# Patient Record
Sex: MALE | Race: WHITE | Employment: OTHER | URBAN - METROPOLITAN AREA
[De-identification: names, ages, dates, MRNs, and addresses within clinical notes are randomized per-mention and may not be internally consistent; named-entity substitution may affect disease eponyms.]

---

## 2017-04-04 ENCOUNTER — OFFICE VISIT (OUTPATIENT)
Dept: FAMILY MEDICINE CLINIC | Age: 67
End: 2017-04-04

## 2017-04-04 VITALS
SYSTOLIC BLOOD PRESSURE: 120 MMHG | TEMPERATURE: 97.7 F | DIASTOLIC BLOOD PRESSURE: 80 MMHG | WEIGHT: 255 LBS | HEIGHT: 74 IN | HEART RATE: 63 BPM | OXYGEN SATURATION: 98 % | BODY MASS INDEX: 32.73 KG/M2 | RESPIRATION RATE: 16 BRPM

## 2017-04-04 DIAGNOSIS — E11.40 TYPE 2 DIABETES, CONTROLLED, WITH NEUROPATHY (HCC): ICD-10-CM

## 2017-04-04 DIAGNOSIS — I10 ESSENTIAL HYPERTENSION: ICD-10-CM

## 2017-04-04 DIAGNOSIS — D69.6 THROMBOCYTOPENIA (HCC): ICD-10-CM

## 2017-04-04 DIAGNOSIS — E78.2 MIXED HYPERLIPIDEMIA: ICD-10-CM

## 2017-04-04 DIAGNOSIS — R73.9 HYPERGLYCEMIA: Primary | ICD-10-CM

## 2017-04-04 LAB — HBA1C MFR BLD: 8 %

## 2017-04-04 PROCEDURE — 1036F TOBACCO NON-USER: CPT | Performed by: FAMILY MEDICINE

## 2017-04-04 PROCEDURE — 3017F COLORECTAL CA SCREEN DOC REV: CPT | Performed by: FAMILY MEDICINE

## 2017-04-04 PROCEDURE — 4040F PNEUMOC VAC/ADMIN/RCVD: CPT | Performed by: FAMILY MEDICINE

## 2017-04-04 PROCEDURE — 1123F ACP DISCUSS/DSCN MKR DOCD: CPT | Performed by: FAMILY MEDICINE

## 2017-04-04 PROCEDURE — G8417 CALC BMI ABV UP PARAM F/U: HCPCS | Performed by: FAMILY MEDICINE

## 2017-04-04 PROCEDURE — 83036 HEMOGLOBIN GLYCOSYLATED A1C: CPT | Performed by: FAMILY MEDICINE

## 2017-04-04 PROCEDURE — 3045F PR MOST RECENT HEMOGLOBIN A1C LEVEL 7.0-9.0%: CPT | Performed by: FAMILY MEDICINE

## 2017-04-04 PROCEDURE — G8427 DOCREV CUR MEDS BY ELIG CLIN: HCPCS | Performed by: FAMILY MEDICINE

## 2017-04-04 PROCEDURE — 99214 OFFICE O/P EST MOD 30 MIN: CPT | Performed by: FAMILY MEDICINE

## 2017-04-04 RX ORDER — GLIPIZIDE 5 MG/1
5 TABLET ORAL 3 TIMES DAILY
Qty: 270 TABLET | Refills: 1 | Status: SHIPPED | OUTPATIENT
Start: 2017-04-04 | End: 2017-09-06 | Stop reason: SDUPTHER

## 2017-04-04 RX ORDER — LOSARTAN POTASSIUM AND HYDROCHLOROTHIAZIDE 25; 100 MG/1; MG/1
1 TABLET ORAL DAILY
Qty: 90 TABLET | Refills: 1 | Status: SHIPPED | OUTPATIENT
Start: 2017-04-04 | End: 2017-09-06 | Stop reason: SDUPTHER

## 2017-04-04 RX ORDER — METOPROLOL TARTRATE 100 MG/1
100 TABLET ORAL 2 TIMES DAILY
Qty: 180 TABLET | Refills: 1 | Status: SHIPPED | OUTPATIENT
Start: 2017-04-04 | End: 2017-09-06 | Stop reason: SDUPTHER

## 2017-04-04 ASSESSMENT — ENCOUNTER SYMPTOMS
RESPIRATORY NEGATIVE: 1
EYES NEGATIVE: 1
GASTROINTESTINAL NEGATIVE: 1

## 2017-04-05 LAB
CREATININE URINE: 24.3 MG/DL (ref 39–259)
MICROALBUMIN UR-MCNC: <1.2 MG/DL
MICROALBUMIN/CREAT UR-RTO: ABNORMAL MG/G (ref 0–30)

## 2017-04-12 ENCOUNTER — TELEPHONE (OUTPATIENT)
Dept: FAMILY MEDICINE CLINIC | Age: 67
End: 2017-04-12

## 2017-09-06 ENCOUNTER — OFFICE VISIT (OUTPATIENT)
Dept: FAMILY MEDICINE CLINIC | Age: 67
End: 2017-09-06

## 2017-09-06 VITALS
HEART RATE: 71 BPM | BODY MASS INDEX: 33.98 KG/M2 | TEMPERATURE: 98.1 F | OXYGEN SATURATION: 99 % | RESPIRATION RATE: 16 BRPM | SYSTOLIC BLOOD PRESSURE: 122 MMHG | DIASTOLIC BLOOD PRESSURE: 78 MMHG | HEIGHT: 74 IN | WEIGHT: 264.8 LBS

## 2017-09-06 DIAGNOSIS — E11.40 TYPE 2 DIABETES, CONTROLLED, WITH NEUROPATHY (HCC): Primary | ICD-10-CM

## 2017-09-06 DIAGNOSIS — I10 ESSENTIAL HYPERTENSION: ICD-10-CM

## 2017-09-06 DIAGNOSIS — N52.9 ERECTILE DYSFUNCTION, UNSPECIFIED ERECTILE DYSFUNCTION TYPE: ICD-10-CM

## 2017-09-06 DIAGNOSIS — M62.838 MUSCLE SPASM: ICD-10-CM

## 2017-09-06 LAB — HBA1C MFR BLD: 6.6 %

## 2017-09-06 PROCEDURE — G8427 DOCREV CUR MEDS BY ELIG CLIN: HCPCS | Performed by: FAMILY MEDICINE

## 2017-09-06 PROCEDURE — 99214 OFFICE O/P EST MOD 30 MIN: CPT | Performed by: FAMILY MEDICINE

## 2017-09-06 PROCEDURE — G8417 CALC BMI ABV UP PARAM F/U: HCPCS | Performed by: FAMILY MEDICINE

## 2017-09-06 PROCEDURE — 3017F COLORECTAL CA SCREEN DOC REV: CPT | Performed by: FAMILY MEDICINE

## 2017-09-06 PROCEDURE — 1036F TOBACCO NON-USER: CPT | Performed by: FAMILY MEDICINE

## 2017-09-06 PROCEDURE — 4040F PNEUMOC VAC/ADMIN/RCVD: CPT | Performed by: FAMILY MEDICINE

## 2017-09-06 PROCEDURE — 3046F HEMOGLOBIN A1C LEVEL >9.0%: CPT | Performed by: FAMILY MEDICINE

## 2017-09-06 PROCEDURE — 1123F ACP DISCUSS/DSCN MKR DOCD: CPT | Performed by: FAMILY MEDICINE

## 2017-09-06 PROCEDURE — 83036 HEMOGLOBIN GLYCOSYLATED A1C: CPT | Performed by: FAMILY MEDICINE

## 2017-09-06 RX ORDER — METOPROLOL TARTRATE 100 MG/1
100 TABLET ORAL 2 TIMES DAILY
Qty: 180 TABLET | Refills: 1 | Status: ON HOLD | OUTPATIENT
Start: 2017-09-06 | End: 2018-12-06 | Stop reason: HOSPADM

## 2017-09-06 RX ORDER — GLIPIZIDE 5 MG/1
5 TABLET ORAL 3 TIMES DAILY
Qty: 270 TABLET | Refills: 1 | Status: SHIPPED | OUTPATIENT
Start: 2017-09-06 | End: 2018-12-13

## 2017-09-06 RX ORDER — TIZANIDINE 4 MG/1
4 TABLET ORAL EVERY 6 HOURS PRN
Qty: 30 TABLET | Refills: 3 | Status: SHIPPED | OUTPATIENT
Start: 2017-09-06 | End: 2018-12-13 | Stop reason: ALTCHOICE

## 2017-09-06 RX ORDER — LOSARTAN POTASSIUM AND HYDROCHLOROTHIAZIDE 25; 100 MG/1; MG/1
1 TABLET ORAL DAILY
Qty: 90 TABLET | Refills: 1 | Status: SHIPPED | OUTPATIENT
Start: 2017-09-06 | End: 2022-02-18 | Stop reason: ALTCHOICE

## 2017-09-06 RX ORDER — SILDENAFIL CITRATE 20 MG/1
20 TABLET ORAL PRN
Qty: 30 TABLET | Refills: 3 | Status: ON HOLD | OUTPATIENT
Start: 2017-09-06 | End: 2018-12-06 | Stop reason: HOSPADM

## 2017-09-06 RX ORDER — CYCLOBENZAPRINE HCL 10 MG
10 TABLET ORAL 3 TIMES DAILY PRN
COMMUNITY
End: 2018-11-28

## 2018-11-23 ENCOUNTER — HOSPITAL ENCOUNTER (OUTPATIENT)
Age: 68
Discharge: HOME | End: 2018-11-23
Attending: INTERNAL MEDICINE | Admitting: INTERNAL MEDICINE
Payer: MEDICARE

## 2018-11-23 VITALS
SYSTOLIC BLOOD PRESSURE: 152 MMHG | TEMPERATURE: 97.7 F | RESPIRATION RATE: 18 BRPM | OXYGEN SATURATION: 100 % | HEART RATE: 82 BPM | DIASTOLIC BLOOD PRESSURE: 75 MMHG

## 2018-11-23 PROBLEM — D50.9 ANEMIA, IRON DEFICIENCY: Status: ACTIVE | Noted: 2018-11-23

## 2018-11-23 PROBLEM — D50.9 IRON DEFICIENCY ANEMIA: Status: ACTIVE | Noted: 2018-11-23

## 2018-11-23 LAB
ABO/RH: NORMAL
ANTIBODY SCREEN: NORMAL
BLOOD BANK DISPENSE STATUS: NORMAL
BLOOD BANK DISPENSE STATUS: NORMAL
BLOOD BANK PRODUCT CODE: NORMAL
BLOOD BANK PRODUCT CODE: NORMAL
BPU ID: NORMAL
BPU ID: NORMAL
DESCRIPTION BLOOD BANK: NORMAL
DESCRIPTION BLOOD BANK: NORMAL

## 2018-11-23 PROCEDURE — 86850 RBC ANTIBODY SCREEN: CPT

## 2018-11-23 PROCEDURE — 86923 COMPATIBILITY TEST ELECTRIC: CPT

## 2018-11-23 PROCEDURE — 94664 DEMO&/EVAL PT USE INHALER: CPT

## 2018-11-23 PROCEDURE — 2580000003 HC RX 258

## 2018-11-23 PROCEDURE — 86901 BLOOD TYPING SEROLOGIC RH(D): CPT

## 2018-11-23 PROCEDURE — 93005 ELECTROCARDIOGRAM TRACING: CPT | Performed by: INTERNAL MEDICINE

## 2018-11-23 PROCEDURE — 86900 BLOOD TYPING SEROLOGIC ABO: CPT

## 2018-11-23 PROCEDURE — 36430 TRANSFUSION BLD/BLD COMPNT: CPT

## 2018-11-23 PROCEDURE — 36415 COLL VENOUS BLD VENIPUNCTURE: CPT

## 2018-11-23 PROCEDURE — P9016 RBC LEUKOCYTES REDUCED: HCPCS

## 2018-11-23 RX ORDER — SODIUM CHLORIDE 0.9 % (FLUSH) 0.9 %
10 SYRINGE (ML) INJECTION PRN
Status: DISCONTINUED | OUTPATIENT
Start: 2018-11-23 | End: 2018-11-23 | Stop reason: HOSPADM

## 2018-11-23 RX ORDER — SODIUM CHLORIDE 9 MG/ML
INJECTION, SOLUTION INTRAVENOUS
Status: COMPLETED
Start: 2018-11-23 | End: 2018-11-23

## 2018-11-23 RX ORDER — SODIUM CHLORIDE 9 MG/ML
INJECTION, SOLUTION INTRAVENOUS
Status: DISCONTINUED
Start: 2018-11-23 | End: 2018-11-23 | Stop reason: HOSPADM

## 2018-11-23 RX ORDER — ACETAMINOPHEN 325 MG/1
650 TABLET ORAL EVERY 4 HOURS PRN
Status: DISCONTINUED | OUTPATIENT
Start: 2018-11-23 | End: 2018-11-23 | Stop reason: HOSPADM

## 2018-11-23 RX ORDER — ONDANSETRON 2 MG/ML
4 INJECTION INTRAMUSCULAR; INTRAVENOUS EVERY 6 HOURS PRN
Status: DISCONTINUED | OUTPATIENT
Start: 2018-11-23 | End: 2018-11-23 | Stop reason: HOSPADM

## 2018-11-23 RX ADMIN — SODIUM CHLORIDE 250 ML: 9 INJECTION, SOLUTION INTRAVENOUS at 14:43

## 2018-11-24 PROCEDURE — 93010 ELECTROCARDIOGRAM REPORT: CPT | Performed by: INTERNAL MEDICINE

## 2018-11-26 ENCOUNTER — OFFICE VISIT (OUTPATIENT)
Dept: CARDIOLOGY CLINIC | Age: 68
End: 2018-11-26
Payer: MEDICARE

## 2018-11-26 VITALS
DIASTOLIC BLOOD PRESSURE: 62 MMHG | HEART RATE: 83 BPM | OXYGEN SATURATION: 99 % | BODY MASS INDEX: 33.75 KG/M2 | HEIGHT: 74 IN | SYSTOLIC BLOOD PRESSURE: 138 MMHG | WEIGHT: 263 LBS

## 2018-11-26 DIAGNOSIS — R07.9 EXERTIONAL CHEST PAIN: Primary | ICD-10-CM

## 2018-11-26 DIAGNOSIS — I10 ESSENTIAL HYPERTENSION: ICD-10-CM

## 2018-11-26 LAB
BASOPHILS ABSOLUTE: 0 K/UL (ref 0–0.2)
BASOPHILS RELATIVE PERCENT: 0.4 %
EKG ATRIAL RATE: 75 BPM
EKG DIAGNOSIS: NORMAL
EKG P AXIS: 57 DEGREES
EKG P-R INTERVAL: 138 MS
EKG Q-T INTERVAL: 408 MS
EKG QRS DURATION: 92 MS
EKG QTC CALCULATION (BAZETT): 455 MS
EKG R AXIS: 33 DEGREES
EKG T AXIS: -12 DEGREES
EKG VENTRICULAR RATE: 75 BPM
EOSINOPHILS ABSOLUTE: 0.3 K/UL (ref 0–0.6)
EOSINOPHILS RELATIVE PERCENT: 5.7 %
HCT VFR BLD CALC: 27.6 % (ref 36–48)
HEMOGLOBIN: 9.1 G/DL (ref 12–16)
LYMPHOCYTES ABSOLUTE: 0.8 K/UL (ref 1–5.1)
LYMPHOCYTES RELATIVE PERCENT: 17.8 %
MCH RBC QN AUTO: 27 PG (ref 26–34)
MCHC RBC AUTO-ENTMCNC: 32.8 G/DL (ref 31–36)
MCV RBC AUTO: 82 FL (ref 80–100)
MONOCYTES ABSOLUTE: 0.4 K/UL (ref 0–1.3)
MONOCYTES RELATIVE PERCENT: 8.5 %
NEUTROPHILS ABSOLUTE: 3.2 K/UL (ref 1.7–7.7)
NEUTROPHILS RELATIVE PERCENT: 67.6 %
PDW BLD-RTO: 16.2 % (ref 12.4–15.4)
PLATELET # BLD: 124 K/UL (ref 135–450)
PMV BLD AUTO: 8.8 FL (ref 5–10.5)
RBC # BLD: 3.36 M/UL (ref 4–5.2)
WBC # BLD: 4.7 K/UL (ref 4–11)

## 2018-11-26 PROCEDURE — 1123F ACP DISCUSS/DSCN MKR DOCD: CPT | Performed by: INTERNAL MEDICINE

## 2018-11-26 PROCEDURE — G8417 CALC BMI ABV UP PARAM F/U: HCPCS | Performed by: INTERNAL MEDICINE

## 2018-11-26 PROCEDURE — 1036F TOBACCO NON-USER: CPT | Performed by: INTERNAL MEDICINE

## 2018-11-26 PROCEDURE — 4040F PNEUMOC VAC/ADMIN/RCVD: CPT | Performed by: INTERNAL MEDICINE

## 2018-11-26 PROCEDURE — G8484 FLU IMMUNIZE NO ADMIN: HCPCS | Performed by: INTERNAL MEDICINE

## 2018-11-26 PROCEDURE — G8427 DOCREV CUR MEDS BY ELIG CLIN: HCPCS | Performed by: INTERNAL MEDICINE

## 2018-11-26 PROCEDURE — 99205 OFFICE O/P NEW HI 60 MIN: CPT | Performed by: INTERNAL MEDICINE

## 2018-11-26 PROCEDURE — 1101F PT FALLS ASSESS-DOCD LE1/YR: CPT | Performed by: INTERNAL MEDICINE

## 2018-11-26 PROCEDURE — 3017F COLORECTAL CA SCREEN DOC REV: CPT | Performed by: INTERNAL MEDICINE

## 2018-11-26 NOTE — PATIENT INSTRUCTIONS
adult-strength or 2 to 4 low-dose aspirin. Wait for an ambulance. Do not try to drive yourself.    Call your doctor today if:    · You have any trouble breathing.     · Your chest pain gets worse.     · You are dizzy or lightheaded, or you feel like you may faint.     · You are not getting better as expected.     · You are having new or different chest pain. Where can you learn more? Go to [email or web address] and sign in to your Wave[employer] account. Enter A120 in the Honestly.com box to learn more about \"Chest Pain: Care Instructions. \"     If you do not have an account, please click on the \"Sign Up Now\" link. Current as of: November 20, 2017  Content Version: 11.8  © 5961-2138 Healthwise, Incorporated. Care instructions adapted under license by [facility] ([facility]). If you have questions about a medical condition or this instruction, always ask your healthcare professional. [place] [address] any warranty or liability for your use of this information.

## 2018-11-30 ENCOUNTER — APPOINTMENT (OUTPATIENT)
Dept: GENERAL RADIOLOGY | Age: 68
DRG: 247 | End: 2018-11-30
Payer: MEDICARE

## 2018-11-30 ENCOUNTER — HOSPITAL ENCOUNTER (OUTPATIENT)
Dept: NON INVASIVE DIAGNOSTICS | Age: 68
Discharge: HOME | DRG: 247 | End: 2018-11-30
Payer: MEDICARE

## 2018-11-30 ENCOUNTER — HOSPITAL ENCOUNTER (INPATIENT)
Age: 68
LOS: 5 days | Discharge: HOME | DRG: 247 | End: 2018-12-06
Attending: EMERGENCY MEDICINE | Admitting: INTERNAL MEDICINE
Payer: MEDICARE

## 2018-11-30 DIAGNOSIS — R07.9 EXERTIONAL CHEST PAIN: ICD-10-CM

## 2018-11-30 DIAGNOSIS — I21.4 NON-STEMI (NON-ST ELEVATED MYOCARDIAL INFARCTION) (HCC): Primary | ICD-10-CM

## 2018-11-30 DIAGNOSIS — D64.9 ANEMIA, UNSPECIFIED TYPE: ICD-10-CM

## 2018-11-30 DIAGNOSIS — R07.9 CHEST PAIN, UNSPECIFIED TYPE: ICD-10-CM

## 2018-11-30 DIAGNOSIS — E11.40 TYPE 2 DIABETES, CONTROLLED, WITH NEUROPATHY (HCC): ICD-10-CM

## 2018-11-30 DIAGNOSIS — D69.3 CHRONIC ITP (IDIOPATHIC THROMBOCYTOPENIA) (HCC): ICD-10-CM

## 2018-11-30 LAB
A/G RATIO: 1.2 (ref 1.1–2.2)
ALBUMIN SERPL-MCNC: 4 G/DL (ref 3.4–5)
ALP BLD-CCNC: 94 U/L (ref 40–129)
ALT SERPL-CCNC: 29 U/L (ref 10–40)
ANION GAP SERPL CALCULATED.3IONS-SCNC: 9 MMOL/L (ref 3–16)
AST SERPL-CCNC: 28 U/L (ref 15–37)
BASOPHILS ABSOLUTE: 0 K/UL (ref 0–0.2)
BASOPHILS RELATIVE PERCENT: 0.5 %
BILIRUB SERPL-MCNC: 0.6 MG/DL (ref 0–1)
BUN BLDV-MCNC: 11 MG/DL (ref 7–20)
CALCIUM SERPL-MCNC: 9.5 MG/DL (ref 8.3–10.6)
CHLORIDE BLD-SCNC: 103 MMOL/L (ref 99–110)
CO2: 27 MMOL/L (ref 21–32)
CREAT SERPL-MCNC: 0.9 MG/DL (ref 0.8–1.3)
EOSINOPHILS ABSOLUTE: 0.2 K/UL (ref 0–0.6)
EOSINOPHILS RELATIVE PERCENT: 4.2 %
GFR AFRICAN AMERICAN: >60
GFR NON-AFRICAN AMERICAN: >60
GLOBULIN: 3.4 G/DL
GLUCOSE BLD-MCNC: 126 MG/DL (ref 70–99)
GLUCOSE BLD-MCNC: 202 MG/DL (ref 70–99)
GLUCOSE BLD-MCNC: 205 MG/DL (ref 70–99)
HCT VFR BLD CALC: 27.4 % (ref 40.5–52.5)
HEMOGLOBIN: 8.9 G/DL (ref 13.5–17.5)
INR BLD: 1.14 (ref 0.86–1.14)
LV EF: 50 %
LVEF MODALITY: NORMAL
LYMPHOCYTES ABSOLUTE: 0.7 K/UL (ref 1–5.1)
LYMPHOCYTES RELATIVE PERCENT: 16.7 %
MAGNESIUM: 1.9 MG/DL (ref 1.8–2.4)
MCH RBC QN AUTO: 26.3 PG (ref 26–34)
MCHC RBC AUTO-ENTMCNC: 32.5 G/DL (ref 31–36)
MCV RBC AUTO: 80.8 FL (ref 80–100)
MONOCYTES ABSOLUTE: 0.3 K/UL (ref 0–1.3)
MONOCYTES RELATIVE PERCENT: 8.3 %
NEUTROPHILS ABSOLUTE: 2.9 K/UL (ref 1.7–7.7)
NEUTROPHILS RELATIVE PERCENT: 70.3 %
PDW BLD-RTO: 16.2 % (ref 12.4–15.4)
PERFORMED ON: ABNORMAL
PERFORMED ON: ABNORMAL
PLATELET # BLD: 108 K/UL (ref 135–450)
PMV BLD AUTO: 7.9 FL (ref 5–10.5)
POTASSIUM REFLEX MAGNESIUM: 3.4 MMOL/L (ref 3.5–5.1)
PRO-BNP: 335 PG/ML (ref 0–124)
PROTHROMBIN TIME: 13 SEC (ref 9.8–13)
RBC # BLD: 3.39 M/UL (ref 4.2–5.9)
SODIUM BLD-SCNC: 139 MMOL/L (ref 136–145)
TOTAL PROTEIN: 7.4 G/DL (ref 6.4–8.2)
TROPONIN: 0.02 NG/ML
TROPONIN: 0.03 NG/ML
WBC # BLD: 4.1 K/UL (ref 4–11)

## 2018-11-30 PROCEDURE — 85025 COMPLETE CBC W/AUTO DIFF WBC: CPT

## 2018-11-30 PROCEDURE — 83880 ASSAY OF NATRIURETIC PEPTIDE: CPT

## 2018-11-30 PROCEDURE — 2580000003 HC RX 258: Performed by: INTERNAL MEDICINE

## 2018-11-30 PROCEDURE — 83735 ASSAY OF MAGNESIUM: CPT

## 2018-11-30 PROCEDURE — C9113 INJ PANTOPRAZOLE SODIUM, VIA: HCPCS | Performed by: INTERNAL MEDICINE

## 2018-11-30 PROCEDURE — G0378 HOSPITAL OBSERVATION PER HR: HCPCS

## 2018-11-30 PROCEDURE — 96374 THER/PROPH/DIAG INJ IV PUSH: CPT

## 2018-11-30 PROCEDURE — 80053 COMPREHEN METABOLIC PANEL: CPT

## 2018-11-30 PROCEDURE — 6370000000 HC RX 637 (ALT 250 FOR IP): Performed by: INTERNAL MEDICINE

## 2018-11-30 PROCEDURE — 6360000002 HC RX W HCPCS: Performed by: INTERNAL MEDICINE

## 2018-11-30 PROCEDURE — 36415 COLL VENOUS BLD VENIPUNCTURE: CPT

## 2018-11-30 PROCEDURE — A9502 TC99M TETROFOSMIN: HCPCS | Performed by: INTERNAL MEDICINE

## 2018-11-30 PROCEDURE — 85610 PROTHROMBIN TIME: CPT

## 2018-11-30 PROCEDURE — 6370000000 HC RX 637 (ALT 250 FOR IP): Performed by: EMERGENCY MEDICINE

## 2018-11-30 PROCEDURE — 78452 HT MUSCLE IMAGE SPECT MULT: CPT

## 2018-11-30 PROCEDURE — 84484 ASSAY OF TROPONIN QUANT: CPT

## 2018-11-30 PROCEDURE — 3430000000 HC RX DIAGNOSTIC RADIOPHARMACEUTICAL: Performed by: INTERNAL MEDICINE

## 2018-11-30 PROCEDURE — 71046 X-RAY EXAM CHEST 2 VIEWS: CPT

## 2018-11-30 PROCEDURE — 99285 EMERGENCY DEPT VISIT HI MDM: CPT

## 2018-11-30 PROCEDURE — 93005 ELECTROCARDIOGRAM TRACING: CPT | Performed by: PHYSICIAN ASSISTANT

## 2018-11-30 PROCEDURE — 93017 CV STRESS TEST TRACING ONLY: CPT | Performed by: INTERNAL MEDICINE

## 2018-11-30 RX ORDER — HYDROCHLOROTHIAZIDE 25 MG/1
25 TABLET ORAL DAILY
Status: DISCONTINUED | OUTPATIENT
Start: 2018-12-01 | End: 2018-12-01

## 2018-11-30 RX ORDER — LOSARTAN POTASSIUM 100 MG/1
100 TABLET ORAL DAILY
Status: DISCONTINUED | OUTPATIENT
Start: 2018-12-01 | End: 2018-12-01

## 2018-11-30 RX ORDER — DEXTROSE MONOHYDRATE 50 MG/ML
100 INJECTION, SOLUTION INTRAVENOUS PRN
Status: DISCONTINUED | OUTPATIENT
Start: 2018-11-30 | End: 2018-12-03

## 2018-11-30 RX ORDER — ONDANSETRON 2 MG/ML
4 INJECTION INTRAMUSCULAR; INTRAVENOUS EVERY 6 HOURS PRN
Status: DISCONTINUED | OUTPATIENT
Start: 2018-11-30 | End: 2018-12-06 | Stop reason: HOSPADM

## 2018-11-30 RX ORDER — ASPIRIN 81 MG/1
324 TABLET, CHEWABLE ORAL ONCE
Status: COMPLETED | OUTPATIENT
Start: 2018-11-30 | End: 2018-11-30

## 2018-11-30 RX ORDER — MAGNESIUM SULFATE 1 G/100ML
1 INJECTION INTRAVENOUS PRN
Status: DISCONTINUED | OUTPATIENT
Start: 2018-11-30 | End: 2018-12-06 | Stop reason: HOSPADM

## 2018-11-30 RX ORDER — ASPIRIN 81 MG/1
81 TABLET, CHEWABLE ORAL DAILY
Status: DISCONTINUED | OUTPATIENT
Start: 2018-12-01 | End: 2018-12-06 | Stop reason: HOSPADM

## 2018-11-30 RX ORDER — DEXTROSE MONOHYDRATE 25 G/50ML
12.5 INJECTION, SOLUTION INTRAVENOUS PRN
Status: DISCONTINUED | OUTPATIENT
Start: 2018-11-30 | End: 2018-12-03

## 2018-11-30 RX ORDER — TIZANIDINE 4 MG/1
4 TABLET ORAL EVERY 8 HOURS PRN
Status: DISCONTINUED | OUTPATIENT
Start: 2018-11-30 | End: 2018-12-06 | Stop reason: HOSPADM

## 2018-11-30 RX ORDER — SODIUM CHLORIDE 0.9 % (FLUSH) 0.9 %
10 SYRINGE (ML) INJECTION EVERY 12 HOURS SCHEDULED
Status: DISCONTINUED | OUTPATIENT
Start: 2018-11-30 | End: 2018-12-04

## 2018-11-30 RX ORDER — NITROGLYCERIN 0.4 MG/1
0.4 TABLET SUBLINGUAL EVERY 5 MIN PRN
Status: DISCONTINUED | OUTPATIENT
Start: 2018-11-30 | End: 2018-12-06 | Stop reason: HOSPADM

## 2018-11-30 RX ORDER — SODIUM CHLORIDE 9 MG/ML
INJECTION, SOLUTION INTRAVENOUS CONTINUOUS
Status: DISPENSED | OUTPATIENT
Start: 2018-11-30 | End: 2018-12-01

## 2018-11-30 RX ORDER — POTASSIUM CHLORIDE 7.45 MG/ML
10 INJECTION INTRAVENOUS PRN
Status: DISCONTINUED | OUTPATIENT
Start: 2018-11-30 | End: 2018-12-06 | Stop reason: HOSPADM

## 2018-11-30 RX ORDER — METOPROLOL TARTRATE 50 MG/1
100 TABLET, FILM COATED ORAL 2 TIMES DAILY
Status: DISCONTINUED | OUTPATIENT
Start: 2018-11-30 | End: 2018-12-06 | Stop reason: HOSPADM

## 2018-11-30 RX ORDER — POTASSIUM CHLORIDE 20 MEQ/1
40 TABLET, EXTENDED RELEASE ORAL PRN
Status: DISCONTINUED | OUTPATIENT
Start: 2018-11-30 | End: 2018-12-06 | Stop reason: HOSPADM

## 2018-11-30 RX ORDER — FERROUS SULFATE 325(65) MG
325 TABLET ORAL 2 TIMES DAILY
COMMUNITY

## 2018-11-30 RX ORDER — ATORVASTATIN CALCIUM 40 MG/1
40 TABLET, FILM COATED ORAL NIGHTLY
Status: DISCONTINUED | OUTPATIENT
Start: 2018-11-30 | End: 2018-12-06 | Stop reason: HOSPADM

## 2018-11-30 RX ORDER — PANTOPRAZOLE SODIUM 40 MG/10ML
40 INJECTION, POWDER, LYOPHILIZED, FOR SOLUTION INTRAVENOUS 2 TIMES DAILY
Status: DISCONTINUED | OUTPATIENT
Start: 2018-11-30 | End: 2018-12-06 | Stop reason: HOSPADM

## 2018-11-30 RX ORDER — LOSARTAN POTASSIUM AND HYDROCHLOROTHIAZIDE 25; 100 MG/1; MG/1
1 TABLET ORAL DAILY
Status: DISCONTINUED | OUTPATIENT
Start: 2018-12-01 | End: 2018-11-30 | Stop reason: CLARIF

## 2018-11-30 RX ORDER — SODIUM CHLORIDE 9 MG/ML
INJECTION, SOLUTION INTRAVENOUS CONTINUOUS
Status: DISCONTINUED | OUTPATIENT
Start: 2018-11-30 | End: 2018-11-30

## 2018-11-30 RX ORDER — NICOTINE POLACRILEX 4 MG
15 LOZENGE BUCCAL PRN
Status: DISCONTINUED | OUTPATIENT
Start: 2018-11-30 | End: 2018-12-06 | Stop reason: HOSPADM

## 2018-11-30 RX ORDER — SODIUM CHLORIDE 0.9 % (FLUSH) 0.9 %
10 SYRINGE (ML) INJECTION PRN
Status: DISCONTINUED | OUTPATIENT
Start: 2018-11-30 | End: 2018-12-06 | Stop reason: HOSPADM

## 2018-11-30 RX ADMIN — SODIUM CHLORIDE: 9 INJECTION, SOLUTION INTRAVENOUS at 21:55

## 2018-11-30 RX ADMIN — Medication 10 ML: at 21:56

## 2018-11-30 RX ADMIN — TETROFOSMIN 30 MILLICURIE: 0.23 INJECTION, POWDER, LYOPHILIZED, FOR SOLUTION INTRAVENOUS at 14:40

## 2018-11-30 RX ADMIN — TETROFOSMIN 10 MILLICURIE: 0.23 INJECTION, POWDER, LYOPHILIZED, FOR SOLUTION INTRAVENOUS at 12:55

## 2018-11-30 RX ADMIN — METOPROLOL TARTRATE 100 MG: 50 TABLET, FILM COATED ORAL at 21:55

## 2018-11-30 RX ADMIN — ASPIRIN 81 MG 324 MG: 81 TABLET ORAL at 17:01

## 2018-11-30 RX ADMIN — PANTOPRAZOLE SODIUM 40 MG: 40 INJECTION, POWDER, FOR SOLUTION INTRAVENOUS at 21:55

## 2018-11-30 ASSESSMENT — PAIN SCALES - GENERAL: PAINLEVEL_OUTOF10: 0

## 2018-11-30 NOTE — H&P
HOSPITALISTS HISTORY AND PHYSICAL    11/30/2018 6:00 PM    Patient Information:  Rahul Dos Santos is a 77 y.o. male [id number]  PCP:  Yelena Quezada (Tel: [phone] )    Chief complaint:    Chief Complaint   Patient presents with    Other     Patient sent in from cardiology with complaints of an abnormal stress test and anemia         History of Present Illness:  Ariel Schuster is a 77 y.o. male who Was sent to the emergency department for a positive stress test that was done as an outpatient at our hospital.  The patient and the wife at the bedside both were describing the situation. Per the wife the patient's problem started several weeks ago as lightheadedness and shortness of breath with activity. She said that he was found to be anemic with hemoglobin that was low, and his primary care provider did send him to follow with the GI doctor. The patient symptoms could worsen over 7 days and his hemoglobin dropped from 9-7 and he was sent to receive blood transfusion when he felt that he has chest pain and shoulder pain have the GI doctor did consult cardiologist to clear him for a colposcopic procedure. Cardiologist didn't order a stress test during which was abnormal, I looked for the stress test in the Epic/chart. Following results, further ED department physician there was some ST depression. REVIEW OF SYSTEMS:   Constitutional: Negative for fever,chills or night sweats  ENT: Negative for rhinorrhea, epistaxis, hoarseness, sore throat. Respiratory: Negative for shortness of breath,wheezing  Cardiovascular: Negative for chest pain, palpitations   Gastrointestinal: Negative for nausea, vomiting, diarrhea  Genitourinary: Negative for polyuria, dysuria   Hematologic/Lymphatic: Negative for bleeding tendency, easy bruising  Musculoskeletal: Negative for myalgias and arthralgias  Neurologic: Negative for confusion,dysarthria.   Skin: Negative for itching,rash  Psychiatric: Negative for depression,anxiety, agitation. Endocrine: Negative for polydipsia,polyuria,heat /cold intolerance. Past Medical History:   has a past medical history of Cyst of joint of hand; Hypertension; and Type II or unspecified type diabetes mellitus without mention of complication, not stated as uncontrolled. Past Surgical History:   has a past surgical history that includes cyst removal; Inguinal hernia repair (Left, 12/11/15); and back surgery. Medications:  No current facility-administered medications on file prior to encounter. Current Outpatient Prescriptions on File Prior to Encounter   Medication Sig Dispense Refill    glipiZIDE (GLUCOTROL) 5 MG tablet Take 1 tablet by mouth 3 times daily (Patient taking differently: Take 10 mg by mouth 2 times daily (before meals) ) 270 tablet 1    losartan-hydrochlorothiazide (HYZAAR) 100-25 MG per tablet Take 1 tablet by mouth daily 90 tablet 1    metFORMIN (GLUCOPHAGE) 1000 MG tablet Take 1 tablet by mouth 2 times daily (with meals) 180 tablet 1    metoprolol (LOPRESSOR) 100 MG tablet Take 1 tablet by mouth 2 times daily 180 tablet 1    sildenafil (REVATIO) 20 MG tablet Take 1 tablet by mouth as needed (as needed) 30 tablet 3    tiZANidine (ZANAFLEX) 4 MG tablet Take 1 tablet by mouth every 6 hours as needed (as needed) 30 tablet 3    aspirin 81 MG tablet Take 81 mg by mouth daily      Multiple Vitamins-Minerals (MULTIVITAMIN PO) Take 1 tablet by mouth daily      CINNAMON PO Take 1,000 mg by mouth 2 times daily      glucose blood VI test strips (FREESTYLE LITE) strip Test blood sugar once daily. 100 each 3       Allergies: Allergies   Allergen Reactions    No Known Allergies         Social History:   reports that he has never smoked. He has never used smokeless tobacco. He reports that he drinks alcohol. He reports that he does not use drugs.      Family History:  family history includes Arthritis in his brother; Diabetes in his father; Heart Disease in his father; Kidney Disease in his mother. , Patient denies any smoking/alcohol/illicit drugs    Physical Exam:  BP (!) 132/54   Pulse 94   Temp 99.1 °F (37.3 °C) (Oral)   Resp 24   Ht 6' 2\" (1.88 m)   Wt 250 lb (113.4 kg)   SpO2 100%   BMI 32.10 kg/m²     General appearance:  Appears comfortable. Well nourished  Eyes: Sclera clear, pupils equal  ENT: Moist mucus membranes, no thrush. Trachea midline. Cardiovascular: Regular rhythm, normal S1, S2. No murmur, gallop, rub. No edema in lower extremities  Respiratory: Clear to auscultation bilaterally, no wheeze, good inspiratory effort  Gastrointestinal: Abdomen soft, non-tender, not distended, normal bowel sounds  Musculoskeletal: No cyanosis in digits, neck supple  Neurology: Cranial nerves grossly intact. Alert and oriented in time, place and person. No speech or motor deficits  Psychiatry: Appropriate affect. Not agitated  Skin: Warm, dry, normal turgor, no rash    Labs:  CBC:   Lab Results   Component Value Date    WBC 4.1 11/30/2018    RBC 3.39 11/30/2018    RBC 4.03 11/23/2016    HGB 8.9 11/30/2018    HCT 27.4 11/30/2018    MCV 80.8 11/30/2018    MCH 26.3 11/30/2018    MCHC 32.5 11/30/2018    RDW 16.2 11/30/2018     11/30/2018    MPV 7.9 11/30/2018     BMP:    Lab Results   Component Value Date     11/30/2018    K 3.4 11/30/2018     11/30/2018    CO2 27 11/30/2018    BUN 11 11/30/2018    CREATININE 0.9 11/30/2018    CALCIUM 9.5 11/30/2018    GFRAA >60 11/30/2018    GFRAA >60 03/08/2013    LABGLOM >60 11/30/2018    GLUCOSE 202 11/30/2018       Chest Xray:   EKG:    I visualized CXR images and EKG strips     Discussed care with ED provider     Problem List  Active Problems:    * No active hospital problems. *  Resolved Problems:    * No resolved hospital problems.  *        Assessment/Plan:   Abnormal stress test, patient denies any chest pain at the time of the interview, patient denies

## 2018-11-30 NOTE — ED PROVIDER NOTES
education: 12     Social History Main Topics    Smoking status: Never Smoker    Smokeless tobacco: Never Used    Alcohol use 0.0 oz/week      Comment: rare    Drug use: No    Sexual activity: Yes     Partners: Female     Other Topics Concern    None     Social History Narrative    None       Medications/Allergies     Previous Medications    ASPIRIN 81 MG TABLET    Take 81 mg by mouth daily    CINNAMON PO    Take 1,000 mg by mouth 2 times daily    GLIPIZIDE (GLUCOTROL) 5 MG TABLET    Take 1 tablet by mouth 3 times daily    GLUCOSE BLOOD VI TEST STRIPS (FREESTYLE LITE) STRIP    Test blood sugar once daily.     LOSARTAN-HYDROCHLOROTHIAZIDE (HYZAAR) 100-25 MG PER TABLET    Take 1 tablet by mouth daily    METFORMIN (GLUCOPHAGE) 1000 MG TABLET    Take 1 tablet by mouth 2 times daily (with meals)    METOPROLOL (LOPRESSOR) 100 MG TABLET    Take 1 tablet by mouth 2 times daily    MULTIPLE VITAMINS-MINERALS (MULTIVITAMIN PO)    Take 1 tablet by mouth daily    SILDENAFIL (REVATIO) 20 MG TABLET    Take 1 tablet by mouth as needed (as needed)    TIZANIDINE (ZANAFLEX) 4 MG TABLET    Take 1 tablet by mouth every 6 hours as needed (as needed)     Allergies   Allergen Reactions    No Known Allergies         Physical Exam       ED Triage Vitals [11/30/18 1611]   BP Temp Temp Source Pulse Resp SpO2 Height Weight   (!) 173/69 99.1 °F (37.3 °C) Oral 104 16 100 % 6' 2\" (1.88 m) 250 lb (113.4 kg)     General: Well-appearing well-nourished male in no acute distress  HEENT:  PERRLA, oropharynx moist mucous membranes no erythema edema or tonsillar exudate no trismus uvula is midline neck is supple   Chest: Regular rate and rhythm no murmurs rubs or gallops  Lungs: clear to auscultation bilaterally no wheezes rales rhonchi or stridor no accessory muscle usage no pursed lip breathing   Abdomen: Soft nontender nondistended no rebound guarding or peritoneal signs no tenderness at McBurney's point negative Eastman sign  Back: No cervical Range    Pro- (H) 0 - 124 pg/mL   Protime-INR   Result Value Ref Range    Protime 13.0 9.8 - 13.0 sec    INR 1.14 0.86 - 1.14   Magnesium   Result Value Ref Range    Magnesium 1.90 1.80 - 2.40 mg/dL     Radiographs:  Xr Chest Standard (2 Vw)    Result Date: 11/30/2018  EXAMINATION: TWO VIEWS OF THE CHEST, 11/30/2018 4:54 pm COMPARISON: None HISTORY: ORDERING SYSTEM PROVIDED HISTORY: CP TECHNOLOGIST PROVIDED HISTORY: Reason for exam:->CP Ordering Physician Provided Reason for Exam: CP Acuity: Acute Type of Exam: Initial FINDINGS: The cardiomediastinal silhouette is normal.  No acute airspace disease. No pleural effusion. No pulmonary edema. Mild elevation of the right hemidiaphragm. Calcifications seen along the surface of the right hemidiaphragm. Evidence for old granulomatous disease. 1. No acute abnormality seen in the chest. 2. Calcification along the surface of the right hemidiaphragm most likely from an old inflammatory process or trauma. Stress/rest Nm Myocardial Spect - Do Not Uncheck    Result Date: 11/30/2018  Cardiac Perfusion Imaging  Demographics   Patient Name       Sergio Mccall   Date of Study      11/30/2018         Gender              Male   Patient Number     [id number]         Date of Birth       1950   Visit Number       [id number]          Age                 77 year(s)   Accession Number   [id number]          Room Number   Corporate ID       [id number]           NM Technician       Leeanna Jennings, GILA   Interpreting        Shawn Villalta                                        Physician           MD, Nilson Lang MD   The procedure was explained in detail to the patient. Risks,  complications and alternative treatments were reviewed. Written consent  was obtained.   Procedure Procedure Type:   Nuclear Stress

## 2018-11-30 NOTE — ED PROVIDER NOTES
Physician Assistant-In-Triage Note:    Pt Name: Fernie Jackson  MRN: [id number]  [place]: 1950  Date of evaluation: 11/30/2018      Chief Complaint:    Chief Complaint   Patient presents with    Other     Patient sent in from cardiology with complaints of an abnormal stress test and anemia        I have performed a medical screening examination and evaluated this patient briefly in triage with the purpose of initiating their emergency department workup in an expeditious manner. In brief, Fernie Jcakson is a 77 y.o. male who presents to the emergency department with reports of being sent down from the stress lab by Dr. Nelly Ochoa after an abnormal stress test that occurred earlier today. Patient reports he's been having long-standing difficulties with anemia and is supposed to have an upcoming EGD and colonoscopy because of this anemia. Patient states with this he has had difficulties with intermittent left-sided chest pain as well as shortness of breath with associated radiation of the symptoms in his neck and shoulder. It is reported that he was seen and evaluated by his primary care physician and arrangements were made for him to have a stress test.  It is reported that the patient had that performed today when it was noted to be abnormal and they suggested he come to the emergency department only for evaluation and treatment but for admission and ongoing care and management. It is reported that he had pain and discomfort while doing a stress test approximately 2 hours ago but is currently pain-free.   Patient has no additional complaints voiced the present    ED Triage Vitals   BP Temp Temp src Pulse Resp SpO2 Height Weight   -- -- -- -- -- -- -- --     Vitals:    11/30/18 1611   BP: (!) 173/69   Pulse: 104   Resp: 16   Temp: 99.1 °F (37.3 °C)   SpO2: 100%       Focused physical examination of this well-appearing and non-toxic patient is notable for no evidence of acute distress, non-labored

## 2018-12-01 LAB
ABO/RH: NORMAL
ANTIBODY SCREEN: NORMAL
BILIRUB SERPL-MCNC: 0.7 MG/DL (ref 0–1)
BILIRUBIN DIRECT: <0.2 MG/DL (ref 0–0.3)
BILIRUBIN, INDIRECT: NORMAL MG/DL (ref 0–1)
BLOOD BANK DISPENSE STATUS: NORMAL
BLOOD BANK PRODUCT CODE: NORMAL
BPU ID: NORMAL
CHOLESTEROL, TOTAL: 135 MG/DL (ref 0–199)
DAT POLYSPECIFIC: NORMAL
DESCRIPTION BLOOD BANK: NORMAL
FIBRINOGEN: 231 MG/DL (ref 200–397)
GLUCOSE BLD-MCNC: 187 MG/DL (ref 70–99)
GLUCOSE BLD-MCNC: 187 MG/DL (ref 70–99)
GLUCOSE BLD-MCNC: 222 MG/DL (ref 70–99)
GLUCOSE BLD-MCNC: 388 MG/DL (ref 70–99)
HCT VFR BLD CALC: 24 % (ref 40.5–52.5)
HCT VFR BLD CALC: 27.2 % (ref 40.5–52.5)
HCT VFR BLD CALC: 28.3 % (ref 40.5–52.5)
HCT VFR BLD CALC: 29 % (ref 40.5–52.5)
HDLC SERPL-MCNC: 30 MG/DL (ref 40–60)
HEMOGLOBIN: 7.8 G/DL (ref 13.5–17.5)
HEMOGLOBIN: 8.9 G/DL (ref 13.5–17.5)
HEMOGLOBIN: 9.1 G/DL (ref 13.5–17.5)
HEMOGLOBIN: 9.3 G/DL (ref 13.5–17.5)
LACTATE DEHYDROGENASE: 222 U/L (ref 100–190)
LDL CHOLESTEROL CALCULATED: 85 MG/DL
MCH RBC QN AUTO: 26.5 PG (ref 26–34)
MCHC RBC AUTO-ENTMCNC: 32.7 G/DL (ref 31–36)
MCV RBC AUTO: 81 FL (ref 80–100)
PDW BLD-RTO: 16.3 % (ref 12.4–15.4)
PERFORMED ON: ABNORMAL
PLATELET # BLD: 98 K/UL (ref 135–450)
PMV BLD AUTO: 7.8 FL (ref 5–10.5)
RBC # BLD: 3.35 M/UL (ref 4.2–5.9)
TRIGL SERPL-MCNC: 99 MG/DL (ref 0–150)
TROPONIN: 0.02 NG/ML
TROPONIN: 0.02 NG/ML
VLDLC SERPL CALC-MCNC: 20 MG/DL
WBC # BLD: 3.1 K/UL (ref 4–11)

## 2018-12-01 PROCEDURE — 36415 COLL VENOUS BLD VENIPUNCTURE: CPT

## 2018-12-01 PROCEDURE — 6370000000 HC RX 637 (ALT 250 FOR IP): Performed by: INTERNAL MEDICINE

## 2018-12-01 PROCEDURE — C9113 INJ PANTOPRAZOLE SODIUM, VIA: HCPCS | Performed by: INTERNAL MEDICINE

## 2018-12-01 PROCEDURE — 93010 ELECTROCARDIOGRAM REPORT: CPT | Performed by: INTERNAL MEDICINE

## 2018-12-01 PROCEDURE — 2580000003 HC RX 258: Performed by: NURSE PRACTITIONER

## 2018-12-01 PROCEDURE — 96376 TX/PRO/DX INJ SAME DRUG ADON: CPT

## 2018-12-01 PROCEDURE — 2580000003 HC RX 258: Performed by: INTERNAL MEDICINE

## 2018-12-01 PROCEDURE — G0378 HOSPITAL OBSERVATION PER HR: HCPCS

## 2018-12-01 PROCEDURE — 6360000002 HC RX W HCPCS: Performed by: INTERNAL MEDICINE

## 2018-12-01 PROCEDURE — 86923 COMPATIBILITY TEST ELECTRIC: CPT

## 2018-12-01 PROCEDURE — 86900 BLOOD TYPING SEROLOGIC ABO: CPT

## 2018-12-01 PROCEDURE — 82247 BILIRUBIN TOTAL: CPT

## 2018-12-01 PROCEDURE — 1200000000 HC SEMI PRIVATE

## 2018-12-01 PROCEDURE — 85027 COMPLETE CBC AUTOMATED: CPT

## 2018-12-01 PROCEDURE — 86880 COOMBS TEST DIRECT: CPT

## 2018-12-01 PROCEDURE — 90686 IIV4 VACC NO PRSV 0.5 ML IM: CPT | Performed by: INTERNAL MEDICINE

## 2018-12-01 PROCEDURE — 36430 TRANSFUSION BLD/BLD COMPNT: CPT

## 2018-12-01 PROCEDURE — 82248 BILIRUBIN DIRECT: CPT

## 2018-12-01 PROCEDURE — G0008 ADMIN INFLUENZA VIRUS VAC: HCPCS | Performed by: INTERNAL MEDICINE

## 2018-12-01 PROCEDURE — 99223 1ST HOSP IP/OBS HIGH 75: CPT | Performed by: INTERNAL MEDICINE

## 2018-12-01 PROCEDURE — 6370000000 HC RX 637 (ALT 250 FOR IP): Performed by: NURSE PRACTITIONER

## 2018-12-01 PROCEDURE — 83615 LACTATE (LD) (LDH) ENZYME: CPT

## 2018-12-01 PROCEDURE — P9016 RBC LEUKOCYTES REDUCED: HCPCS

## 2018-12-01 PROCEDURE — 85384 FIBRINOGEN ACTIVITY: CPT

## 2018-12-01 PROCEDURE — 86850 RBC ANTIBODY SCREEN: CPT

## 2018-12-01 PROCEDURE — 86901 BLOOD TYPING SEROLOGIC RH(D): CPT

## 2018-12-01 PROCEDURE — 84484 ASSAY OF TROPONIN QUANT: CPT

## 2018-12-01 PROCEDURE — 85014 HEMATOCRIT: CPT

## 2018-12-01 PROCEDURE — 83010 ASSAY OF HAPTOGLOBIN QUANT: CPT

## 2018-12-01 PROCEDURE — 80061 LIPID PANEL: CPT

## 2018-12-01 PROCEDURE — 85018 HEMOGLOBIN: CPT

## 2018-12-01 RX ORDER — FERROUS SULFATE 325(65) MG
325 TABLET ORAL
Status: DISCONTINUED | OUTPATIENT
Start: 2018-12-01 | End: 2018-12-04

## 2018-12-01 RX ORDER — 0.9 % SODIUM CHLORIDE 0.9 %
250 INTRAVENOUS SOLUTION INTRAVENOUS ONCE
Status: COMPLETED | OUTPATIENT
Start: 2018-12-01 | End: 2018-12-01

## 2018-12-01 RX ORDER — LOSARTAN POTASSIUM 100 MG/1
100 TABLET ORAL DAILY
Status: DISCONTINUED | OUTPATIENT
Start: 2018-12-02 | End: 2018-12-06 | Stop reason: HOSPADM

## 2018-12-01 RX ORDER — HYDROCHLOROTHIAZIDE 25 MG/1
25 TABLET ORAL DAILY
Status: DISCONTINUED | OUTPATIENT
Start: 2018-12-02 | End: 2018-12-06 | Stop reason: HOSPADM

## 2018-12-01 RX ADMIN — FERROUS SULFATE TAB 325 MG (65 MG ELEMENTAL FE) 325 MG: 325 (65 FE) TAB at 08:42

## 2018-12-01 RX ADMIN — PANTOPRAZOLE SODIUM 40 MG: 40 INJECTION, POWDER, FOR SOLUTION INTRAVENOUS at 20:07

## 2018-12-01 RX ADMIN — POTASSIUM CHLORIDE 40 MEQ: 1500 TABLET, EXTENDED RELEASE ORAL at 10:51

## 2018-12-01 RX ADMIN — INSULIN LISPRO 5 UNITS: 100 INJECTION, SOLUTION INTRAVENOUS; SUBCUTANEOUS at 13:00

## 2018-12-01 RX ADMIN — INSULIN LISPRO 2 UNITS: 100 INJECTION, SOLUTION INTRAVENOUS; SUBCUTANEOUS at 17:43

## 2018-12-01 RX ADMIN — METOPROLOL TARTRATE 100 MG: 50 TABLET, FILM COATED ORAL at 20:06

## 2018-12-01 RX ADMIN — METOPROLOL TARTRATE 100 MG: 50 TABLET, FILM COATED ORAL at 08:42

## 2018-12-01 RX ADMIN — INSULIN LISPRO 2 UNITS: 100 INJECTION, SOLUTION INTRAVENOUS; SUBCUTANEOUS at 00:30

## 2018-12-01 RX ADMIN — SODIUM CHLORIDE 250 ML: 9 INJECTION, SOLUTION INTRAVENOUS at 02:41

## 2018-12-01 RX ADMIN — INFLUENZA A VIRUS A/MICHIGAN/45/2015 X-275 (H1N1) ANTIGEN (FORMALDEHYDE INACTIVATED), INFLUENZA A VIRUS A/SINGAPORE/INFIMH-16-0019/2016 IVR-186 (H3N2) ANTIGEN (FORMALDEHYDE INACTIVATED), INFLUENZA B VIRUS B/PHUKET/3073/2013 ANTIGEN (FORMALDEHYDE INACTIVATED), AND INFLUENZA B VIRUS B/MARYLAND/15/2016 BX-69A ANTIGEN (FORMALDEHYDE INACTIVATED) 0.5 ML: 15; 15; 15; 15 INJECTION, SUSPENSION INTRAMUSCULAR at 12:54

## 2018-12-01 RX ADMIN — ASPIRIN 81 MG CHEWABLE TABLET 81 MG: 81 TABLET CHEWABLE at 08:42

## 2018-12-01 RX ADMIN — INSULIN LISPRO 1 UNITS: 100 INJECTION, SOLUTION INTRAVENOUS; SUBCUTANEOUS at 05:15

## 2018-12-01 RX ADMIN — Medication 10 ML: at 08:43

## 2018-12-01 RX ADMIN — PANTOPRAZOLE SODIUM 40 MG: 40 INJECTION, POWDER, FOR SOLUTION INTRAVENOUS at 08:42

## 2018-12-01 RX ADMIN — Medication 10 ML: at 20:07

## 2018-12-01 RX ADMIN — Medication 10 ML: at 20:11

## 2018-12-01 ASSESSMENT — PAIN SCALES - GENERAL: PAINLEVEL_OUTOF10: 0

## 2018-12-01 NOTE — PROGRESS NOTES
12/01/18 0509   Gross per 24 hour   Intake           885.17 ml   Output                0 ml   Net           885.17 ml    Wt Readings from Last 3 Encounters:   12/01/18 255 lb 14.4 oz (116.1 kg)   11/26/18 263 lb (119.3 kg)   09/06/17 264 lb 12.8 oz (120.1 kg)       General appearance:  Appears comfortable  Eyes: Sclera clear. Pupils equal.  ENT: Moist oral mucosa. Trachea midline, no adenopathy. Cardiovascular: Regular rhythm, normal S1, S2. No murmur. No edema in lower extremities  Respiratory: Not using accessory muscles. Good inspiratory effort. Clear to auscultation bilaterally, no wheeze or crackles. GI: Abdomen soft, no tenderness, not distended, normal bowel sounds  Musculoskeletal: No cyanosis in digits, neck supple  Neurology: CN 2-12 grossly intact. No speech or motor deficits  Psych: Normal affect. Alert and oriented in time, place and person  Skin: Warm, dry, normal turgor    Labs and Tests:  CBC:   Recent Labs      11/30/18   1619  12/01/18   0012  12/01/18   0609   WBC  4.1   --   3.1*   HGB  8.9*  7.8*  8.9*   PLT  108*   --   98*     BMP:  Recent Labs      11/30/18   1619   NA  139   K  3.4*   CL  103   CO2  27   BUN  11   CREATININE  0.9   GLUCOSE  202*     Hepatic: Recent Labs      11/30/18   1619   AST  28   ALT  29   BILITOT  0.6   ALKPHOS  94       Problem List  Active Problems:    Chest pain  Resolved Problems:    * No resolved hospital problems.  *       Assessment & Plan:   Abnormal stress test, patient denies any chest pain at the time of the interview, patient denies any shortness of breath while resting, patient troponin was minimally elevated 0.02, patient EKG was reviewed,?  T wave changes in the lateral leads.  -continue on telemetry  - troponins are stable  - Aspirin, he received 360 mg, continue 81 mg unless GI would like to hold it  -Nitroglycerin + morphine prn chest pain ( patient states that he has never taken sildenafil in the last couple of months)  -Stat consult for cardiologist, the patient doesn't have a chest pain at the present time, his troponin is minimal, no significant T-wave changes, high risk for bleeding from the GI, I'm not comfortable to start him on anticoagulation at the present time, I discussed this issue with the patient and wife and they both agree not to start anticoagulation. Appreciate cardiology input in this regard    Acute anemia, concerns for GI bleed, the patient was followed by GI as an outpatient, Dr. Tai Emerson,   - pt has pancytopenia, will consult Hematology for opinion  - s/p one unit pRBC transfusion  - H&H every 6 hours, transfusion threshold is below 8  - Keep the patient nothing by mouth until seen and evaluated by the GI, on saline IV fluid for maintenance. - Protonix 40 mg IV twice a day  - Consult GI.     HTN continue with home medications     DM Will hold patient metformin/glipizide, we'll place him on a sliding scale insulin, hypoglycemia orders and carb controlled diet     Tizanidine prn for muscle spasm      Diet: Diet NPO Effective Now Exceptions are: Sips with Meds  Code:Full Code  DVT PPX scd    Addendum discussed with GI and cardiology, no plan for intervention over the weekend, ok to feed him, possible EGD on Monday. Continue to monitor his HH.        Jennifer Vincent MD   12/1/2018 7:06 AM

## 2018-12-02 LAB
ANION GAP SERPL CALCULATED.3IONS-SCNC: 11 MMOL/L (ref 3–16)
BUN BLDV-MCNC: 10 MG/DL (ref 7–20)
CALCIUM SERPL-MCNC: 9 MG/DL (ref 8.3–10.6)
CHLORIDE BLD-SCNC: 102 MMOL/L (ref 99–110)
CO2: 26 MMOL/L (ref 21–32)
CREAT SERPL-MCNC: 1.1 MG/DL (ref 0.8–1.3)
FERRITIN: 18 NG/ML (ref 30–400)
FOLATE: >20 NG/ML (ref 4.78–24.2)
GFR AFRICAN AMERICAN: >60
GFR NON-AFRICAN AMERICAN: >60
GLUCOSE BLD-MCNC: 217 MG/DL (ref 70–99)
GLUCOSE BLD-MCNC: 229 MG/DL (ref 70–99)
GLUCOSE BLD-MCNC: 241 MG/DL (ref 70–99)
GLUCOSE BLD-MCNC: 314 MG/DL (ref 70–99)
GLUCOSE BLD-MCNC: 329 MG/DL (ref 70–99)
HAPTOGLOBIN: 97 MG/DL (ref 30–200)
HCT VFR BLD CALC: 27.4 % (ref 40.5–52.5)
HCT VFR BLD CALC: 28.3 % (ref 40.5–52.5)
HCT VFR BLD CALC: 28.3 % (ref 40.5–52.5)
HCT VFR BLD CALC: 28.5 % (ref 40.5–52.5)
HCT VFR BLD CALC: 29 % (ref 40.5–52.5)
HEMOGLOBIN: 8.9 G/DL (ref 13.5–17.5)
HEMOGLOBIN: 9 G/DL (ref 13.5–17.5)
HEMOGLOBIN: 9.2 G/DL (ref 13.5–17.5)
HEMOGLOBIN: 9.3 G/DL (ref 13.5–17.5)
HEMOGLOBIN: 9.3 G/DL (ref 13.5–17.5)
IRON SATURATION: 10 % (ref 20–50)
IRON: 36 UG/DL (ref 59–158)
MCH RBC QN AUTO: 26.7 PG (ref 26–34)
MCHC RBC AUTO-ENTMCNC: 32.5 G/DL (ref 31–36)
MCV RBC AUTO: 82.1 FL (ref 80–100)
PDW BLD-RTO: 16.1 % (ref 12.4–15.4)
PERFORMED ON: ABNORMAL
PLATELET # BLD: 101 K/UL (ref 135–450)
PMV BLD AUTO: 8.8 FL (ref 5–10.5)
POTASSIUM SERPL-SCNC: 4.2 MMOL/L (ref 3.5–5.1)
RBC # BLD: 3.47 M/UL (ref 4.2–5.9)
SODIUM BLD-SCNC: 139 MMOL/L (ref 136–145)
TOTAL IRON BINDING CAPACITY: 369 UG/DL (ref 260–445)
VITAMIN B-12: 338 PG/ML (ref 211–911)
WBC # BLD: 4.2 K/UL (ref 4–11)

## 2018-12-02 PROCEDURE — C9113 INJ PANTOPRAZOLE SODIUM, VIA: HCPCS | Performed by: INTERNAL MEDICINE

## 2018-12-02 PROCEDURE — 82746 ASSAY OF FOLIC ACID SERUM: CPT

## 2018-12-02 PROCEDURE — 6370000000 HC RX 637 (ALT 250 FOR IP): Performed by: INTERNAL MEDICINE

## 2018-12-02 PROCEDURE — 83540 ASSAY OF IRON: CPT

## 2018-12-02 PROCEDURE — 85018 HEMOGLOBIN: CPT

## 2018-12-02 PROCEDURE — 2580000003 HC RX 258: Performed by: INTERNAL MEDICINE

## 2018-12-02 PROCEDURE — 1200000000 HC SEMI PRIVATE

## 2018-12-02 PROCEDURE — 82728 ASSAY OF FERRITIN: CPT

## 2018-12-02 PROCEDURE — 6370000000 HC RX 637 (ALT 250 FOR IP): Performed by: NURSE PRACTITIONER

## 2018-12-02 PROCEDURE — 36415 COLL VENOUS BLD VENIPUNCTURE: CPT

## 2018-12-02 PROCEDURE — 80048 BASIC METABOLIC PNL TOTAL CA: CPT

## 2018-12-02 PROCEDURE — 85027 COMPLETE CBC AUTOMATED: CPT

## 2018-12-02 PROCEDURE — 6360000002 HC RX W HCPCS: Performed by: INTERNAL MEDICINE

## 2018-12-02 PROCEDURE — 82607 VITAMIN B-12: CPT

## 2018-12-02 PROCEDURE — 83550 IRON BINDING TEST: CPT

## 2018-12-02 PROCEDURE — 85014 HEMATOCRIT: CPT

## 2018-12-02 PROCEDURE — 99232 SBSQ HOSP IP/OBS MODERATE 35: CPT | Performed by: NURSE PRACTITIONER

## 2018-12-02 RX ORDER — ACETAMINOPHEN 80 MG
TABLET,CHEWABLE ORAL
Status: COMPLETED
Start: 2018-12-02 | End: 2018-12-02

## 2018-12-02 RX ORDER — LANOLIN ALCOHOL/MO/W.PET/CERES
250 CREAM (GRAM) TOPICAL DAILY
Status: DISCONTINUED | OUTPATIENT
Start: 2018-12-02 | End: 2018-12-06 | Stop reason: HOSPADM

## 2018-12-02 RX ADMIN — DESMOPRESSIN ACETATE 40 MG: 0.2 TABLET ORAL at 20:42

## 2018-12-02 RX ADMIN — FERROUS SULFATE TAB 325 MG (65 MG ELEMENTAL FE) 325 MG: 325 (65 FE) TAB at 09:14

## 2018-12-02 RX ADMIN — METOPROLOL TARTRATE 100 MG: 50 TABLET, FILM COATED ORAL at 09:14

## 2018-12-02 RX ADMIN — HYDROCHLOROTHIAZIDE 25 MG: 25 TABLET ORAL at 09:14

## 2018-12-02 RX ADMIN — METOPROLOL TARTRATE 100 MG: 50 TABLET, FILM COATED ORAL at 20:42

## 2018-12-02 RX ADMIN — IRON SUCROSE 200 MG: 20 INJECTION, SOLUTION INTRAVENOUS at 17:34

## 2018-12-02 RX ADMIN — Medication: at 19:28

## 2018-12-02 RX ADMIN — INSULIN LISPRO 4 UNITS: 100 INJECTION, SOLUTION INTRAVENOUS; SUBCUTANEOUS at 17:34

## 2018-12-02 RX ADMIN — PANTOPRAZOLE SODIUM 40 MG: 40 INJECTION, POWDER, FOR SOLUTION INTRAVENOUS at 20:40

## 2018-12-02 RX ADMIN — Medication 10 ML: at 20:43

## 2018-12-02 RX ADMIN — CYANOCOBALAMIN TAB 1000 MCG 250 MCG: 1000 TAB at 17:41

## 2018-12-02 RX ADMIN — PANTOPRAZOLE SODIUM 40 MG: 40 INJECTION, POWDER, FOR SOLUTION INTRAVENOUS at 09:14

## 2018-12-02 RX ADMIN — Medication 10 ML: at 09:14

## 2018-12-02 RX ADMIN — INSULIN LISPRO 8 UNITS: 100 INJECTION, SOLUTION INTRAVENOUS; SUBCUTANEOUS at 13:43

## 2018-12-02 RX ADMIN — ASPIRIN 81 MG CHEWABLE TABLET 81 MG: 81 TABLET CHEWABLE at 09:14

## 2018-12-02 RX ADMIN — INSULIN LISPRO 4 UNITS: 100 INJECTION, SOLUTION INTRAVENOUS; SUBCUTANEOUS at 09:13

## 2018-12-02 RX ADMIN — LOSARTAN POTASSIUM 100 MG: 100 TABLET, FILM COATED ORAL at 09:14

## 2018-12-02 ASSESSMENT — PAIN SCALES - GENERAL
PAINLEVEL_OUTOF10: 0
PAINLEVEL_OUTOF10: 0

## 2018-12-02 NOTE — PROGRESS NOTES
[facility] PROGRESS NOTE    12/2/2018 7:50 AM        Name: Leda Taveras . Admitted: 11/30/2018  Primary Care Provider: Daily Bhandari (Tel: [phone])      Subjective: Michael Marie   Pt is lying in bed  Wife is at the bed-side  He denies any CP or dizziness  Reviewed interval ancillary notes    Current Medications    ferrous sulfate tablet 325 mg Daily with breakfast   losartan (COZAAR) tablet 100 mg Daily   And    hydrochlorothiazide (HYDRODIURIL) tablet 25 mg Daily   insulin lispro (HUMALOG) injection pen 0-6 Units TID WC   metoprolol tartrate (LOPRESSOR) tablet 100 mg BID   tiZANidine (ZANAFLEX) tablet 4 mg Q8H PRN   glucose (GLUTOSE) 40 % oral gel 15 g PRN   dextrose 50 % solution 12.5 g PRN   glucagon (rDNA) injection 1 mg PRN   dextrose 5 % solution PRN   sodium chloride flush 0.9 % injection 10 mL 2 times per day   sodium chloride flush 0.9 % injection 10 mL PRN   magnesium hydroxide (MILK OF MAGNESIA) 400 MG/5ML suspension 30 mL Daily PRN   ondansetron (ZOFRAN) injection 4 mg Q6H PRN   atorvastatin (LIPITOR) tablet 40 mg Nightly   aspirin chewable tablet 81 mg Daily   magnesium sulfate 1 g in dextrose 5% 100 mL IVPB PRN   potassium chloride (KLOR-CON M) extended release tablet 40 mEq PRN   Or    potassium bicarb-citric acid (EFFER-K) effervescent tablet 40 mEq PRN   Or    potassium chloride 10 mEq/100 mL IVPB (Peripheral Line) PRN   nitroGLYCERIN (NITROSTAT) SL tablet 0.4 mg Q5 Min PRN   pantoprazole (PROTONIX) injection 40 mg BID       Objective:  /69   Pulse 84   Temp 99 °F (37.2 °C) (Temporal)   Resp 16   Ht 6' 2\" (1.88 m)   Wt 260 lb 1.6 oz (118 kg)   SpO2 95%   BMI 33.39 kg/m²     Intake/Output Summary (Last 24 hours) at 12/02/18 0750  Last data filed at 12/01/18 2005   Gross per 24 hour   Intake              700 ml   Output                0 ml   Net              700 ml           General appearance:  Appears comfortable  Eyes: Sclera clear. Pupils equal.  ENT: Moist oral mucosa. Trachea midline, no adenopathy. Cardiovascular: Regular rhythm, normal S1, S2. No murmur. No edema in lower extremities  Respiratory: Not using accessory muscles. Good inspiratory effort. Clear to auscultation bilaterally, no wheeze or crackles. GI: Abdomen soft, no tenderness, not distended, normal bowel sounds  Musculoskeletal: No cyanosis in digits, neck supple  Neurology: CN 2-12 grossly intact. No speech or motor deficits  Psych: Normal affect. Alert and oriented in time, place and person  Skin: Warm, dry, normal turgor    Labs and Tests:  CBC:   Recent Labs      11/30/18   1619   12/01/18   0609   12/01/18   1821  12/02/18   0005  12/02/18   0608   WBC  4.1   --   3.1*   --    --    --    --    HGB  8.9*   < >  8.9*   < >  9.1*  8.9*  9.0*   PLT  108*   --   98*   --    --    --    --     < > = values in this interval not displayed. BMP:    Recent Labs      11/30/18   1619   NA  139   K  3.4*   CL  103   CO2  27   BUN  11   CREATININE  0.9   GLUCOSE  202*     Hepatic:   Recent Labs      11/30/18   1619  12/01/18   1339   AST  28   --    ALT  29   --    BILITOT  0.6  0.7   ALKPHOS  94   --        Problem List  Active Problems:    Chest pain  Resolved Problems:    * No resolved hospital problems.  *       Assessment & Plan:   Abnormal stress test, patient denies any chest pain at the time of the interview, patient denies any shortness of breath while resting, patient troponin was minimally elevated 0.02, patient EKG was reviewed,  -continue to watch on telemetry  - troponins are stable  - Aspirin, he received 360 mg, continue 81 mg unless GI would like to hold it  -Nitroglycerin + morphine prn chest pain ( patient states that he has never taken sildenafil in the last couple of months)  -cardiology saw the pt and they are considering cardiac cath after EGD on Monday    Acute anemia, concerns for GI bleed, the patient was followed by GI as an outpatient, Dr. Matty Cota, his [place] is stable  - pt has pancytopenia, will consult Hematology for opinion  - s/p one unit pRBC transfusion  - H&H every 12 hours, transfusion threshold is below 8  - Keep the patient nothing by mouth until seen and evaluated by the GI, on saline IV fluid for maintenance.   - Protonix 40 mg IV twice a day  - Consult GI.     HTN continue with home medications     DM Will hold patient metformin/glipizide, we'll place him on a sliding scale insulin, hypoglycemia orders and carb controlled diet     Tizanidine prn for muscle spasm      Diet: DIET CARDIAC; Carb Control: 4 carb choices (60 gms)/meal; No Caffeine  Diet NPO Time Specified Exceptions are: Sips with Meds  Code:Full Code  DVT PPX scd    Bailee Pena MD   12/2/2018 7:50 AM

## 2018-12-02 NOTE — CONSULTS
[facility]  Cardiac Consult     Referring Provider:  Gregg Newell         History of Present Illness:  76 y/o male admitted with anemia and unstable angina. He has been fairly healthy. He was recently found to be anemic with HgB around 7. He was seen by GI and transfused to 9. He also complained of upper neck and shoulder discomfort exacerbated by activity and relieved with rest. Moderate severity without associated symptoms. He was sent to Dr. Camron Barahona for clearance for EGD and Colon. A stress myoview was scheduled which he had yesterday afternoon. He only walked 3 minutes  And developed typical angina. EKG showed progressive ST depression in recovery up to 2 mm that normalized after NTG and about 8 minutes rest. Myoview showed moderate fixed defect. The GXT was felt to be high risk and he was sent to ER and admitted. He has no obvious bleeding. He has a h/o low platelets and saw a hematologist in the past with 2016 note suggesting chronic ITP. Past Medical History:   has a past medical history of Cyst of joint of hand; Hypertension; and Type II or unspecified type diabetes mellitus without mention of complication, not stated as uncontrolled. Surgical History:   has a past surgical history that includes cyst removal; Inguinal hernia repair (Left, 12/11/15); and back surgery. Social History:   reports that he has never smoked. He has never used smokeless tobacco. He reports that he does not drink alcohol or use drugs. Family History:  family history includes Arthritis in his brother; Diabetes in his father; Heart Disease in his father; Kidney Disease in his mother.      Medications:   sodium chloride  250 mL Intravenous Once    ferrous sulfate  325 mg Oral Daily with breakfast    [START ON 12/2/2018] losartan  100 mg Oral Daily    And    [START ON 12/2/2018] hydrochlorothiazide  25 mg Oral Daily    metoprolol  100 mg Oral BID    sodium chloride flush  10 mL Intravenous 2 times per
hours.    Invalid input(s): KETONESU  Magnesium:   Lab Results   Component Value Date    MG 1.90 11/30/2018     CARL:    Lab Results   Component Value Date    CARL Neg 11/21/2016       RADIOLOGY:      --    Current Medications   insulin lispro  0-12 Units Subcutaneous TID WC    iron sucrose (VENOFER) iv piggyback 100 mL (Admin over 60 minutes)  200 mg Intravenous Q24H    ferrous sulfate  325 mg Oral Daily with breakfast    losartan  100 mg Oral Daily    And    hydrochlorothiazide  25 mg Oral Daily    metoprolol  100 mg Oral BID    sodium chloride flush  10 mL Intravenous 2 times per day    atorvastatin  40 mg Oral Nightly    aspirin  81 mg Oral Daily    pantoprazole  40 mg Intravenous BID       ASSESSMENT AND PLAN    Patient 77 Y WM,Hx of Chr. ITP PLT in 70-90's,admitted with Symptomatic anemia with with anginal pain and abnormal stress test.    Normocytic anemia  - JOSE LUIS -give IV iron  -No sign of hemolysis, normal bilirubin  -No DIC, fibrinogen normal with PT/INR normal  -AUNDREA test negative  -GI workup pending  -will check Iron panel and Vit B12/FA,retic count    Chronic thrombocytopenia  -Chr.  ITP  -PLT better in 90's    CAD w angina  -Symptomatic with anemia    Agree w GI SANDERS in setting of hx of JOSE LUIS, previous hemoglobin in 13 range  Give IV iron while here  No sign of autoimmune hemolytic anemia or hemolysis    PRN PRBC TX if HGB  < 8, areas with angina    Dr. Graham Trivedi will round in AM    DW patient and agree with Preston Sharma MD, 12/2/2018, 3:41 PM

## 2018-12-03 ENCOUNTER — ANESTHESIA EVENT (OUTPATIENT)
Dept: ENDOSCOPY | Age: 68
DRG: 247 | End: 2018-12-03
Payer: MEDICARE

## 2018-12-03 LAB
BASOPHILS ABSOLUTE: 0 K/UL (ref 0–0.2)
BASOPHILS RELATIVE PERCENT: 0.6 %
EKG ATRIAL RATE: 100 BPM
EKG DIAGNOSIS: NORMAL
EKG P AXIS: 37 DEGREES
EKG P-R INTERVAL: 136 MS
EKG Q-T INTERVAL: 370 MS
EKG QRS DURATION: 94 MS
EKG QTC CALCULATION (BAZETT): 477 MS
EKG R AXIS: 30 DEGREES
EKG T AXIS: -14 DEGREES
EKG VENTRICULAR RATE: 100 BPM
EOSINOPHILS ABSOLUTE: 0.2 K/UL (ref 0–0.6)
EOSINOPHILS RELATIVE PERCENT: 4.3 %
GLUCOSE BLD-MCNC: 217 MG/DL (ref 70–99)
GLUCOSE BLD-MCNC: 218 MG/DL (ref 70–99)
GLUCOSE BLD-MCNC: 224 MG/DL (ref 70–99)
GLUCOSE BLD-MCNC: 231 MG/DL (ref 70–99)
HCT VFR BLD CALC: 29.5 % (ref 40.5–52.5)
HCT VFR BLD CALC: 30.3 % (ref 40.5–52.5)
HEMOGLOBIN: 9.6 G/DL (ref 13.5–17.5)
HEMOGLOBIN: 9.8 G/DL (ref 13.5–17.5)
LEFT VENTRICULAR EJECTION FRACTION MODE: NORMAL
LV EF: 55 %
LYMPHOCYTES ABSOLUTE: 0.6 K/UL (ref 1–5.1)
LYMPHOCYTES RELATIVE PERCENT: 17.1 %
MCH RBC QN AUTO: 26.3 PG (ref 26–34)
MCHC RBC AUTO-ENTMCNC: 32.2 G/DL (ref 31–36)
MCV RBC AUTO: 81.5 FL (ref 80–100)
MONOCYTES ABSOLUTE: 0.3 K/UL (ref 0–1.3)
MONOCYTES RELATIVE PERCENT: 9 %
NEUTROPHILS ABSOLUTE: 2.5 K/UL (ref 1.7–7.7)
NEUTROPHILS RELATIVE PERCENT: 69 %
PDW BLD-RTO: 16.5 % (ref 12.4–15.4)
PERFORMED ON: ABNORMAL
PLATELET # BLD: 98 K/UL (ref 135–450)
PLATELET SLIDE REVIEW: ABNORMAL
PMV BLD AUTO: 8.2 FL (ref 5–10.5)
RBC # BLD: 3.72 M/UL (ref 4.2–5.9)
SLIDE REVIEW: ABNORMAL
WBC # BLD: 3.7 K/UL (ref 4–11)

## 2018-12-03 PROCEDURE — 1200000000 HC SEMI PRIVATE

## 2018-12-03 PROCEDURE — 85025 COMPLETE CBC W/AUTO DIFF WBC: CPT

## 2018-12-03 PROCEDURE — 6360000002 HC RX W HCPCS: Performed by: INTERNAL MEDICINE

## 2018-12-03 PROCEDURE — C9113 INJ PANTOPRAZOLE SODIUM, VIA: HCPCS | Performed by: INTERNAL MEDICINE

## 2018-12-03 PROCEDURE — 6360000004 HC RX CONTRAST MEDICATION: Performed by: INTERNAL MEDICINE

## 2018-12-03 PROCEDURE — 85014 HEMATOCRIT: CPT

## 2018-12-03 PROCEDURE — C1894 INTRO/SHEATH, NON-LASER: HCPCS

## 2018-12-03 PROCEDURE — 93458 L HRT ARTERY/VENTRICLE ANGIO: CPT | Performed by: INTERNAL MEDICINE

## 2018-12-03 PROCEDURE — 99153 MOD SED SAME PHYS/QHP EA: CPT | Performed by: INTERNAL MEDICINE

## 2018-12-03 PROCEDURE — 85018 HEMOGLOBIN: CPT

## 2018-12-03 PROCEDURE — B2151ZZ FLUOROSCOPY OF LEFT HEART USING LOW OSMOLAR CONTRAST: ICD-10-PCS | Performed by: INTERNAL MEDICINE

## 2018-12-03 PROCEDURE — 6370000000 HC RX 637 (ALT 250 FOR IP): Performed by: INTERNAL MEDICINE

## 2018-12-03 PROCEDURE — 6360000002 HC RX W HCPCS

## 2018-12-03 PROCEDURE — 2580000003 HC RX 258: Performed by: INTERNAL MEDICINE

## 2018-12-03 PROCEDURE — 99152 MOD SED SAME PHYS/QHP 5/>YRS: CPT | Performed by: INTERNAL MEDICINE

## 2018-12-03 PROCEDURE — C1887 CATHETER, GUIDING: HCPCS

## 2018-12-03 PROCEDURE — 2500000003 HC RX 250 WO HCPCS

## 2018-12-03 PROCEDURE — 4A023N7 MEASUREMENT OF CARDIAC SAMPLING AND PRESSURE, LEFT HEART, PERCUTANEOUS APPROACH: ICD-10-PCS | Performed by: INTERNAL MEDICINE

## 2018-12-03 PROCEDURE — C1769 GUIDE WIRE: HCPCS

## 2018-12-03 PROCEDURE — 36415 COLL VENOUS BLD VENIPUNCTURE: CPT

## 2018-12-03 PROCEDURE — B2111ZZ FLUOROSCOPY OF MULTIPLE CORONARY ARTERIES USING LOW OSMOLAR CONTRAST: ICD-10-PCS | Performed by: INTERNAL MEDICINE

## 2018-12-03 RX ORDER — PEG-3350, SODIUM SULFATE, SODIUM CHLORIDE, POTASSIUM CHLORIDE, SODIUM ASCORBATE AND ASCORBIC ACID 7.5-2.691G
100 KIT ORAL ONCE
Status: DISCONTINUED | OUTPATIENT
Start: 2018-12-04 | End: 2018-12-04

## 2018-12-03 RX ORDER — SODIUM CHLORIDE 0.9 % (FLUSH) 0.9 %
10 SYRINGE (ML) INJECTION PRN
Status: DISCONTINUED | OUTPATIENT
Start: 2018-12-03 | End: 2018-12-06 | Stop reason: HOSPADM

## 2018-12-03 RX ORDER — ACETAMINOPHEN 325 MG/1
650 TABLET ORAL EVERY 4 HOURS PRN
Status: DISCONTINUED | OUTPATIENT
Start: 2018-12-03 | End: 2018-12-06 | Stop reason: HOSPADM

## 2018-12-03 RX ORDER — SODIUM CHLORIDE 0.9 % (FLUSH) 0.9 %
10 SYRINGE (ML) INJECTION EVERY 12 HOURS SCHEDULED
Status: DISCONTINUED | OUTPATIENT
Start: 2018-12-03 | End: 2018-12-04

## 2018-12-03 RX ORDER — SODIUM CHLORIDE 9 MG/ML
INJECTION, SOLUTION INTRAVENOUS CONTINUOUS
Status: DISCONTINUED | OUTPATIENT
Start: 2018-12-03 | End: 2018-12-04

## 2018-12-03 RX ORDER — PEG-3350, SODIUM SULFATE, SODIUM CHLORIDE, POTASSIUM CHLORIDE, SODIUM ASCORBATE AND ASCORBIC ACID 7.5-2.691G
100 KIT ORAL ONCE
Status: COMPLETED | OUTPATIENT
Start: 2018-12-03 | End: 2018-12-04

## 2018-12-03 RX ADMIN — PANTOPRAZOLE SODIUM 40 MG: 40 INJECTION, POWDER, FOR SOLUTION INTRAVENOUS at 20:57

## 2018-12-03 RX ADMIN — SODIUM CHLORIDE: 9 INJECTION, SOLUTION INTRAVENOUS at 16:08

## 2018-12-03 RX ADMIN — INSULIN LISPRO 4 UNITS: 100 INJECTION, SOLUTION INTRAVENOUS; SUBCUTANEOUS at 16:45

## 2018-12-03 RX ADMIN — Medication 10 ML: at 20:57

## 2018-12-03 RX ADMIN — LOSARTAN POTASSIUM 100 MG: 100 TABLET, FILM COATED ORAL at 09:51

## 2018-12-03 RX ADMIN — INSULIN LISPRO 4 UNITS: 100 INJECTION, SOLUTION INTRAVENOUS; SUBCUTANEOUS at 13:29

## 2018-12-03 RX ADMIN — IRON SUCROSE 200 MG: 20 INJECTION, SOLUTION INTRAVENOUS at 16:42

## 2018-12-03 RX ADMIN — METOPROLOL TARTRATE 100 MG: 50 TABLET, FILM COATED ORAL at 20:57

## 2018-12-03 RX ADMIN — CYANOCOBALAMIN TAB 1000 MCG 250 MCG: 1000 TAB at 09:51

## 2018-12-03 RX ADMIN — IOPAMIDOL 108 ML: 755 INJECTION, SOLUTION INTRAVENOUS at 14:44

## 2018-12-03 RX ADMIN — DESMOPRESSIN ACETATE 40 MG: 0.2 TABLET ORAL at 20:57

## 2018-12-03 RX ADMIN — METOPROLOL TARTRATE 100 MG: 50 TABLET, FILM COATED ORAL at 09:51

## 2018-12-03 ASSESSMENT — PAIN SCALES - GENERAL
PAINLEVEL_OUTOF10: 0

## 2018-12-03 NOTE — ANESTHESIA PRE PROCEDURE
Department of Anesthesiology  Preprocedure Note       Name:  Mariusz Zimmerman   Age:  77 y.o.  :  1950                                          MRN:  [id number]         Date:  12/3/2018      Surgeon: Katelyn Lane):  Flory Joseph MD    Procedure: EGD DIAGNOSTIC ONLY (N/A Abdomen)    Medications prior to admission:   Prior to Admission medications    Medication Sig Start Date End Date Taking? Authorizing Provider   ferrous sulfate 325 (65 Fe) MG tablet Take 325 mg by mouth daily (with breakfast)   Yes Historical Provider, MD   glipiZIDE (GLUCOTROL) 5 MG tablet Take 1 tablet by mouth 3 times daily  Patient taking differently: Take 10 mg by mouth 2 times daily (before meals)  17   Mihai Oropeza MD   losartan-hydrochlorothiazide [facility]) 100-25 MG per tablet Take 1 tablet by mouth daily 17   Mihai Oropeza MD   metFORMIN (GLUCOPHAGE) 1000 MG tablet Take 1 tablet by mouth 2 times daily (with meals) 17   Mihai Oropeza MD   metoprolol (LOPRESSOR) 100 MG tablet Take 1 tablet by mouth 2 times daily 17   Mihai Oropeza MD   sildenafil (REVATIO) 20 MG tablet Take 1 tablet by mouth as needed (as needed) 17   Mihai Oropeza MD   tiZANidine (ZANAFLEX) 4 MG tablet Take 1 tablet by mouth every 6 hours as needed (as needed) 17   Mihai Oropeza MD   aspirin 81 MG tablet Take 81 mg by mouth daily    Historical Provider, MD   Multiple Vitamins-Minerals (MULTIVITAMIN PO) Take 1 tablet by mouth daily    Historical Provider, MD   CINNAMON PO Take 1,000 mg by mouth 2 times daily    Historical Provider, MD   glucose blood VI test strips (FREESTYLE LITE) strip Test blood sugar once daily.  10/6/14   Mihai Oropeza MD       Current medications:    Current Facility-Administered Medications   Medication Dose Route Frequency Provider Last Rate Last Dose    insulin lispro (HUMALOG) injection pen 0-12 Units  0-12 Units Subcutaneous TID  Clayton Avila MD   4 Units at 18 1329    iron sucrose PRN Alessandro Nelson MD   40 mEq at 12/01/18 1051    Or    potassium bicarb-citric acid (EFFER-K) effervescent tablet 40 mEq  40 mEq Oral PRN Clayton Kong MD        Or    potassium chloride 10 mEq/100 mL IVPB (Peripheral Line)  10 mEq Intravenous PRN Alessandro Nelson MD        nitroGLYCERIN (NITROSTAT) SL tablet 0.4 mg  0.4 mg Sublingual Q5 Min PRN Alessandro Nelson MD        pantoprazole (PROTONIX) injection 40 mg  40 mg Intravenous BID Alessandro Nelson MD   40 mg at 12/02/18 2040       Allergies:     Allergies   Allergen Reactions    No Known Allergies        Problem List:    Patient Active Problem List   Diagnosis Code    Hypertension I10    Cyst of joint of hand M25.849    High triglycerides E78.1    Dupuytren's contracture of right hand M72.0    Rosacea L71.9    Thrombocytopenia (HCC) D69.6    Chronic ITP (idiopathic thrombocytopenia) ([facility]) D69.3    Iron deficiency anemia D50.9    Anemia, iron deficiency D50.9    Chest pain R07.9    Non-STEMI (non-ST elevated myocardial infarction) ([facility]) I21.4       Past Medical History:        Diagnosis Date    Cyst of joint of hand     right hand ganglion cyst    Hypertension     Type II or unspecified type diabetes mellitus without mention of complication, not stated as uncontrolled        Past Surgical History:        Procedure Laterality Date    BACK SURGERY      CYST REMOVAL      INGUINAL HERNIA REPAIR Left 12/11/15    laparoscopic       Social History:    Social History   Substance Use Topics    Smoking status: Never Smoker    Smokeless tobacco: Never Used    Alcohol use No      Comment: rare                                Counseling given: Not Answered      Vital Signs (Current):   Vitals:    12/02/18 2315 12/03/18 0415 12/03/18 0930 12/03/18 1200   BP: (!) 111/50 (!) 125/54 (!) 167/80 138/71   Pulse: 75 83 82 71   Resp: 16 16 16 16   Temp: 36.8 °C (98.3 °F) 36.7 °C (98 °F) 36.9 °C (98.5 °F) 36.8 °C (98.2 °F)   TempSrc: Temporal Temporal

## 2018-12-03 NOTE — PROGRESS NOTES
25 mg daily, Lopressor 100 mg twice a day, CTM      DM  -BS stable, cont to hold metformin and glipizide, cont SSI, CTM    Chronic ITP  - mgmt per Hem/Onc        Diet: DIET CARB CONTROL; No Caffeine  Diet NPO Time Specified Exceptions are: Sips with Meds    Activity: up as tolerated  Prophylaxis: SCD    Code status: Full Code     ----------        Janette Roque MD  -------------------------------  [facility] hospitalist

## 2018-12-04 ENCOUNTER — ANESTHESIA (OUTPATIENT)
Dept: ENDOSCOPY | Age: 68
DRG: 247 | End: 2018-12-04
Payer: MEDICARE

## 2018-12-04 ENCOUNTER — APPOINTMENT (OUTPATIENT)
Dept: ULTRASOUND IMAGING | Age: 68
DRG: 247 | End: 2018-12-04
Payer: MEDICARE

## 2018-12-04 ENCOUNTER — HOSPITAL ENCOUNTER (OUTPATIENT)
Dept: OPERATING ROOM | Age: 68
Discharge: HOME | End: 2018-12-05

## 2018-12-04 VITALS — OXYGEN SATURATION: 100 % | DIASTOLIC BLOOD PRESSURE: 60 MMHG | SYSTOLIC BLOOD PRESSURE: 134 MMHG

## 2018-12-04 LAB
GLUCOSE BLD-MCNC: 136 MG/DL (ref 70–99)
GLUCOSE BLD-MCNC: 160 MG/DL (ref 70–99)
GLUCOSE BLD-MCNC: 188 MG/DL (ref 70–99)
GLUCOSE BLD-MCNC: 228 MG/DL (ref 70–99)
GLUCOSE BLD-MCNC: 280 MG/DL (ref 70–99)
HCT VFR BLD CALC: 29.3 % (ref 40.5–52.5)
HCT VFR BLD CALC: 30.7 % (ref 40.5–52.5)
HEMOGLOBIN: 9.4 G/DL (ref 13.5–17.5)
HEMOGLOBIN: 9.8 G/DL (ref 13.5–17.5)
PERFORMED ON: ABNORMAL

## 2018-12-04 PROCEDURE — 76700 US EXAM ABDOM COMPLETE: CPT

## 2018-12-04 PROCEDURE — 6370000000 HC RX 637 (ALT 250 FOR IP): Performed by: INTERNAL MEDICINE

## 2018-12-04 PROCEDURE — 2580000003 HC RX 258: Performed by: NURSE ANESTHETIST, CERTIFIED REGISTERED

## 2018-12-04 PROCEDURE — 36415 COLL VENOUS BLD VENIPUNCTURE: CPT

## 2018-12-04 PROCEDURE — 3609012400 HC EGD TRANSORAL BIOPSY SINGLE/MULTIPLE: Performed by: INTERNAL MEDICINE

## 2018-12-04 PROCEDURE — C9113 INJ PANTOPRAZOLE SODIUM, VIA: HCPCS | Performed by: INTERNAL MEDICINE

## 2018-12-04 PROCEDURE — 85014 HEMATOCRIT: CPT

## 2018-12-04 PROCEDURE — 0DBK8ZX EXCISION OF ASCENDING COLON, VIA NATURAL OR ARTIFICIAL OPENING ENDOSCOPIC, DIAGNOSTIC: ICD-10-PCS | Performed by: INTERNAL MEDICINE

## 2018-12-04 PROCEDURE — 0DB98ZX EXCISION OF DUODENUM, VIA NATURAL OR ARTIFICIAL OPENING ENDOSCOPIC, DIAGNOSTIC: ICD-10-PCS | Performed by: INTERNAL MEDICINE

## 2018-12-04 PROCEDURE — 3700000001 HC ADD 15 MINUTES (ANESTHESIA): Performed by: INTERNAL MEDICINE

## 2018-12-04 PROCEDURE — 2580000003 HC RX 258: Performed by: INTERNAL MEDICINE

## 2018-12-04 PROCEDURE — 2500000003 HC RX 250 WO HCPCS: Performed by: NURSE ANESTHETIST, CERTIFIED REGISTERED

## 2018-12-04 PROCEDURE — 99232 SBSQ HOSP IP/OBS MODERATE 35: CPT | Performed by: NURSE PRACTITIONER

## 2018-12-04 PROCEDURE — 1200000000 HC SEMI PRIVATE

## 2018-12-04 PROCEDURE — 6370000000 HC RX 637 (ALT 250 FOR IP): Performed by: NURSE PRACTITIONER

## 2018-12-04 PROCEDURE — 6360000002 HC RX W HCPCS: Performed by: INTERNAL MEDICINE

## 2018-12-04 PROCEDURE — 88305 TISSUE EXAM BY PATHOLOGIST: CPT

## 2018-12-04 PROCEDURE — 6360000002 HC RX W HCPCS: Performed by: NURSE ANESTHETIST, CERTIFIED REGISTERED

## 2018-12-04 PROCEDURE — 3700000000 HC ANESTHESIA ATTENDED CARE: Performed by: INTERNAL MEDICINE

## 2018-12-04 PROCEDURE — 3609010600 HC COLONOSCOPY POLYPECTOMY SNARE/COLD BIOPSY: Performed by: INTERNAL MEDICINE

## 2018-12-04 PROCEDURE — 2709999900 HC NON-CHARGEABLE SUPPLY: Performed by: INTERNAL MEDICINE

## 2018-12-04 PROCEDURE — 85018 HEMOGLOBIN: CPT

## 2018-12-04 PROCEDURE — 7100000001 HC PACU RECOVERY - ADDTL 15 MIN: Performed by: INTERNAL MEDICINE

## 2018-12-04 PROCEDURE — 0DBM8ZX EXCISION OF DESCENDING COLON, VIA NATURAL OR ARTIFICIAL OPENING ENDOSCOPIC, DIAGNOSTIC: ICD-10-PCS | Performed by: INTERNAL MEDICINE

## 2018-12-04 PROCEDURE — 7100000000 HC PACU RECOVERY - FIRST 15 MIN: Performed by: INTERNAL MEDICINE

## 2018-12-04 RX ORDER — PROPOFOL 10 MG/ML
INJECTION, EMULSION INTRAVENOUS PRN
Status: DISCONTINUED | OUTPATIENT
Start: 2018-12-04 | End: 2018-12-04 | Stop reason: SDUPTHER

## 2018-12-04 RX ORDER — SODIUM CHLORIDE 9 MG/ML
INJECTION, SOLUTION INTRAVENOUS CONTINUOUS PRN
Status: DISCONTINUED | OUTPATIENT
Start: 2018-12-04 | End: 2018-12-04 | Stop reason: SDUPTHER

## 2018-12-04 RX ORDER — PEG-3350, SODIUM SULFATE, SODIUM CHLORIDE, POTASSIUM CHLORIDE, SODIUM ASCORBATE AND ASCORBIC ACID 7.5-2.691G
100 KIT ORAL ONCE
Status: COMPLETED | OUTPATIENT
Start: 2018-12-04 | End: 2018-12-04

## 2018-12-04 RX ORDER — LIDOCAINE HYDROCHLORIDE 20 MG/ML
INJECTION, SOLUTION INFILTRATION; PERINEURAL PRN
Status: DISCONTINUED | OUTPATIENT
Start: 2018-12-04 | End: 2018-12-04 | Stop reason: SDUPTHER

## 2018-12-04 RX ORDER — CLOPIDOGREL BISULFATE 75 MG/1
75 TABLET ORAL DAILY
Status: DISCONTINUED | OUTPATIENT
Start: 2018-12-05 | End: 2018-12-06 | Stop reason: HOSPADM

## 2018-12-04 RX ORDER — CLOPIDOGREL BISULFATE 75 MG/1
150 TABLET ORAL ONCE
Status: COMPLETED | OUTPATIENT
Start: 2018-12-04 | End: 2018-12-04

## 2018-12-04 RX ADMIN — Medication 10 ML: at 14:39

## 2018-12-04 RX ADMIN — METOPROLOL TARTRATE 100 MG: 50 TABLET, FILM COATED ORAL at 21:34

## 2018-12-04 RX ADMIN — HYDROCHLOROTHIAZIDE 25 MG: 25 TABLET ORAL at 14:36

## 2018-12-04 RX ADMIN — SODIUM CHLORIDE: 9 INJECTION, SOLUTION INTRAVENOUS at 11:52

## 2018-12-04 RX ADMIN — POLYETHYLENE GLYCOL 3350, SODIUM SULFATE, SODIUM CHLORIDE, POTASSIUM CHLORIDE, ASCORBIC ACID, SODIUM ASCORBATE 100 G: KIT at 09:16

## 2018-12-04 RX ADMIN — PROPOFOL 30 MG: 10 INJECTION, EMULSION INTRAVENOUS at 11:57

## 2018-12-04 RX ADMIN — PROPOFOL 20 MG: 10 INJECTION, EMULSION INTRAVENOUS at 12:01

## 2018-12-04 RX ADMIN — POLYETHYLENE GLYCOL 3350, SODIUM SULFATE, SODIUM CHLORIDE, POTASSIUM CHLORIDE, ASCORBIC ACID, SODIUM ASCORBATE 100 G: KIT at 07:35

## 2018-12-04 RX ADMIN — CYANOCOBALAMIN TAB 1000 MCG 250 MCG: 1000 TAB at 14:37

## 2018-12-04 RX ADMIN — PROPOFOL 60 MG: 10 INJECTION, EMULSION INTRAVENOUS at 11:53

## 2018-12-04 RX ADMIN — SODIUM CHLORIDE: 9 INJECTION, SOLUTION INTRAVENOUS at 01:42

## 2018-12-04 RX ADMIN — PROPOFOL 20 MG: 10 INJECTION, EMULSION INTRAVENOUS at 11:55

## 2018-12-04 RX ADMIN — PANTOPRAZOLE SODIUM 40 MG: 40 INJECTION, POWDER, FOR SOLUTION INTRAVENOUS at 14:38

## 2018-12-04 RX ADMIN — IRON SUCROSE 200 MG: 20 INJECTION, SOLUTION INTRAVENOUS at 17:29

## 2018-12-04 RX ADMIN — PROPOFOL 40 MG: 10 INJECTION, EMULSION INTRAVENOUS at 12:15

## 2018-12-04 RX ADMIN — INSULIN LISPRO 3 UNITS: 100 INJECTION, SOLUTION INTRAVENOUS; SUBCUTANEOUS at 21:34

## 2018-12-04 RX ADMIN — LOSARTAN POTASSIUM 100 MG: 100 TABLET, FILM COATED ORAL at 14:37

## 2018-12-04 RX ADMIN — PANTOPRAZOLE SODIUM 40 MG: 40 INJECTION, POWDER, FOR SOLUTION INTRAVENOUS at 21:34

## 2018-12-04 RX ADMIN — PROPOFOL 20 MG: 10 INJECTION, EMULSION INTRAVENOUS at 12:19

## 2018-12-04 RX ADMIN — PROPOFOL 30 MG: 10 INJECTION, EMULSION INTRAVENOUS at 12:06

## 2018-12-04 RX ADMIN — METOPROLOL TARTRATE 100 MG: 50 TABLET, FILM COATED ORAL at 14:36

## 2018-12-04 RX ADMIN — INSULIN LISPRO 4 UNITS: 100 INJECTION, SOLUTION INTRAVENOUS; SUBCUTANEOUS at 09:16

## 2018-12-04 RX ADMIN — PROPOFOL 40 MG: 10 INJECTION, EMULSION INTRAVENOUS at 12:10

## 2018-12-04 RX ADMIN — LIDOCAINE HYDROCHLORIDE 60 MG: 20 INJECTION, SOLUTION INFILTRATION; PERINEURAL at 11:53

## 2018-12-04 RX ADMIN — CLOPIDOGREL 150 MG: 75 TABLET, FILM COATED ORAL at 15:56

## 2018-12-04 RX ADMIN — PROPOFOL 30 MG: 10 INJECTION, EMULSION INTRAVENOUS at 12:04

## 2018-12-04 RX ADMIN — DESMOPRESSIN ACETATE 40 MG: 0.2 TABLET ORAL at 21:34

## 2018-12-04 RX ADMIN — Medication 10 ML: at 21:34

## 2018-12-04 ASSESSMENT — PULMONARY FUNCTION TESTS
PIF_VALUE: 0

## 2018-12-04 ASSESSMENT — PAIN SCALES - GENERAL
PAINLEVEL_OUTOF10: 0

## 2018-12-04 ASSESSMENT — LIFESTYLE VARIABLES: SMOKING_STATUS: 0

## 2018-12-04 ASSESSMENT — PAIN - FUNCTIONAL ASSESSMENT: PAIN_FUNCTIONAL_ASSESSMENT: 0-10

## 2018-12-04 NOTE — PROGRESS NOTES
angina. Objective:     BP (!) 151/73   Pulse 78   Temp 98 °F (36.7 °C) (Temporal)   Resp 18   Ht 6' 2\" (1.88 m)   Wt 255 lb 8 oz (115.9 kg)   SpO2 100%   BMI 32.80 kg/m²      Intake/Output Summary (Last 24 hours) at 12/04/18 1555  Last data filed at 12/04/18 1302   Gross per 24 hour   Intake              940 ml   Output                0 ml   Net              940 ml       Physical Exam:  General:  Awake, alert, NAD  Skin:  Warm and dry  Neck:  JVD<8, no bruit  Chest:  Clear to auscultation, no wheezes/rhonchi/rales  Cardiovascular:  RRR S1S2  Abdomen:  Soft, nontender, +bowel sounds  Extremities:  no edema, right radial site looks unremarkable     Medications:    clopidogrel  150 mg Oral Once    [START ON 12/5/2018] clopidogrel  75 mg Oral Daily    insulin lispro  0-12 Units Subcutaneous TID WC    iron sucrose (VENOFER) iv piggyback 100 mL (Admin over 60 minutes)  200 mg Intravenous Q24H    vitamin B-12  250 mcg Oral Daily    losartan  100 mg Oral Daily    And    hydrochlorothiazide  25 mg Oral Daily    metoprolol  100 mg Oral BID    atorvastatin  40 mg Oral Nightly    aspirin  81 mg Oral Daily    pantoprazole  40 mg Intravenous BID       sodium chloride flush, acetaminophen, tiZANidine, glucose, glucagon (rDNA), sodium chloride flush, magnesium hydroxide, ondansetron, magnesium sulfate, potassium chloride **OR** potassium bicarb-citric acid **OR** potassium chloride, nitroGLYCERIN    Lab Data:  CBC:   Recent Labs      12/02/18   1219   12/03/18   0734  12/03/18   1939  12/04/18   0750   WBC  4.2   --   3.7*   --    --    HGB  9.3*  9.2*   < >  9.8*  9.6*  9.8*   PLT  101*   --   98*   --    --     < > = values in this interval not displayed. BMP:    Recent Labs      12/02/18   0608   NA  139   K  4.2   CO2  26   BUN  10   CREATININE  1.1     LIVR:   No results for input(s): AST, ALT in the last 72 hours. PT/INR:   No results for input(s): PROT, INR in the last 72 hours.   APTT: No results for input(s): APTT in the last 72 hours. BNP:  No results for input(s): BNP in the last 72 hours. CARDIAC ENZYMES:  No results for input(s): CKMB, CKMBINDEX, TROPONINI in the last 72 hours. Invalid input(s): CKTOTAL;3  FASTING LIPID PANEL:  Lab Results   Component Value Date    CHOL 135 12/01/2018    HDL 30 12/01/2018    TRIG 99 12/01/2018       Diagnostics:    Jolie Crimes   There is a moderate sized fixed inferior defect suggestive of scar. There is mild inferior hypokinesis with EF=50%. High risk treadmill with Guillermo treadmill score of -15. EGD: 12/4/18  Impression:    - Large non-bleeding esophageal varices                          - mild portal hypertensive gastropathy                          - Possible small varix in the cardia                          - Mild nodularity to the duodenum. Biopsied.                           - Mild erythema in the antrum.         Recommendations:   - Await pathology                                              - I would prefer to change pt to propranolol or nadolol and titrate dose to a resting pulse of 55-60 for variceal prophylaxis                                      - Await abdominal US for eval of cirrhosis. - Colonoscopy. - Although patient has varices and is higher risk for bleeding, if the patient needs antiplatelet therapy or anticoagulation for CAD, it can and should be used. Impression:                            - 3mm, 4mm ascending colon polyps. Removed. - 4mm proximal descending colon polyp. Removed. - Internal hemorrhoids. Recommendations:  - Await pathology. - Repeat colonoscopy in 3-5 years pending pathology. Cardiac angiogram: 12/3/18  Cath with 70% proxRCA, 85% discrete distal RCA,40% prox LAD.  Normal EF  Will ask GI service to proceed with colonoscopy and endoscopy to evaluate for GI blood loss prior to proceeding with intervention of RCA         Patient Active Problem List   Diagnosis    Hypertension    Cyst of joint of hand    High triglycerides    Dupuytren's contracture of right hand    Rosacea    Thrombocytopenia (HCC)    Chronic ITP (idiopathic thrombocytopenia) (HCC)    Iron deficiency anemia    Anemia, iron deficiency    Chest pain    Non-STEMI (non-ST elevated myocardial infarction) ([facility] [address]       Assessment/plan:   ~Non- Stemi      On ASA, Losartan, Lopressor, and Lipitor        Patient denies any anginal symptoms    12/3/18 patient underwent a cardiac angiogram showed Cath with 70% proxRCA, 85% discrete distal RCA,40% prox LAD. Normal EF. Plan: schedule PCI of RCA on 12/5/18    ~ Anemia      Stable      Followed per GI and Hematology        ~ Hyperlipidemia      On Lipitor     ~ Hypertension      B/P's stable     Plan: continue current medications     Dispo:      Please see orders. Discussed with patient and nursing.       The patient is on a beta-blocker  The patient is on an ace-i/ARB  The patient is on a statin  The patient is not on antiplatelet therapy  The patient does not have history of AF, and is not on anticoagulation    Signed:  Giovanna Hill, [address]  [phone]

## 2018-12-04 NOTE — ONCOLOGY
input(s): KETONESU  Magnesium:   Lab Results   Component Value Date    MG 1.90 11/30/2018     CARL:  Lab Results   Component Value Date    CARL Neg 11/21/2016       RADIOLOGY:    Xr Chest Standard (2 Vw)    Result Date: 11/30/2018  EXAMINATION: TWO VIEWS OF THE CHEST, 11/30/2018 4:54 pm COMPARISON: None HISTORY: ORDERING SYSTEM PROVIDED HISTORY: CP TECHNOLOGIST PROVIDED HISTORY: Reason for exam:->CP Ordering Physician Provided Reason for Exam: CP Acuity: Acute Type of Exam: Initial FINDINGS: The cardiomediastinal silhouette is normal.  No acute airspace disease. No pleural effusion. No pulmonary edema. Mild elevation of the right hemidiaphragm. Calcifications seen along the surface of the right hemidiaphragm. Evidence for old granulomatous disease. 1. No acute abnormality seen in the chest. 2. Calcification along the surface of the right hemidiaphragm most likely from an old inflammatory process or trauma. Stress/rest Nm Myocardial Spect - Do Not Uncheck    Result Date: 11/30/2018  Cardiac Perfusion Imaging  Demographics   Patient Name       Rivka Zavala   Date of Study      11/30/2018         Gender              Male   Patient Number     [id number]         Date of Birth       1950   Visit Number       [id number]          Age                 77 year(s)   Accession Number   [id number]          Room Number   Corporate ID       [id number]           NM Technician       Mariano Stokes, GILA   Interpreting        Leesa Mitchell                                        Physician           MD, Alisson Mayfield MD   The procedure was explained in detail to the patient. Risks,  complications and alternative treatments were reviewed. Written consent  was obtained.   Procedure Procedure Type:   Nuclear Stress Test:Exercise, NM MYOCARDIAL SPECT REST EXERCISE OR RX   Study location: [facility],[address] Floor Unit Medicine   Indications: Chest pain. Hospital Status: Outpatient. Risk Factors   The patient risk factors include:obesity, physical activity, treated and  controlled hypertension, diabetes mellitus and dyslipidemia. Conclusions   Summary  There is a moderate sized fixed inferior defect suggestive of scar. There is mild inferior hypokinesis with EF=50%. High risk treadmill with Guillermo treadmill score of -15. Recommendation  Pt sent to ER for admission. Stress Protocols   Resting ECG  Normal sinus rhythm. Resting HR:125 bpm  Resting BP:158/66 mmHg  Stress Protocol:Exercise - Jose Antonio  Peak HR:162 bpm                   HR/BP product:17642  Peak BP:171/72 mmHg               Max exercise: 5.4 METS  Predicted HR: 150 bpm  % of predicted HR: 108  Test duration:2 min and 31 sec  Reason for termination:Chest pain   ECG Findings  Abnormal ECG portion of stress test with 2 mm ST  horizontal depression with stress consistent with  ischemia. Symptoms  Pt developed chest pain radiating to left neck, jaw,  shoulder, and back. Pain relieved with rest. 1 spray of  lingual nitroglycerin administered. Complications  Procedure complication was none. Imaging Protocols   - One Day   Rest                          Stress   Isotope:Myoview/Tetrofosmin   Isotope: Myoview/Tetrofosmin  Isotope dose:10.9 mCi         Isotope dose:31.5 mCi  Administration Route:I.V.      Administration Route:I.V.  Date:11/30/2018 12:55         Date:11/30/2018 14:41                                 Technique:      Gated  Imaging Results    Stress ejection    Ejection fraction:50 %    EDV :126 ml    ESV :63 ml    Stroke volume :63 ml    LV mass :154 gr  Medical History  Signatures   ------------------------------------------------------------------  Electronically signed by Cornelius Schmidt MD, [facility] (Interpreting  physician) on 11/30/2018 at 16:21

## 2018-12-04 NOTE — PROGRESS NOTES
Hospital Medicine Progress Note     Date:  12/4/2018    PCP: Gregg Newell (Tel: [phone])    Date of Admission: 11/30/2018      Brief hospital course: Patient was sent to the ER for an abnormal outpatient stress test.  Patient also has a past medical history of chronic thrombocytopenia, iron deficiency anemia. Patient had a left heart cath on 12/3/18 revealing 70% proxRCA, 85% discrete distal RCA,40% prox LAD. Normal EF. Subjective  No new complaints. Objective  Physical exam:  Vitals: BP (!) 151/73   Pulse 78   Temp 98 °F (36.7 °C) (Temporal)   Resp 18   Ht 6' 2\" (1.88 m)   Wt 255 lb 8 oz (115.9 kg)   SpO2 100%   BMI 32.80 kg/m²   Gen: Not in distress. Alert. Head: Normocephalic. Atraumatic. Eyes: EOMI. Good acuity. ENT: Oral mucosa moist  Neck: No JVD. No obvious thyromegaly. CVS: Nml S1S2, no MRG, RRR  Pulmomary: Clear bilaterally. No crackles. No wheezes. Gastrointestinal: Soft, NT/ND. Positive bowel sounds. Musculoskeletal: No edema. Warm  Neuro: No focal deficit. Moves extremity spontaneously. Psychiatry: Appropriate affect. Not agitated. Skin: Warm, dry with normal turgor. No rash      24HR INTAKE/OUTPUT:      Intake/Output Summary (Last 24 hours) at 12/04/18 1519  Last data filed at 12/04/18 1302   Gross per 24 hour   Intake              940 ml   Output                0 ml   Net              940 ml     I/O last 3 completed shifts: In: 940 [P.O.:240; I.V.:700]  Out: 0   No intake/output data recorded.       Meds:    clopidogrel  150 mg Oral Once    [START ON 12/5/2018] clopidogrel  75 mg Oral Daily    insulin lispro  0-12 Units Subcutaneous TID WC    iron sucrose (VENOFER) iv piggyback 100 mL (Admin over 60 minutes)  200 mg Intravenous Q24H    vitamin B-12  250 mcg Oral Daily    losartan  100 mg Oral Daily    And    hydrochlorothiazide  25 mg Oral Daily    metoprolol  100 mg Oral BID    atorvastatin  40 mg Oral Nightly    aspirin  81 mg Oral Daily    pantoprazole  40 mg Intravenous BID       Infusions:         PRN Meds: sodium chloride flush, acetaminophen, tiZANidine, glucose, glucagon (rDNA), sodium chloride flush, magnesium hydroxide, ondansetron, magnesium sulfate, potassium chloride **OR** potassium bicarb-citric acid **OR** potassium chloride, nitroGLYCERIN    Labs/imaging:  CBC:   Recent Labs      12/02/18   1219   12/03/18   0734  12/03/18   1939  12/04/18   0750   WBC  4.2   --   3.7*   --    --    HGB  9.3*  9.2*   < >  9.8*  9.6*  9.8*   PLT  101*   --   98*   --    --     < > = values in this interval not displayed. BMP:    Recent Labs      12/02/18   0608   NA  139   K  4.2   CL  102   CO2  26   BUN  10   CREATININE  1.1   GLUCOSE  229*         Hepatic: No results for input(s): AST, ALT, ALB, BILITOT, ALKPHOS in the last 72 hours. Troponin:   No results for input(s): TROPONINI in the last 72 hours. BNP: No results for input(s): BNP in the last 72 hours. INR:   No results for input(s): INR in the last 72 hours. Reviewed imaging and reports noted      Assessment:  Active Problems:    Chest pain    Non-STEMI (non-ST elevated myocardial infarction) ([address]  Resolved Problems:    * No resolved hospital problems.  *        Plan:  Abnormal stress test  - Cardiology following and plan for PCI of RCA tomm  - on asp, statin, plavix, BB      CAD  - on asp, statin, BB, plavix      JOSE LUIS  - Hematology and GI following  - started on IV Venofer  - EGD on 12/4/18 Revealed large nonbleeding esophageal varices, mild portal hypertensive gastropathy, possible small varix in the cardia, mild erythema in the antrum, mild nodularity to the duodenum biopsied  - Colonoscopy on 12/4/18 revealed 3 mm, 4 mm ascending colon polyps which were removed, 4 mm proximal descending colon polyp removed, internal hemorrhoids - recommended to have a repeat colonoscopy in 3-5 years      Esophageal Varices  - GI following and recommend to cont asp/plavix for CAD w/

## 2018-12-04 NOTE — PROGRESS NOTES
Pt c/o numbness and tingling to right hand. Coban removed, site checked under Tban and no new bleeding noted only old blood. Removed 2 ml's from the Tban and waited at bedside. Pt started to regain sensation to right hand and no bleeding noted from right radial cath site. Pt instructed to notify RN if any shauna bleeding was noted from right radial cath. Wife at bedside and call light within reach.

## 2018-12-04 NOTE — ANESTHESIA PRE PROCEDURE
in dextrose 5% 100 mL IVPB  1 g Intravenous PRN Clayton Schmidt MD        potassium chloride (KLOR-CON M) extended release tablet 40 mEq  40 mEq Oral PRN Tricia Patterson MD   40 mEq at 12/01/18 1051    Or    potassium bicarb-citric acid (EFFER-K) effervescent tablet 40 mEq  40 mEq Oral PRN Clayton Schmidt MD        Or    potassium chloride 10 mEq/100 mL IVPB (Peripheral Line)  10 mEq Intravenous PRN Tricia Patterson MD        nitroGLYCERIN (NITROSTAT) SL tablet 0.4 mg  0.4 mg Sublingual Q5 Min PRN Tricia Patterson MD        pantoprazole (PROTONIX) injection 40 mg  40 mg Intravenous BID Tricia Patterson MD   40 mg at 12/03/18 2057       Allergies:     Allergies   Allergen Reactions    No Known Allergies        Problem List:    Patient Active Problem List   Diagnosis Code    Hypertension I10    Cyst of joint of hand M25.849    High triglycerides E78.1    Dupuytren's contracture of right hand M72.0    Rosacea L71.9    Thrombocytopenia ([facility]) D69.6    Chronic ITP (idiopathic thrombocytopenia) ([facility]) D69.3    Iron deficiency anemia D50.9    Anemia, iron deficiency D50.9    Chest pain R07.9    Non-STEMI (non-ST elevated myocardial infarction) ([facility]) I21.4       Past Medical History:        Diagnosis Date    Cyst of joint of hand     right hand ganglion cyst    Hypertension     Type II or unspecified type diabetes mellitus without mention of complication, not stated as uncontrolled        Past Surgical History:        Procedure Laterality Date    BACK SURGERY      CYST REMOVAL      INGUINAL HERNIA REPAIR Left 12/11/15    laparoscopic       Social History:    Social History   Substance Use Topics    Smoking status: Never Smoker    Smokeless tobacco: Never Used    Alcohol use No      Comment: rare                                Counseling given: Not Answered      Vital Signs (Current):   Vitals:    12/04/18 0059 12/04/18 0310 12/04/18 0423 12/04/18 0744   BP:  122/68  (!) 149/69   Pulse: 75 78 80 72 Airway: Mallampati: III  TM distance: >3 FB   Neck ROM: full  Mouth opening: > = 3 FB Dental: normal exam         Pulmonary:Negative Pulmonary ROS and normal exam  breath sounds clear to auscultation      (-) COPD, asthma, sleep apnea and not a current smoker                           Cardiovascular:    (+) hypertension:, angina (had w/ exertion, none currently at rest):, past MI (NSTEMI):, CAD (Cath 12/3/18 (yest) RCA 70-85% and LAD 40% blockages, normal EF):, hyperlipidemia    (-) CABG/stent, dysrhythmias and  CHF    ECG reviewed  Rhythm: regular  Rate: normal  Echocardiogram reviewed  Stress test reviewed  Cleared by cardiology     Beta Blocker:  Dose within 24 Hrs      ROS comment: Per cards, GI service to proceed with colonoscopy and endoscopy to evaluate for GI blood loss prior to proceeding with intervention of RCA     Neuro/Psych:   Negative Neuro/Psych ROS     (-) seizures, TIA and CVA           GI/Hepatic/Renal: Neg GI/Hepatic/Renal ROS       (-) GERD, liver disease and no renal disease       Endo/Other:    (+) DiabetesType II DM, , blood dyscrasia (Fe def anemia, chronic ITP, plts stable around 100k): anemia and thrombocytopenia:., .    (-) hypothyroidism, hyperthyroidism               Abdominal:   (+) obese,         Vascular:                                        Anesthesia Plan      MAC     ASA 3       Induction: intravenous. Anesthetic plan and risks discussed with patient. Plan discussed with CRNA.                   Ida Moritz, MD   12/4/2018

## 2018-12-04 NOTE — PROGRESS NOTES
Pt refusing Moviprep stating he is only to have an EGD with Dr. Trevor Severance tomorrow not a colonoscopy. Checked MD Estela Freeman notes and only see the mention of an EGD. Consent signed on day shift with GILA Castillo for an EGD.

## 2018-12-05 LAB
ANION GAP SERPL CALCULATED.3IONS-SCNC: 8 MMOL/L (ref 3–16)
BUN BLDV-MCNC: 8 MG/DL (ref 7–20)
CALCIUM SERPL-MCNC: 9.4 MG/DL (ref 8.3–10.6)
CHLORIDE BLD-SCNC: 108 MMOL/L (ref 99–110)
CO2: 24 MMOL/L (ref 21–32)
CREAT SERPL-MCNC: 1 MG/DL (ref 0.8–1.3)
GFR AFRICAN AMERICAN: >60
GFR NON-AFRICAN AMERICAN: >60
GLUCOSE BLD-MCNC: 181 MG/DL (ref 70–99)
GLUCOSE BLD-MCNC: 195 MG/DL (ref 70–99)
GLUCOSE BLD-MCNC: 197 MG/DL (ref 70–99)
GLUCOSE BLD-MCNC: 227 MG/DL (ref 70–99)
GLUCOSE BLD-MCNC: 236 MG/DL (ref 70–99)
HCT VFR BLD CALC: 29.9 % (ref 40.5–52.5)
HEMOGLOBIN: 10 G/DL (ref 13.5–17.5)
HEPATITIS B CORE IGM ANTIBODY: NORMAL
HEPATITIS B SURFACE ANTIGEN INTERPRETATION: NORMAL
HEPATITIS C ANTIBODY INTERPRETATION: NORMAL
MCH RBC QN AUTO: 27.1 PG (ref 26–34)
MCHC RBC AUTO-ENTMCNC: 33.3 G/DL (ref 31–36)
MCV RBC AUTO: 81.4 FL (ref 80–100)
PDW BLD-RTO: 16.5 % (ref 12.4–15.4)
PERFORMED ON: ABNORMAL
PLATELET # BLD: 94 K/UL (ref 135–450)
PMV BLD AUTO: 8 FL (ref 5–10.5)
POC ACT LR: 277 SEC
POTASSIUM REFLEX MAGNESIUM: 3.8 MMOL/L (ref 3.5–5.1)
RBC # BLD: 3.68 M/UL (ref 4.2–5.9)
SODIUM BLD-SCNC: 140 MMOL/L (ref 136–145)
WBC # BLD: 3.9 K/UL (ref 4–11)

## 2018-12-05 PROCEDURE — 2709999900 HC NON-CHARGEABLE SUPPLY

## 2018-12-05 PROCEDURE — 6360000004 HC RX CONTRAST MEDICATION: Performed by: INTERNAL MEDICINE

## 2018-12-05 PROCEDURE — 6360000002 HC RX W HCPCS: Performed by: INTERNAL MEDICINE

## 2018-12-05 PROCEDURE — C1887 CATHETER, GUIDING: HCPCS

## 2018-12-05 PROCEDURE — 82390 ASSAY OF CERULOPLASMIN: CPT

## 2018-12-05 PROCEDURE — 87340 HEPATITIS B SURFACE AG IA: CPT

## 2018-12-05 PROCEDURE — 99152 MOD SED SAME PHYS/QHP 5/>YRS: CPT | Performed by: INTERNAL MEDICINE

## 2018-12-05 PROCEDURE — 92928 PRQ TCAT PLMT NTRAC ST 1 LES: CPT | Performed by: INTERNAL MEDICINE

## 2018-12-05 PROCEDURE — 027034Z DILATION OF CORONARY ARTERY, ONE ARTERY WITH DRUG-ELUTING INTRALUMINAL DEVICE, PERCUTANEOUS APPROACH: ICD-10-PCS | Performed by: INTERNAL MEDICINE

## 2018-12-05 PROCEDURE — 6370000000 HC RX 637 (ALT 250 FOR IP)

## 2018-12-05 PROCEDURE — 80048 BASIC METABOLIC PNL TOTAL CA: CPT

## 2018-12-05 PROCEDURE — 2100000000 HC CCU R&B

## 2018-12-05 PROCEDURE — 6370000000 HC RX 637 (ALT 250 FOR IP): Performed by: INTERNAL MEDICINE

## 2018-12-05 PROCEDURE — 86705 HEP B CORE ANTIBODY IGM: CPT

## 2018-12-05 PROCEDURE — 6360000002 HC RX W HCPCS

## 2018-12-05 PROCEDURE — 84155 ASSAY OF PROTEIN SERUM: CPT

## 2018-12-05 PROCEDURE — 86708 HEPATITIS A ANTIBODY: CPT

## 2018-12-05 PROCEDURE — C1760 CLOSURE DEV, VASC: HCPCS

## 2018-12-05 PROCEDURE — 86803 HEPATITIS C AB TEST: CPT

## 2018-12-05 PROCEDURE — C9600 PERC DRUG-EL COR STENT SING: HCPCS | Performed by: INTERNAL MEDICINE

## 2018-12-05 PROCEDURE — 86704 HEP B CORE ANTIBODY TOTAL: CPT

## 2018-12-05 PROCEDURE — C1769 GUIDE WIRE: HCPCS

## 2018-12-05 PROCEDURE — C9113 INJ PANTOPRAZOLE SODIUM, VIA: HCPCS | Performed by: INTERNAL MEDICINE

## 2018-12-05 PROCEDURE — 85027 COMPLETE CBC AUTOMATED: CPT

## 2018-12-05 PROCEDURE — C1725 CATH, TRANSLUMIN NON-LASER: HCPCS

## 2018-12-05 PROCEDURE — 85347 COAGULATION TIME ACTIVATED: CPT

## 2018-12-05 PROCEDURE — 2580000003 HC RX 258

## 2018-12-05 PROCEDURE — 2580000003 HC RX 258: Performed by: INTERNAL MEDICINE

## 2018-12-05 PROCEDURE — 36415 COLL VENOUS BLD VENIPUNCTURE: CPT

## 2018-12-05 PROCEDURE — 82103 ALPHA-1-ANTITRYPSIN TOTAL: CPT

## 2018-12-05 PROCEDURE — C1894 INTRO/SHEATH, NON-LASER: HCPCS

## 2018-12-05 PROCEDURE — 84165 PROTEIN E-PHORESIS SERUM: CPT

## 2018-12-05 PROCEDURE — C1874 STENT, COATED/COV W/DEL SYS: HCPCS

## 2018-12-05 PROCEDURE — 99153 MOD SED SAME PHYS/QHP EA: CPT | Performed by: INTERNAL MEDICINE

## 2018-12-05 PROCEDURE — 82104 ALPHA-1-ANTITRYPSIN PHENO: CPT

## 2018-12-05 PROCEDURE — 2500000003 HC RX 250 WO HCPCS

## 2018-12-05 RX ORDER — SODIUM CHLORIDE 0.9 % (FLUSH) 0.9 %
10 SYRINGE (ML) INJECTION EVERY 12 HOURS SCHEDULED
Status: CANCELLED | OUTPATIENT
Start: 2018-12-05

## 2018-12-05 RX ORDER — SODIUM CHLORIDE 0.9 % (FLUSH) 0.9 %
10 SYRINGE (ML) INJECTION PRN
Status: CANCELLED | OUTPATIENT
Start: 2018-12-05

## 2018-12-05 RX ORDER — SODIUM CHLORIDE 9 MG/ML
INJECTION, SOLUTION INTRAVENOUS CONTINUOUS
Status: DISCONTINUED | OUTPATIENT
Start: 2018-12-05 | End: 2018-12-05

## 2018-12-05 RX ORDER — SODIUM CHLORIDE 9 MG/ML
INJECTION, SOLUTION INTRAVENOUS
Status: COMPLETED
Start: 2018-12-05 | End: 2018-12-05

## 2018-12-05 RX ORDER — SODIUM CHLORIDE 9 MG/ML
INJECTION, SOLUTION INTRAVENOUS CONTINUOUS
Status: CANCELLED | OUTPATIENT
Start: 2018-12-05 | End: 2018-12-05

## 2018-12-05 RX ORDER — SODIUM CHLORIDE 9 MG/ML
INJECTION, SOLUTION INTRAVENOUS CONTINUOUS
Status: DISCONTINUED | OUTPATIENT
Start: 2018-12-05 | End: 2018-12-06

## 2018-12-05 RX ORDER — HYDRALAZINE HYDROCHLORIDE 20 MG/ML
INJECTION INTRAMUSCULAR; INTRAVENOUS
Status: DISCONTINUED
Start: 2018-12-05 | End: 2018-12-05 | Stop reason: WASHOUT

## 2018-12-05 RX ADMIN — DESMOPRESSIN ACETATE 40 MG: 0.2 TABLET ORAL at 22:11

## 2018-12-05 RX ADMIN — CLOPIDOGREL 75 MG: 75 TABLET, FILM COATED ORAL at 08:38

## 2018-12-05 RX ADMIN — SODIUM CHLORIDE 100 ML/HR: 9 INJECTION, SOLUTION INTRAVENOUS at 09:36

## 2018-12-05 RX ADMIN — METOPROLOL TARTRATE 100 MG: 50 TABLET, FILM COATED ORAL at 08:37

## 2018-12-05 RX ADMIN — LOSARTAN POTASSIUM 100 MG: 100 TABLET, FILM COATED ORAL at 08:37

## 2018-12-05 RX ADMIN — IOPAMIDOL 58 ML: 755 INJECTION, SOLUTION INTRAVENOUS at 14:18

## 2018-12-05 RX ADMIN — PANTOPRAZOLE SODIUM 40 MG: 40 INJECTION, POWDER, FOR SOLUTION INTRAVENOUS at 08:37

## 2018-12-05 RX ADMIN — ASPIRIN 81 MG CHEWABLE TABLET 81 MG: 81 TABLET CHEWABLE at 08:37

## 2018-12-05 RX ADMIN — SODIUM CHLORIDE 1000 ML: 9 INJECTION, SOLUTION INTRAVENOUS at 11:54

## 2018-12-05 RX ADMIN — HYDROCHLOROTHIAZIDE 25 MG: 25 TABLET ORAL at 14:59

## 2018-12-05 RX ADMIN — INSULIN LISPRO 2 UNITS: 100 INJECTION, SOLUTION INTRAVENOUS; SUBCUTANEOUS at 08:36

## 2018-12-05 RX ADMIN — PANTOPRAZOLE SODIUM 40 MG: 40 INJECTION, POWDER, FOR SOLUTION INTRAVENOUS at 22:10

## 2018-12-05 RX ADMIN — INSULIN LISPRO 4 UNITS: 100 INJECTION, SOLUTION INTRAVENOUS; SUBCUTANEOUS at 11:52

## 2018-12-05 RX ADMIN — INSULIN LISPRO 2 UNITS: 100 INJECTION, SOLUTION INTRAVENOUS; SUBCUTANEOUS at 17:19

## 2018-12-05 RX ADMIN — Medication 10 ML: at 22:11

## 2018-12-05 RX ADMIN — CYANOCOBALAMIN TAB 1000 MCG 250 MCG: 1000 TAB at 08:37

## 2018-12-05 RX ADMIN — METOPROLOL TARTRATE 100 MG: 50 TABLET, FILM COATED ORAL at 22:10

## 2018-12-05 ASSESSMENT — PAIN SCALES - GENERAL
PAINLEVEL_OUTOF10: 0
PAINLEVEL_OUTOF10: 0

## 2018-12-05 NOTE — PROGRESS NOTES
4041)         PRN Meds: sodium chloride flush, acetaminophen, tiZANidine, glucose, glucagon (rDNA), sodium chloride flush, magnesium hydroxide, ondansetron, magnesium sulfate, potassium chloride **OR** potassium bicarb-citric acid **OR** potassium chloride, nitroGLYCERIN    Labs/imaging:  CBC:   Recent Labs      12/03/18   0734   12/04/18   0750  12/04/18   1837  12/05/18   0447   WBC  3.7*   --    --    --   3.9*   HGB  9.8*   < >  9.8*  9.4*  10.0*   PLT  98*   --    --    --   94*    < > = values in this interval not displayed. BMP:    Recent Labs      12/05/18   0447   NA  140   K  3.8   CL  108   CO2  24   BUN  8   CREATININE  1.0   GLUCOSE  195*         Hepatic: No results for input(s): AST, ALT, ALB, BILITOT, ALKPHOS in the last 72 hours. Troponin:   No results for input(s): TROPONINI in the last 72 hours. BNP: No results for input(s): BNP in the last 72 hours. INR:   No results for input(s): INR in the last 72 hours. Reviewed imaging and reports noted      Assessment:  Active Problems:    Chest pain    Non-STEMI (non-ST elevated myocardial infarction) ([address]  Resolved Problems:    * No resolved hospital problems.  *        Plan:  Abnormal stress test  - Cardiology following , s/p 2 VOLODYMYR in prox & distal RCA on 12/5/18  - on asp, statin, plavix, BB (cardio prefers DAPT for 6 mos but atleast for 1 month)      CAD  - on asp, statin, BB, plavix      JOSE LUIS  - Hematology and GI following  - started on IV Venofer  - EGD on 12/4/18 Revealed large nonbleeding esophageal varices, mild portal hypertensive gastropathy, possible small varix in the cardia, mild erythema in the antrum, mild nodularity to the duodenum biopsied  - Colonoscopy on 12/4/18 revealed 3 mm, 4 mm ascending colon polyps which were removed, 4 mm proximal descending colon polyp removed, internal hemorrhoids - recommended to have a repeat colonoscopy in 3-5 years      Esophageal Varices  - GI following and recommend to cont

## 2018-12-05 NOTE — PRE SEDATION
4 Units at 12/02/18 1734    iron sucrose (VENOFER) 200 mg in sodium chloride 0.9 % 100 mL IVPB  200 mg Intravenous Q24H Iain Larios MD   Stopped at 12/02/18 1928    vitamin B-12 (CYANOCOBALAMIN) tablet 250 mcg  250 mcg Oral Daily Iain Larios MD   250 mcg at 12/03/18 5781    ferrous sulfate tablet 325 mg  325 mg Oral Daily with breakfast Meghan Coffee, APRN - CNP   325 mg at 12/02/18 0914    losartan (COZAAR) tablet 100 mg  100 mg Oral Daily Patti Castaneda MD   100 mg at 12/03/18 3332    And    hydrochlorothiazide (HYDRODIURIL) tablet 25 mg  25 mg Oral Daily Clayton Wisdom MD   25 mg at 12/02/18 0914    metoprolol tartrate (LOPRESSOR) tablet 100 mg  100 mg Oral BID Patti Castaneda MD   100 mg at 12/03/18 0951    tiZANidine (ZANAFLEX) tablet 4 mg  4 mg Oral Q8H PRN Clayton Wisdom MD        glucose (GLUTOSE) 40 % oral gel 15 g  15 g Oral PRN Clayton Wisdom MD        dextrose 50 % solution 12.5 g  12.5 g Intravenous PRN Clayton Wisdom MD        glucagon (rDNA) injection 1 mg  1 mg Intramuscular PRN Clayton Wisdom MD        dextrose 5 % solution  100 mL/hr Intravenous PRN Clayton Wisdom MD        sodium chloride flush 0.9 % injection 10 mL  10 mL Intravenous 2 times per day Patti Castaneda MD   10 mL at 12/02/18 2043    sodium chloride flush 0.9 % injection 10 mL  10 mL Intravenous PRN Patti Castaneda MD   10 mL at 12/01/18 2011    magnesium hydroxide (MILK OF MAGNESIA) 400 MG/5ML suspension 30 mL  30 mL Oral Daily PRN Patti Castaneda MD        ondansetron [facility]) injection 4 mg  4 mg Intravenous Q6H PRN Patti Castaneda MD        atorvastatin (LIPITOR) tablet 40 mg  40 mg Oral Nightly Patti Castaneda MD   40 mg at 12/02/18 2042    aspirin chewable tablet 81 mg  81 mg Oral Daily Patti Castaneda MD   81 mg at 12/02/18 0914    magnesium sulfate 1 g in dextrose 5% 100 mL IVPB  1 g Intravenous PRN Patti Castaneda MD        potassium chloride (Merly BECKER)
plan.    Medication Planned:  midazolam intravenously, fentanyl intravenously    Patient is an appropriate candidate for plan of sedation: yes      Electronically signed by Sarah Giles MD on 12/5/2018 at 10:12 AM

## 2018-12-06 VITALS
HEIGHT: 74 IN | HEART RATE: 80 BPM | WEIGHT: 247.36 LBS | RESPIRATION RATE: 16 BRPM | OXYGEN SATURATION: 99 % | SYSTOLIC BLOOD PRESSURE: 151 MMHG | TEMPERATURE: 98.4 F | BODY MASS INDEX: 31.75 KG/M2 | DIASTOLIC BLOOD PRESSURE: 61 MMHG

## 2018-12-06 LAB
ANION GAP SERPL CALCULATED.3IONS-SCNC: 9 MMOL/L (ref 3–16)
BUN BLDV-MCNC: 8 MG/DL (ref 7–20)
CALCIUM SERPL-MCNC: 9.1 MG/DL (ref 8.3–10.6)
CHLORIDE BLD-SCNC: 106 MMOL/L (ref 99–110)
CO2: 24 MMOL/L (ref 21–32)
CREAT SERPL-MCNC: 1 MG/DL (ref 0.8–1.3)
GFR AFRICAN AMERICAN: >60
GFR NON-AFRICAN AMERICAN: >60
GLUCOSE BLD-MCNC: 198 MG/DL (ref 70–99)
GLUCOSE BLD-MCNC: 199 MG/DL (ref 70–99)
GLUCOSE BLD-MCNC: 216 MG/DL (ref 70–99)
HCT VFR BLD CALC: 29 % (ref 40.5–52.5)
HEMOGLOBIN: 9.6 G/DL (ref 13.5–17.5)
MCH RBC QN AUTO: 27.1 PG (ref 26–34)
MCHC RBC AUTO-ENTMCNC: 33.3 G/DL (ref 31–36)
MCV RBC AUTO: 81.2 FL (ref 80–100)
PDW BLD-RTO: 17.1 % (ref 12.4–15.4)
PERFORMED ON: ABNORMAL
PERFORMED ON: ABNORMAL
PLATELET # BLD: 88 K/UL (ref 135–450)
PMV BLD AUTO: 8.1 FL (ref 5–10.5)
POTASSIUM REFLEX MAGNESIUM: 3.8 MMOL/L (ref 3.5–5.1)
RBC # BLD: 3.56 M/UL (ref 4.2–5.9)
SODIUM BLD-SCNC: 139 MMOL/L (ref 136–145)
WBC # BLD: 3.9 K/UL (ref 4–11)

## 2018-12-06 PROCEDURE — 6370000000 HC RX 637 (ALT 250 FOR IP): Performed by: NURSE PRACTITIONER

## 2018-12-06 PROCEDURE — 85027 COMPLETE CBC AUTOMATED: CPT

## 2018-12-06 PROCEDURE — 80048 BASIC METABOLIC PNL TOTAL CA: CPT

## 2018-12-06 PROCEDURE — 99232 SBSQ HOSP IP/OBS MODERATE 35: CPT | Performed by: NURSE PRACTITIONER

## 2018-12-06 PROCEDURE — 6370000000 HC RX 637 (ALT 250 FOR IP): Performed by: INTERNAL MEDICINE

## 2018-12-06 PROCEDURE — 6360000002 HC RX W HCPCS: Performed by: INTERNAL MEDICINE

## 2018-12-06 PROCEDURE — C9113 INJ PANTOPRAZOLE SODIUM, VIA: HCPCS | Performed by: INTERNAL MEDICINE

## 2018-12-06 RX ORDER — ATORVASTATIN CALCIUM 40 MG/1
40 TABLET, FILM COATED ORAL NIGHTLY
Qty: 30 TABLET | Refills: 1 | Status: SHIPPED | OUTPATIENT
Start: 2018-12-06 | End: 2019-01-30 | Stop reason: SDUPTHER

## 2018-12-06 RX ORDER — NADOLOL 80 MG/1
80 TABLET ORAL DAILY
Qty: 30 TABLET | Refills: 1 | Status: SHIPPED | OUTPATIENT
Start: 2018-12-06

## 2018-12-06 RX ORDER — OMEPRAZOLE 40 MG/1
40 CAPSULE, DELAYED RELEASE ORAL DAILY
Qty: 30 CAPSULE | Refills: 0 | Status: SHIPPED | OUTPATIENT
Start: 2018-12-06 | End: 2019-06-04 | Stop reason: ALTCHOICE

## 2018-12-06 RX ORDER — CLOPIDOGREL BISULFATE 75 MG/1
75 TABLET ORAL DAILY
Qty: 30 TABLET | Refills: 1 | Status: SHIPPED | OUTPATIENT
Start: 2018-12-07 | End: 2019-02-11 | Stop reason: SDUPTHER

## 2018-12-06 RX ORDER — ACETAMINOPHEN 80 MG
TABLET,CHEWABLE ORAL
Status: DISCONTINUED
Start: 2018-12-06 | End: 2018-12-06 | Stop reason: HOSPADM

## 2018-12-06 RX ADMIN — METOPROLOL TARTRATE 100 MG: 50 TABLET, FILM COATED ORAL at 08:43

## 2018-12-06 RX ADMIN — INSULIN LISPRO 2 UNITS: 100 INJECTION, SOLUTION INTRAVENOUS; SUBCUTANEOUS at 09:12

## 2018-12-06 RX ADMIN — HYDROCHLOROTHIAZIDE 25 MG: 25 TABLET ORAL at 09:18

## 2018-12-06 RX ADMIN — CLOPIDOGREL 75 MG: 75 TABLET, FILM COATED ORAL at 08:43

## 2018-12-06 RX ADMIN — INSULIN LISPRO 2 UNITS: 100 INJECTION, SOLUTION INTRAVENOUS; SUBCUTANEOUS at 01:18

## 2018-12-06 RX ADMIN — LOSARTAN POTASSIUM 100 MG: 100 TABLET, FILM COATED ORAL at 08:42

## 2018-12-06 RX ADMIN — PANTOPRAZOLE SODIUM 40 MG: 40 INJECTION, POWDER, FOR SOLUTION INTRAVENOUS at 08:42

## 2018-12-06 RX ADMIN — ASPIRIN 81 MG CHEWABLE TABLET 81 MG: 81 TABLET CHEWABLE at 08:43

## 2018-12-06 RX ADMIN — CYANOCOBALAMIN TAB 1000 MCG 250 MCG: 1000 TAB at 08:42

## 2018-12-06 ASSESSMENT — PAIN SCALES - GENERAL
PAINLEVEL_OUTOF10: 0
PAINLEVEL_OUTOF10: 0

## 2018-12-06 NOTE — PLAN OF CARE
Problem: Discharge Planning:  Goal: Ability to perform activities of daily living will improve  Ability to perform activities of daily living will improve  Outcome: Ongoing  Doing well post cath . Up to shower, performing ADL without problems. Plan for discharge to home today. Problem: Pain - Acute:  Goal: Recognizes and communicates pain  Recognizes and communicates pain  Outcome: Ongoing  Pt alert and oriented. Pt able to communicate present pain and use the pain scale appropriately. Nonpharmacological pain reducers and pain medication offered as needed. Will cont to monitor.

## 2018-12-06 NOTE — PROGRESS NOTES
[facility]   Progress Note      SUBJECTIVE:      Events noted. Had PCI with VOLODYMYR x 2 in prox and distal RCA yesterday. No external bleeding. No chest pain. Right groin no bruises or hematoma. OBJECTIVE    Physical  VITALS:  BP (!) 151/61   Pulse 80   Temp 98.4 °F (36.9 °C) (Temporal)   Resp 16   Ht 6' 2\" (1.88 m)   Wt 247 lb 5.7 oz (112.2 kg)   SpO2 99%   BMI 31.76 kg/m²   TEMPERATURE:  Current - Temp: 98.4 °F (36.9 °C); Max - Temp  Av.2 °F (36.8 °C)  Min: 97.9 °F (36.6 °C)  Max: 98.4 °F (36.9 °C)  BLOOD PRESSURE RANGE:  Systolic (64FRW), GOX:524 , Min:138 , FBC:757   ; Diastolic (78LSX), LPQ:44, Min:56, Max:73    24HR INTAKE/OUTPUT:    Intake/Output Summary (Last 24 hours) at 18 1242  Last data filed at 18 0445   Gross per 24 hour   Intake              710 ml   Output             1300 ml   Net             -590 ml       Conscious alert oriented  HEENT: No pallor or scleral icterus. Oral mucosa: No mucositis. No thrush. Neck: Supple, no lymphadenopathy. No thyromegaly  Lungs:  respiratory efforts are normal.  CVS: S1-S2 normal.  No murmurs or gallops heard. Abdomen: Not distended  Neuro: No focal deficits. No cranial nerve palsies. Alert and oriented. Skin: No rashes, petechiae, ecchymosis. Extremities: No edema, tenderness, erythema. Right groin-no bruises or hematoma noted.     Data  Labs:  General Labs:  CBC with Differential:  Lab Results   Component Value Date    WBC 3.9 2018    RBC 3.56 2018    RBC 4.03 2016    HGB 9.6 2018    HCT 29.0 2018    PLT 88 2018    MCV 81.2 2018    MCH 27.1 2018    MCHC 33.3 2018    RDW 17.1 2018    SEGSPCT 61.7 2013    LYMPHOPCT 17.1 2018    LYMPHOPCT 30.3 2016    MONOPCT 9.0 2018    BASOPCT 0.6 2018    MONOSABS 0.3 2018    LYMPHSABS 0.6 2018    EOSABS 0.2 2018    BASOSABS 0.0 2018    DIFFTYPE Auto-K 2013     BMP:

## 2018-12-06 NOTE — PROGRESS NOTES
- Repeat colonoscopy in 3-5 years pending pathology. Cardiac angiogram: 12/3/18  Cath with 70% proxRCA, 85% discrete distal RCA,40% prox LAD. Normal EF  Will ask GI service to proceed with colonoscopy and endoscopy to evaluate for GI blood loss prior to proceeding with intervention of RCA       Cardiac angiogram/ PCI of RCA: 12/5/18  Patient with markedly abnormal stress with inferior ischemia. Cath done with 75% prox RCA,85% distal RCA. Now post GI w/up with varices identified. GI service cleared him for dual antiplatelet therapy ,now post transfusion     PCI with VOLODYMYR x 2 in prox and distal RCA. He will need dual antiplatelet therapy,prefer for 6 months but at least 1 month    Liver ultrasound: 12/4/18  Liver is diffusely increased in echogenicity which may represent diffuse hepatic steatosis or diffuse hepatocellular disease. Cholelithiasis without evidence of acute cholecystitis. Splenomegaly. Patient Active Problem List   Diagnosis    Hypertension    Cyst of joint of hand    High triglycerides    Dupuytren's contracture of right hand    Rosacea    Thrombocytopenia (HCC)    Chronic ITP (idiopathic thrombocytopenia) (HCC)    Iron deficiency anemia    Anemia, iron deficiency    Chest pain    Non-STEMI (non-ST elevated myocardial infarction) ([facility] [address]       Assessment/plan:   ~Non- Stemi      On ASA, Losartan, Lopressor, Plavix, and Lipitor        Patient denies any anginal symptoms    12/3/18 patient underwent a cardiac angiogram showed Cath with 70% proxRCA, 85% discrete distal RCA,40% prox LAD.  Normal EF.   12/5/18: PCI of RCA       Plan: continue medications, follow up in two weeks     ~ Anemia      Stable      Followed per GI and Hematology      ~Chronic ITP   11/26/18 Plt count 124, Today's Plt count 88  Followed per Hematology     ~ Hyperlipidemia      On Lipitor     ~ Hypertension      B/P's stable     Plan: continue current medications Dispo:    Cardiac standpoint ok for discharge, repeat CBC in one week, follow up with cardiology in two weeks. Please see orders. Discussed with patient and nursing.       The patient is on a beta-blocker  The patient is on an ace-i/ARB  The patient is on a statin  The patient is on antiplatelet therapy  The patient does not have history of AF, and is not on anticoagulation    Signed:  Perlita Ricketts, [address]  [phone]

## 2018-12-06 NOTE — DISCHARGE SUMMARY
Hospital Discharge Summary    Patient's PCP: Tez Raul  Admit Date: 11/30/2018   Discharge Date: 12/6/2018    Admitting Physician: Dr. Marley Lobato MD  Discharge Physician: Dr. Marv Hall:   IP CONSULT TO HOSPITALIST  IP CONSULT TO GI  IP CONSULT TO CARDIOLOGY  IP CONSULT TO GI  IP CONSULT TO ONCOLOGY  IP CONSULT TO CARDIAC REHAB    Brief HPI:   India Rosa is a 77 y.o. male who Was sent to the emergency department for a positive stress test that was done as an outpatient at our hospital.  The patient and the wife at the bedside both were describing the situation. Per the wife the patient's problem started several weeks ago as lightheadedness and shortness of breath with activity. She said that he was found to be anemic with hemoglobin that was low, and his primary care provider did send him to follow with the GI doctor. The patient symptoms could worsen over 7 days and his hemoglobin dropped from 9-7 and he was sent to receive blood transfusion when he felt that he has chest pain and shoulder pain have the GI doctor did consult cardiologist to clear him for a colposcopic procedure. Cardiologist didn't order a stress test during which was abnormal, I looked for the stress test in the Epic/chart. Following results, further ED department physician there was some ST depression. Brief hospital course:    Abnormal stress test  - Cardiology following , s/p 2 VOLODYMYR in prox & distal RCA on 12/5/18  - on asp, statin, plavix, BB (cardio prefers DAPT for 6 mos but atleast for 1 month)        CAD  - on asp, statin, BB, plavix        JOSE LUIS  - Hematology and GI following  - started on IV Venofer  - EGD on 12/4/18 Revealed large nonbleeding esophageal varices, mild portal hypertensive gastropathy, possible small varix in the cardia, mild erythema in the antrum, mild nodularity to the duodenum biopsied  - Colonoscopy on 12/4/18 revealed 3 mm, 4 mm ascending colon polyps which were [id number]    Phone:  [phone]   · atorvastatin 40 MG tablet  · clopidogrel 75 MG tablet  · metFORMIN 1000 MG tablet  · nadolol 80 MG tablet  · omeprazole 40 MG delayed release capsule         Activity: activity as tolerated  Diet: DIET CARDIAC; No Caffeine      Disposition: home  Discharged Condition: Stable  Follow Up:   [place] [id number] [facility], [address]          Mihai Oropeza MD              Code status:  Full Code         Total time spent on discharge, finalizing medications, referrals and arranging outpatient follow up was more than 1 hour      Thank you Dr. Alondra Blanco for the opportunity to be involved in this patients care.

## 2018-12-07 LAB
ALBUMIN SERPL-MCNC: 2.6 G/DL (ref 3.1–4.9)
ALPHA-1 ANTITRYPSIN PHENOTYPE: NORMAL
ALPHA-1 ANTITRYPSIN: 159 MG/DL (ref 90–200)
ALPHA-1-GLOBULIN: 0.2 G/DL (ref 0.2–0.4)
ALPHA-2-GLOBULIN: 0.4 G/DL (ref 0.4–1.1)
BETA GLOBULIN: 0.9 G/DL (ref 0.9–1.6)
CERULOPLASMIN: 30 MG/DL (ref 15–30)
GAMMA GLOBULIN: 0.8 G/DL (ref 0.6–1.8)
HAV AB SERPL IA-ACNC: NEGATIVE
HEPATITIS B CORE TOTAL ANTIBODY: NEGATIVE
SPE/IFE INTERPRETATION: NORMAL
TOTAL PROTEIN: 5 G/DL (ref 6.4–8.2)

## 2018-12-13 ENCOUNTER — OFFICE VISIT (OUTPATIENT)
Dept: CARDIOLOGY CLINIC | Age: 68
End: 2018-12-13
Payer: MEDICARE

## 2018-12-13 VITALS
HEIGHT: 74 IN | HEART RATE: 73 BPM | SYSTOLIC BLOOD PRESSURE: 142 MMHG | OXYGEN SATURATION: 100 % | DIASTOLIC BLOOD PRESSURE: 64 MMHG | BODY MASS INDEX: 32.83 KG/M2 | WEIGHT: 255.8 LBS

## 2018-12-13 DIAGNOSIS — I21.4 NON-STEMI (NON-ST ELEVATED MYOCARDIAL INFARCTION) (HCC): ICD-10-CM

## 2018-12-13 DIAGNOSIS — I25.10 CORONARY ARTERY DISEASE INVOLVING NATIVE CORONARY ARTERY OF NATIVE HEART WITHOUT ANGINA PECTORIS: Primary | ICD-10-CM

## 2018-12-13 DIAGNOSIS — I10 ESSENTIAL HYPERTENSION: ICD-10-CM

## 2018-12-13 DIAGNOSIS — E78.2 MIXED HYPERLIPIDEMIA: ICD-10-CM

## 2018-12-13 PROCEDURE — 1111F DSCHRG MED/CURRENT MED MERGE: CPT | Performed by: NURSE PRACTITIONER

## 2018-12-13 PROCEDURE — G8417 CALC BMI ABV UP PARAM F/U: HCPCS | Performed by: NURSE PRACTITIONER

## 2018-12-13 PROCEDURE — G8427 DOCREV CUR MEDS BY ELIG CLIN: HCPCS | Performed by: NURSE PRACTITIONER

## 2018-12-13 PROCEDURE — 4040F PNEUMOC VAC/ADMIN/RCVD: CPT | Performed by: NURSE PRACTITIONER

## 2018-12-13 PROCEDURE — 1123F ACP DISCUSS/DSCN MKR DOCD: CPT | Performed by: NURSE PRACTITIONER

## 2018-12-13 PROCEDURE — 3017F COLORECTAL CA SCREEN DOC REV: CPT | Performed by: NURSE PRACTITIONER

## 2018-12-13 PROCEDURE — 1101F PT FALLS ASSESS-DOCD LE1/YR: CPT | Performed by: NURSE PRACTITIONER

## 2018-12-13 PROCEDURE — G8598 ASA/ANTIPLAT THER USED: HCPCS | Performed by: NURSE PRACTITIONER

## 2018-12-13 PROCEDURE — G8482 FLU IMMUNIZE ORDER/ADMIN: HCPCS | Performed by: NURSE PRACTITIONER

## 2018-12-13 PROCEDURE — 99214 OFFICE O/P EST MOD 30 MIN: CPT | Performed by: NURSE PRACTITIONER

## 2018-12-13 PROCEDURE — 1036F TOBACCO NON-USER: CPT | Performed by: NURSE PRACTITIONER

## 2018-12-13 RX ORDER — GLIPIZIDE 10 MG/1
10 TABLET ORAL DAILY
COMMUNITY

## 2018-12-13 RX ORDER — ASPIRIN 325 MG
325 TABLET ORAL DAILY
COMMUNITY
End: 2019-04-22 | Stop reason: DRUGHIGH

## 2018-12-13 NOTE — LETTER
crackles or wheezing. CARDIOVASCULAR:  Regular rate 76 and rhythm with no murmurs, gallops, rubs, or abnormal heart sounds, normal PMI. The apical impulses not displaced. Heart tones are crisp and normal                                                                                            Cervical veins are not engorged                 JVP less than 8 cm H2O                                                                              The carotid upstroke is normal in amplitude and contour without delay or bruit    ABDOMEN:  Normal bowel sounds, non-distended and non-tender to palpation   EXT: No edema, no calf tenderness. Pulses are present bilaterally.     DATA:    Lab Results   Component Value Date    ALT 29 11/30/2018    AST 28 11/30/2018    ALKPHOS 94 11/30/2018    BILITOT 0.7 12/01/2018     Lab Results   Component Value Date    CREATININE 1.0 12/06/2018    BUN 8 12/06/2018     12/06/2018    K 3.8 12/06/2018     12/06/2018    CO2 24 12/06/2018     Lab Results   Component Value Date    TSH 3.17 03/08/2013     Lab Results   Component Value Date    WBC 3.9 (L) 12/06/2018    HGB 9.6 (L) 12/06/2018    HCT 29.0 (L) 12/06/2018    MCV 81.2 12/06/2018    PLT 88 (L) 12/06/2018     No components found for: CHLPL  Lab Results   Component Value Date    TRIG 99 12/01/2018    TRIG 164 (H) 06/06/2016    TRIG 179 (H) 11/05/2015     Lab Results   Component Value Date    HDL 30 (L) 12/01/2018    HDL 34 (L) 06/06/2016    HDL 36 (L) 11/05/2015     Lab Results   Component Value Date    LDLCALC 85 12/01/2018    LDLCALC 122 (H) 06/06/2016    LDLCALC 119 (H) 11/05/2015     Lab Results   Component Value Date    LABVLDL 20 12/01/2018    LABVLDL 33 06/06/2016    LABVLDL 36 11/05/2015     Radiology Review:  Pertinent images / reports were reviewed as a part of this visit and reveals the following:    Nuclear myoview 11/30/18:     Dina Yin

## 2018-12-13 NOTE — PROGRESS NOTES
MG tablet Take 1 tablet by mouth 2 times daily (with meals) 180 tablet 1    atorvastatin (LIPITOR) 40 MG tablet Take 1 tablet by mouth nightly 30 tablet 1    clopidogrel (PLAVIX) 75 MG tablet Take 1 tablet by mouth daily 30 tablet 1    omeprazole (PRILOSEC) 40 MG delayed release capsule Take 1 capsule by mouth daily 30 capsule 0    nadolol (CORGARD) 80 MG tablet Take 1 tablet by mouth daily (Patient taking differently: Take 40 mg by mouth daily ) 30 tablet 1    ferrous sulfate 325 (65 Fe) MG tablet Take 325 mg by mouth daily (with breakfast)      glipiZIDE (GLUCOTROL) 5 MG tablet Take 1 tablet by mouth 3 times daily (Patient taking differently: Take 10 mg by mouth 2 times daily (before meals) ) 270 tablet 1    losartan-hydrochlorothiazide (HYZAAR) 100-25 MG per tablet Take 1 tablet by mouth daily 90 tablet 1    aspirin 81 MG tablet Take 81 mg by mouth daily      Multiple Vitamins-Minerals (MULTIVITAMIN PO) Take 1 tablet by mouth daily      CINNAMON PO Take 1,000 mg by mouth 2 times daily      glucose blood VI test strips (FREESTYLE LITE) strip Test blood sugar once daily. 100 each 3    tiZANidine (ZANAFLEX) 4 MG tablet Take 1 tablet by mouth every 6 hours as needed (as needed) 30 tablet 3     No current facility-administered medications for this visit. REVIEW OF SYSTEMS:   CONSTITUTIONAL: No major weight gain or loss, fatigue, weakness, night sweats or fever. There's been no change in energy level, sleep pattern, or activity level. HEENT: No new vision difficulties or ringing in the ears. RESPIRATORY: No new SOB, PND, orthopnea or cough. CARDIOVASCULAR: See HPI  GI: No nausea, vomiting, diarrhea, constipation, abdominal pain or changes in bowel habits. : No urinary frequency, urgency, incontinence hematuria or dysuria. SKIN: No cyanosis or skin lesions. MUSCULOSKELETAL: No new muscle or joint pain. NEUROLOGICAL: No syncope or TIA-like symptoms.   PSYCHIATRIC: No anxiety, pain, insomnia

## 2018-12-18 NOTE — COMMUNICATION BODY
liver  -portal HTN c/w cirrhosis   - switch Metoprolol to nadolol      HTN  - Blood pressure stable, continue losartan 100 mg daily, hydrochlorothiazide 25 mg daily, will change Lopressor 100 mg twice a day to nadolol 80mg QD, CTM     Chronic ITP  - mgmt per Hem/Onc     Patient will have a repeat CBC in 4 days on Monday, 12/10/2018, will follow up with his hematologist next week and with cardiology per their records. Patient will need to stay on dual antiplatelet therapy for at least a minimum of 1 month and preferably for 6 months per cardiology.      Flores Barrera is 77 y.o. male who presents today for a routine follow up after a recent hospitalization related to the above mentioned issues. He was having blood loss for 6 months. He was having difficult with SOB and discomfort in his left shoulder (attributed to his anemia)  Subjective:   Since the time of discharge, the patient admits their symptoms have improved. Yesterday after running errands he had discomfort in his Rt upper chest that was brief in duration. He denies significant chest pain. There is is a little SOUZA, nothing like it was PTA (transfusions). He denies orthopnea/PND. He denies swelling since discharged; his wt went down from 255# to 247#. The patient is not experiencing palpitations. These symptoms are improving over the last few days. His home BP runs ~ 127/63 (78)  With regard to medication therapy the patient has been compliant with prescribed regimen. They have tolerated therapy to date.      Current Outpatient Prescriptions   Medication Sig Dispense Refill    metFORMIN (GLUCOPHAGE) 1000 MG tablet Take 1 tablet by mouth 2 times daily (with meals) 180 tablet 1    atorvastatin (LIPITOR) 40 MG tablet Take 1 tablet by mouth nightly 30 tablet 1    clopidogrel (PLAVIX) 75 MG tablet Take 1 tablet by mouth daily 30 tablet 1    omeprazole (PRILOSEC) 40 MG delayed release capsule Take 1 capsule by mouth daily 30 capsule 0    nadolol (CORGARD) 80 MG tablet Take 1 tablet by mouth daily (Patient taking differently: Take 40 mg by mouth daily ) 30 tablet 1    ferrous sulfate 325 (65 Fe) MG tablet Take 325 mg by mouth daily (with breakfast)      glipiZIDE (GLUCOTROL) 5 MG tablet Take 1 tablet by mouth 3 times daily (Patient taking differently: Take 10 mg by mouth 2 times daily (before meals) ) 270 tablet 1    losartan-hydrochlorothiazide (HYZAAR) 100-25 MG per tablet Take 1 tablet by mouth daily 90 tablet 1    aspirin 81 MG tablet Take 81 mg by mouth daily      Multiple Vitamins-Minerals (MULTIVITAMIN PO) Take 1 tablet by mouth daily      CINNAMON PO Take 1,000 mg by mouth 2 times daily             tiZANidine (ZANAFLEX) 4 MG tablet Take 1 tablet by mouth every 6 hours as needed (as needed) 30 tablet 3       Objective:   PHYSICAL EXAM:       Vitals:    12/13/18 1047 12/13/18 1115   BP: (!) 120/58 (!) 142/64   Pulse: 73    SpO2: 100%    Weight: 255 lb 12.8 oz (116 kg)    Height: 6' 2\" (1.88 m)        VITALS:  BP (!) 120/58   Pulse 73   Ht 6' 2\" (1.88 m)   Wt 255 lb 12.8 oz (116 kg)   SpO2 100%   BMI 32.84 kg/m²      CONSTITUTIONAL: Cooperative, no apparent distress, and appears well nourished / developed  NEUROLOGIC:  Awake and orientated to person, place and time. PSYCH: Calm affect. SKIN: Warm and dry; Rt radial unremarkable, Rt groin with sm amt ecchymosis   HEENT: Sclera non-icteric, normocephalic, neck supple, no elevation of JVP, normal carotid pulses with no bruits and thyroid normal size. LUNGS:  No increased work of breathing and clear to auscultation, no crackles or wheezing. CARDIOVASCULAR:  Regular rate 76 and rhythm with no murmurs, gallops, rubs, or abnormal heart sounds, normal PMI. The apical impulses not displaced.                                Heart tones are crisp and normal                                                                                            Cervical veins are not engorged                 JVP product:[id number]    Peak BP:171/72 mmHg               Max exercise: 5.4 METS    Predicted HR: 150 bpm    % of predicted HR: 108    Test duration:2 min and 31 sec    Reason for termination:Chest pain        ECG Findings    Abnormal ECG portion of stress test with 2 mm ST    horizontal depression with stress consistent with    ischemia. 12/3/18: Cath : 70% proxRCA, 85% discrete distal RCA,40% prox LAD. Normal EF  Will ask GI service to proceed with colonoscopy and endoscopy to evaluate for GI blood loss prior to proceeding with intervention of RCA    Last Angiogram: 12/5/18:   Patient with markedly abnormal stress with inferior ischemia. Cath done with 75% prox RCA,85% distal RCA. Now post GI w/up with varices identified. GI service cleared him for dual antiplatelet therapy ,now post transfusion     PCI with VOLODYMYR x 2 in prox and distal RCA. He will need dual antiplatelet therapy,prefer for 6 months but at least 1 month      Assessment:      Diagnosis Orders   1. Coronary artery disease involving native coronary artery of native heart without angina pectoris   ~s/p PTCA RCA  ~stable : c/o little SOB; H/H < 10/30 (following with GI)  ~tolerating DAPT / BB / statin     2. Non-STEMI (non-ST elevated myocardial infarction) (HCC)   ~elevated troponin  ~normal LVEF  ~metoprolol to nadolol during hospitalization (newly diag cirrhosis / varices)     3. Essential hypertension   ~controlled    4. Mixed hyperlipidemia   ~LDL improved from 109 to 80  ~atorvastatin         Patient  is stable since hospital discharge. Plan:  Fasting lipid profile in the next few weeks   appt in 6-8 weeks    Further evaluation will be based upon the patient's clinical course   All questions and concerns were addressed to the patient/family. Alternatives to  treatment were discussed. Thank you for allowing to us to participate in the care of John MoDc [address] 79

## 2019-01-02 ENCOUNTER — TELEPHONE (OUTPATIENT)
Dept: CARDIOLOGY CLINIC | Age: 69
End: 2019-01-02

## 2019-01-30 ENCOUNTER — OFFICE VISIT (OUTPATIENT)
Dept: CARDIOLOGY CLINIC | Age: 69
End: 2019-01-30
Payer: MEDICARE

## 2019-01-30 VITALS
WEIGHT: 250.3 LBS | SYSTOLIC BLOOD PRESSURE: 120 MMHG | BODY MASS INDEX: 32.12 KG/M2 | HEIGHT: 74 IN | HEART RATE: 64 BPM | DIASTOLIC BLOOD PRESSURE: 70 MMHG

## 2019-01-30 DIAGNOSIS — I10 ESSENTIAL HYPERTENSION: ICD-10-CM

## 2019-01-30 DIAGNOSIS — E78.2 MIXED HYPERLIPIDEMIA: ICD-10-CM

## 2019-01-30 DIAGNOSIS — I25.10 CORONARY ARTERY DISEASE INVOLVING NATIVE CORONARY ARTERY OF NATIVE HEART WITHOUT ANGINA PECTORIS: Primary | ICD-10-CM

## 2019-01-30 PROCEDURE — 3017F COLORECTAL CA SCREEN DOC REV: CPT | Performed by: NURSE PRACTITIONER

## 2019-01-30 PROCEDURE — G8598 ASA/ANTIPLAT THER USED: HCPCS | Performed by: NURSE PRACTITIONER

## 2019-01-30 PROCEDURE — G8417 CALC BMI ABV UP PARAM F/U: HCPCS | Performed by: NURSE PRACTITIONER

## 2019-01-30 PROCEDURE — 1101F PT FALLS ASSESS-DOCD LE1/YR: CPT | Performed by: NURSE PRACTITIONER

## 2019-01-30 PROCEDURE — G8427 DOCREV CUR MEDS BY ELIG CLIN: HCPCS | Performed by: NURSE PRACTITIONER

## 2019-01-30 PROCEDURE — 99214 OFFICE O/P EST MOD 30 MIN: CPT | Performed by: NURSE PRACTITIONER

## 2019-01-30 PROCEDURE — 1123F ACP DISCUSS/DSCN MKR DOCD: CPT | Performed by: NURSE PRACTITIONER

## 2019-01-30 PROCEDURE — G8482 FLU IMMUNIZE ORDER/ADMIN: HCPCS | Performed by: NURSE PRACTITIONER

## 2019-01-30 PROCEDURE — 1036F TOBACCO NON-USER: CPT | Performed by: NURSE PRACTITIONER

## 2019-01-30 PROCEDURE — 4040F PNEUMOC VAC/ADMIN/RCVD: CPT | Performed by: NURSE PRACTITIONER

## 2019-01-30 RX ORDER — ATORVASTATIN CALCIUM 40 MG/1
40 TABLET, FILM COATED ORAL NIGHTLY
Qty: 90 TABLET | Refills: 3 | Status: SHIPPED | OUTPATIENT
Start: 2019-01-30

## 2019-02-11 ENCOUNTER — TELEPHONE (OUTPATIENT)
Dept: CARDIOLOGY CLINIC | Age: 69
End: 2019-02-11

## 2019-02-11 RX ORDER — CLOPIDOGREL BISULFATE 75 MG/1
75 TABLET ORAL DAILY
Qty: 90 TABLET | Refills: 1 | Status: SHIPPED | OUTPATIENT
Start: 2019-02-11 | End: 2019-06-04 | Stop reason: ALTCHOICE

## 2019-03-05 ENCOUNTER — ANESTHESIA EVENT (OUTPATIENT)
Dept: ENDOSCOPY | Age: 69
End: 2019-03-05
Payer: MEDICARE

## 2019-03-06 ENCOUNTER — ANESTHESIA (OUTPATIENT)
Dept: ENDOSCOPY | Age: 69
End: 2019-03-06
Payer: MEDICARE

## 2019-03-06 ENCOUNTER — HOSPITAL ENCOUNTER (OUTPATIENT)
Age: 69
Setting detail: OUTPATIENT SURGERY
Discharge: HOME | End: 2019-03-06
Attending: INTERNAL MEDICINE | Admitting: INTERNAL MEDICINE
Payer: MEDICARE

## 2019-03-06 VITALS
BODY MASS INDEX: 31.63 KG/M2 | HEIGHT: 74 IN | OXYGEN SATURATION: 100 % | TEMPERATURE: 97.1 F | HEART RATE: 57 BPM | SYSTOLIC BLOOD PRESSURE: 153 MMHG | WEIGHT: 246.47 LBS | RESPIRATION RATE: 18 BRPM | DIASTOLIC BLOOD PRESSURE: 72 MMHG

## 2019-03-06 VITALS
RESPIRATION RATE: 29 BRPM | SYSTOLIC BLOOD PRESSURE: 143 MMHG | DIASTOLIC BLOOD PRESSURE: 77 MMHG | OXYGEN SATURATION: 100 %

## 2019-03-06 LAB
EKG ATRIAL RATE: 63 BPM
EKG DIAGNOSIS: NORMAL
EKG P AXIS: 39 DEGREES
EKG P-R INTERVAL: 146 MS
EKG Q-T INTERVAL: 416 MS
EKG QRS DURATION: 92 MS
EKG QTC CALCULATION (BAZETT): 425 MS
EKG R AXIS: 38 DEGREES
EKG T AXIS: 20 DEGREES
EKG VENTRICULAR RATE: 63 BPM
GLUCOSE BLD-MCNC: 106 MG/DL (ref 70–99)
GLUCOSE BLD-MCNC: 116 MG/DL (ref 70–99)
PERFORMED ON: ABNORMAL
PERFORMED ON: ABNORMAL

## 2019-03-06 PROCEDURE — 7100000010 HC PHASE II RECOVERY - FIRST 15 MIN: Performed by: INTERNAL MEDICINE

## 2019-03-06 PROCEDURE — 93010 ELECTROCARDIOGRAM REPORT: CPT | Performed by: INTERNAL MEDICINE

## 2019-03-06 PROCEDURE — 2580000003 HC RX 258: Performed by: ANESTHESIOLOGY

## 2019-03-06 PROCEDURE — 3700000001 HC ADD 15 MINUTES (ANESTHESIA): Performed by: INTERNAL MEDICINE

## 2019-03-06 PROCEDURE — 3700000000 HC ANESTHESIA ATTENDED CARE: Performed by: INTERNAL MEDICINE

## 2019-03-06 PROCEDURE — 2500000003 HC RX 250 WO HCPCS: Performed by: NURSE ANESTHETIST, CERTIFIED REGISTERED

## 2019-03-06 PROCEDURE — 7100000001 HC PACU RECOVERY - ADDTL 15 MIN: Performed by: INTERNAL MEDICINE

## 2019-03-06 PROCEDURE — 93005 ELECTROCARDIOGRAM TRACING: CPT | Performed by: ANESTHESIOLOGY

## 2019-03-06 PROCEDURE — 6360000002 HC RX W HCPCS: Performed by: NURSE ANESTHETIST, CERTIFIED REGISTERED

## 2019-03-06 PROCEDURE — 2500000003 HC RX 250 WO HCPCS: Performed by: ANESTHESIOLOGY

## 2019-03-06 PROCEDURE — 3609012300 HC EGD BAND LIGATION ESOPHGEAL/GASTRIC VARICES: Performed by: INTERNAL MEDICINE

## 2019-03-06 PROCEDURE — 7100000011 HC PHASE II RECOVERY - ADDTL 15 MIN: Performed by: INTERNAL MEDICINE

## 2019-03-06 PROCEDURE — 7100000000 HC PACU RECOVERY - FIRST 15 MIN: Performed by: INTERNAL MEDICINE

## 2019-03-06 PROCEDURE — 2709999900 HC NON-CHARGEABLE SUPPLY: Performed by: INTERNAL MEDICINE

## 2019-03-06 PROCEDURE — 2580000003 HC RX 258: Performed by: NURSE ANESTHETIST, CERTIFIED REGISTERED

## 2019-03-06 RX ORDER — LABETALOL HYDROCHLORIDE 5 MG/ML
5 INJECTION, SOLUTION INTRAVENOUS EVERY 10 MIN PRN
Status: DISCONTINUED | OUTPATIENT
Start: 2019-03-06 | End: 2019-03-06 | Stop reason: HOSPADM

## 2019-03-06 RX ORDER — PROPOFOL 10 MG/ML
INJECTION, EMULSION INTRAVENOUS CONTINUOUS PRN
Status: DISCONTINUED | OUTPATIENT
Start: 2019-03-06 | End: 2019-03-06 | Stop reason: SDUPTHER

## 2019-03-06 RX ORDER — LIDOCAINE HYDROCHLORIDE 20 MG/ML
INJECTION, SOLUTION EPIDURAL; INFILTRATION; INTRACAUDAL; PERINEURAL PRN
Status: DISCONTINUED | OUTPATIENT
Start: 2019-03-06 | End: 2019-03-06 | Stop reason: SDUPTHER

## 2019-03-06 RX ORDER — ONDANSETRON 2 MG/ML
4 INJECTION INTRAMUSCULAR; INTRAVENOUS
Status: DISCONTINUED | OUTPATIENT
Start: 2019-03-06 | End: 2019-03-06 | Stop reason: HOSPADM

## 2019-03-06 RX ORDER — SODIUM CHLORIDE 0.9 % (FLUSH) 0.9 %
10 SYRINGE (ML) INJECTION EVERY 12 HOURS SCHEDULED
Status: DISCONTINUED | OUTPATIENT
Start: 2019-03-06 | End: 2019-03-06 | Stop reason: HOSPADM

## 2019-03-06 RX ORDER — SODIUM CHLORIDE 9 MG/ML
INJECTION, SOLUTION INTRAVENOUS CONTINUOUS PRN
Status: DISCONTINUED | OUTPATIENT
Start: 2019-03-06 | End: 2019-03-06 | Stop reason: SDUPTHER

## 2019-03-06 RX ORDER — SODIUM CHLORIDE 0.9 % (FLUSH) 0.9 %
10 SYRINGE (ML) INJECTION PRN
Status: DISCONTINUED | OUTPATIENT
Start: 2019-03-06 | End: 2019-03-06 | Stop reason: HOSPADM

## 2019-03-06 RX ORDER — PROMETHAZINE HYDROCHLORIDE 25 MG/ML
6.25 INJECTION, SOLUTION INTRAMUSCULAR; INTRAVENOUS
Status: DISCONTINUED | OUTPATIENT
Start: 2019-03-06 | End: 2019-03-06 | Stop reason: HOSPADM

## 2019-03-06 RX ORDER — SODIUM CHLORIDE 9 MG/ML
INJECTION, SOLUTION INTRAVENOUS CONTINUOUS
Status: DISCONTINUED | OUTPATIENT
Start: 2019-03-06 | End: 2019-03-06 | Stop reason: HOSPADM

## 2019-03-06 RX ADMIN — SODIUM CHLORIDE: 9 INJECTION, SOLUTION INTRAVENOUS at 07:57

## 2019-03-06 RX ADMIN — FAMOTIDINE 20 MG: 10 INJECTION, SOLUTION INTRAVENOUS at 07:44

## 2019-03-06 RX ADMIN — SODIUM CHLORIDE: 9 INJECTION, SOLUTION INTRAVENOUS at 07:44

## 2019-03-06 RX ADMIN — LIDOCAINE HYDROCHLORIDE 40 MG: 20 INJECTION, SOLUTION EPIDURAL; INFILTRATION; INTRACAUDAL; PERINEURAL at 08:35

## 2019-03-06 RX ADMIN — PROPOFOL 180 MCG/KG/MIN: 10 INJECTION, EMULSION INTRAVENOUS at 08:35

## 2019-03-06 ASSESSMENT — PULMONARY FUNCTION TESTS
PIF_VALUE: 0

## 2019-03-06 ASSESSMENT — PAIN SCALES - GENERAL
PAINLEVEL_OUTOF10: 2
PAINLEVEL_OUTOF10: 0
PAINLEVEL_OUTOF10: 3

## 2019-03-06 ASSESSMENT — PAIN DESCRIPTION - FREQUENCY
FREQUENCY: INTERMITTENT
FREQUENCY: CONTINUOUS

## 2019-03-06 ASSESSMENT — PAIN - FUNCTIONAL ASSESSMENT: PAIN_FUNCTIONAL_ASSESSMENT: 0-10

## 2019-03-06 ASSESSMENT — PAIN DESCRIPTION - PROGRESSION
CLINICAL_PROGRESSION: NOT CHANGED
CLINICAL_PROGRESSION: GRADUALLY IMPROVING

## 2019-03-06 ASSESSMENT — PAIN DESCRIPTION - DESCRIPTORS: DESCRIPTORS: DISCOMFORT

## 2019-03-06 ASSESSMENT — PAIN DESCRIPTION - LOCATION
LOCATION: ABDOMEN
LOCATION: ABDOMEN

## 2019-03-06 ASSESSMENT — PAIN DESCRIPTION - PAIN TYPE
TYPE: ACUTE PAIN
TYPE: ACUTE PAIN

## 2019-03-27 NOTE — PROGRESS NOTES
C-Difficile admission screening and protocol:     * Admitted with diarrhea?no     *Prior history of C-Diff. In last 3 months?no     *Antibiotic use in the past 6-8 weeks?no     *Prior hospitalization or nursing home in the last [facility] time__0700 per pt__________        Surgery time____0830________  Hold evening dose of your oral hypoglycemic agent prior to surgery, per anesthesia preference. Hold DOS   Take the following medications with a sip of water:nadolol and losartan-htcz    Do not eat or drink anything after 12:00 midnight prior to your surgery. This includes water chewing gum, mints and ice chips. You may brush your teeth and gargle the morning of your surgery, but do not swallow the water     Please see your family doctor/pediatrician for a history and physical and/or concerning medications. Bring any test results/reports from your physicians office. If you are under the care of a heart doctor or specialist doctor, please be aware that you may be asked to them for clearance    You may be asked to stop blood thinners such as Coumadin, Plavix, Fragmin, Lovenox, etc., or any anti-inflammatories such as:  Aspirin, Ibuprofen, Advil, Naproxen prior to your surgery. We also ask that you stop any OTC medications such as fish oil, vitamin E, glucosamine, garlic, Multivitamins, COQ 10, etc.    We ask that you do not smoke 24 hours prior to surgery  We ask that you do not  drink any alcoholic beverages 24 hours prior to surgery     You must make arrangements for a responsible adult to take you home after your surgery. For your safety you will not be allowed to leave alone or drive yourself home. Your surgery will be cancelled if you do not have a ride home. Also for your safety, it is strongly suggested that someone stay with you the first 24 hours after your surgery.      A parent or legal guardian must accompany a child scheduled for surgery and plan to stay at the hospital until the child is discharged. Please do not bring other children with you. For your comfort, please wear simple loose fitting clothing to the hospital.  Please do not bring valuables. Do not wear any make-up or nail polish on your fingers or toes      For your safety, please do not wear any jewelry or body piercing's on the day of surgery. All jewelry must be removed. If you have dentures, they will be removed before going to operating room. For your convenience, we will provide you with a container. If you wear contact lenses or glasses, they will be removed, please bring a case for them. If you have a living will and a durable power of  for healthcare, please bring in a copy. As part of our patient safety program to minimize surgical site infections, we ask you to do the following:    · Please notify your surgeon if you develop any illness between         now and the  day of your surgery. · This includes a cough, cold, fever, sore throat, nausea,         or vomiting, and diarrhea, etc.  ·  Please notify your surgeon if you experience dizziness, shortness         of breath or blurred vision between now and the time of your surgery. Do not shave your operative site 96 hours prior to surgery. For face and neck surgery, men may use an electric razor 48 hours   prior to surgery. You may shower the night before surgery or the morning of   your surgery with an antibacterial soap. You will need to bring a photo ID and insurance card    [facility] [address] has an onsite pharmacy, would you like to utilize our pharmacy     If you will be staying overnight and use a C-pap machine, please bring   your C-pap to hospital     Our goal is to provide you with excellent care, therefore, visitors will be limited to two(2) in the room at a time so that we may focus on providing this care for you.           Please contact pre-admission testing if you have any further questions. [facility] phone number:  [address] PAT fax number:  [phone]  Please note these are generalized instructions for all surgical cases, you may be provided with more specific instructions according to your surgery.

## 2019-04-02 ENCOUNTER — ANESTHESIA EVENT (OUTPATIENT)
Dept: ENDOSCOPY | Age: 69
End: 2019-04-02
Payer: MEDICARE

## 2019-04-03 ENCOUNTER — ANESTHESIA (OUTPATIENT)
Dept: ENDOSCOPY | Age: 69
End: 2019-04-03
Payer: MEDICARE

## 2019-04-03 ENCOUNTER — HOSPITAL ENCOUNTER (OUTPATIENT)
Age: 69
Setting detail: OUTPATIENT SURGERY
Discharge: HOME | End: 2019-04-03
Attending: INTERNAL MEDICINE | Admitting: INTERNAL MEDICINE
Payer: MEDICARE

## 2019-04-03 VITALS
DIASTOLIC BLOOD PRESSURE: 67 MMHG | SYSTOLIC BLOOD PRESSURE: 150 MMHG | OXYGEN SATURATION: 100 % | RESPIRATION RATE: 28 BRPM

## 2019-04-03 VITALS
WEIGHT: 242.62 LBS | DIASTOLIC BLOOD PRESSURE: 74 MMHG | HEIGHT: 74 IN | RESPIRATION RATE: 12 BRPM | TEMPERATURE: 98 F | BODY MASS INDEX: 31.14 KG/M2 | OXYGEN SATURATION: 98 % | HEART RATE: 66 BPM | SYSTOLIC BLOOD PRESSURE: 162 MMHG

## 2019-04-03 LAB
GLUCOSE BLD-MCNC: 113 MG/DL (ref 70–99)
GLUCOSE BLD-MCNC: 120 MG/DL (ref 70–99)
PERFORMED ON: ABNORMAL
PERFORMED ON: ABNORMAL

## 2019-04-03 PROCEDURE — 2709999900 HC NON-CHARGEABLE SUPPLY: Performed by: INTERNAL MEDICINE

## 2019-04-03 PROCEDURE — 3700000001 HC ADD 15 MINUTES (ANESTHESIA): Performed by: INTERNAL MEDICINE

## 2019-04-03 PROCEDURE — 7100000000 HC PACU RECOVERY - FIRST 15 MIN: Performed by: INTERNAL MEDICINE

## 2019-04-03 PROCEDURE — 2580000003 HC RX 258: Performed by: ANESTHESIOLOGY

## 2019-04-03 PROCEDURE — 7100000001 HC PACU RECOVERY - ADDTL 15 MIN: Performed by: INTERNAL MEDICINE

## 2019-04-03 PROCEDURE — 6360000002 HC RX W HCPCS: Performed by: NURSE ANESTHETIST, CERTIFIED REGISTERED

## 2019-04-03 PROCEDURE — 3609012300 HC EGD BAND LIGATION ESOPHGEAL/GASTRIC VARICES: Performed by: INTERNAL MEDICINE

## 2019-04-03 PROCEDURE — 7100000010 HC PHASE II RECOVERY - FIRST 15 MIN: Performed by: INTERNAL MEDICINE

## 2019-04-03 PROCEDURE — 3700000000 HC ANESTHESIA ATTENDED CARE: Performed by: INTERNAL MEDICINE

## 2019-04-03 PROCEDURE — 7100000011 HC PHASE II RECOVERY - ADDTL 15 MIN: Performed by: INTERNAL MEDICINE

## 2019-04-03 PROCEDURE — 2500000003 HC RX 250 WO HCPCS: Performed by: NURSE ANESTHETIST, CERTIFIED REGISTERED

## 2019-04-03 RX ORDER — OXYCODONE HYDROCHLORIDE AND ACETAMINOPHEN 5; 325 MG/1; MG/1
2 TABLET ORAL PRN
Status: DISCONTINUED | OUTPATIENT
Start: 2019-04-03 | End: 2019-04-03 | Stop reason: HOSPADM

## 2019-04-03 RX ORDER — PROPOFOL 10 MG/ML
INJECTION, EMULSION INTRAVENOUS CONTINUOUS PRN
Status: DISCONTINUED | OUTPATIENT
Start: 2019-04-03 | End: 2019-04-03 | Stop reason: SDUPTHER

## 2019-04-03 RX ORDER — LIDOCAINE HYDROCHLORIDE 20 MG/ML
INJECTION, SOLUTION EPIDURAL; INFILTRATION; INTRACAUDAL; PERINEURAL PRN
Status: DISCONTINUED | OUTPATIENT
Start: 2019-04-03 | End: 2019-04-03 | Stop reason: SDUPTHER

## 2019-04-03 RX ORDER — OXYCODONE HYDROCHLORIDE AND ACETAMINOPHEN 5; 325 MG/1; MG/1
1 TABLET ORAL PRN
Status: DISCONTINUED | OUTPATIENT
Start: 2019-04-03 | End: 2019-04-03 | Stop reason: HOSPADM

## 2019-04-03 RX ORDER — FENTANYL CITRATE 50 UG/ML
50 INJECTION, SOLUTION INTRAMUSCULAR; INTRAVENOUS EVERY 5 MIN PRN
Status: DISCONTINUED | OUTPATIENT
Start: 2019-04-03 | End: 2019-04-03 | Stop reason: HOSPADM

## 2019-04-03 RX ORDER — MORPHINE SULFATE 2 MG/ML
2 INJECTION, SOLUTION INTRAMUSCULAR; INTRAVENOUS EVERY 5 MIN PRN
Status: DISCONTINUED | OUTPATIENT
Start: 2019-04-03 | End: 2019-04-03 | Stop reason: HOSPADM

## 2019-04-03 RX ORDER — MORPHINE SULFATE 2 MG/ML
1 INJECTION, SOLUTION INTRAMUSCULAR; INTRAVENOUS EVERY 5 MIN PRN
Status: DISCONTINUED | OUTPATIENT
Start: 2019-04-03 | End: 2019-04-03 | Stop reason: HOSPADM

## 2019-04-03 RX ORDER — MEPERIDINE HYDROCHLORIDE 25 MG/ML
12.5 INJECTION INTRAMUSCULAR; INTRAVENOUS; SUBCUTANEOUS EVERY 5 MIN PRN
Status: DISCONTINUED | OUTPATIENT
Start: 2019-04-03 | End: 2019-04-03 | Stop reason: HOSPADM

## 2019-04-03 RX ORDER — FENTANYL CITRATE 50 UG/ML
25 INJECTION, SOLUTION INTRAMUSCULAR; INTRAVENOUS EVERY 5 MIN PRN
Status: DISCONTINUED | OUTPATIENT
Start: 2019-04-03 | End: 2019-04-03 | Stop reason: HOSPADM

## 2019-04-03 RX ORDER — SODIUM CHLORIDE 0.9 % (FLUSH) 0.9 %
10 SYRINGE (ML) INJECTION EVERY 12 HOURS SCHEDULED
Status: DISCONTINUED | OUTPATIENT
Start: 2019-04-03 | End: 2019-04-03 | Stop reason: HOSPADM

## 2019-04-03 RX ORDER — ONDANSETRON 2 MG/ML
4 INJECTION INTRAMUSCULAR; INTRAVENOUS
Status: DISCONTINUED | OUTPATIENT
Start: 2019-04-03 | End: 2019-04-03 | Stop reason: HOSPADM

## 2019-04-03 RX ORDER — SODIUM CHLORIDE 0.9 % (FLUSH) 0.9 %
10 SYRINGE (ML) INJECTION PRN
Status: DISCONTINUED | OUTPATIENT
Start: 2019-04-03 | End: 2019-04-03 | Stop reason: HOSPADM

## 2019-04-03 RX ORDER — SODIUM CHLORIDE 9 MG/ML
INJECTION, SOLUTION INTRAVENOUS CONTINUOUS
Status: DISCONTINUED | OUTPATIENT
Start: 2019-04-03 | End: 2019-04-03 | Stop reason: HOSPADM

## 2019-04-03 RX ADMIN — PROPOFOL 180 MCG/KG/MIN: 10 INJECTION, EMULSION INTRAVENOUS at 08:39

## 2019-04-03 RX ADMIN — SODIUM CHLORIDE: 9 INJECTION, SOLUTION INTRAVENOUS at 07:44

## 2019-04-03 RX ADMIN — LIDOCAINE HYDROCHLORIDE 40 MG: 20 INJECTION, SOLUTION EPIDURAL; INFILTRATION; INTRACAUDAL; PERINEURAL at 08:39

## 2019-04-03 RX ADMIN — SODIUM CHLORIDE: 9 INJECTION, SOLUTION INTRAVENOUS at 08:26

## 2019-04-03 ASSESSMENT — PULMONARY FUNCTION TESTS
PIF_VALUE: 0

## 2019-04-03 ASSESSMENT — PAIN SCALES - GENERAL
PAINLEVEL_OUTOF10: 0
PAINLEVEL_OUTOF10: 4
PAINLEVEL_OUTOF10: 2

## 2019-04-03 ASSESSMENT — PAIN DESCRIPTION - PAIN TYPE
TYPE: OTHER (COMMENT)
TYPE: OTHER (COMMENT)

## 2019-04-03 ASSESSMENT — PAIN DESCRIPTION - ONSET: ONSET: GRADUAL

## 2019-04-03 ASSESSMENT — PAIN DESCRIPTION - PROGRESSION: CLINICAL_PROGRESSION: GRADUALLY IMPROVING

## 2019-04-03 ASSESSMENT — LIFESTYLE VARIABLES: SMOKING_STATUS: 0

## 2019-04-03 ASSESSMENT — PAIN DESCRIPTION - DESCRIPTORS
DESCRIPTORS: ACHING
DESCRIPTORS: TIGHTNESS

## 2019-04-03 ASSESSMENT — PAIN DESCRIPTION - LOCATION
LOCATION: CHEST
LOCATION: CHEST

## 2019-04-03 ASSESSMENT — PAIN - FUNCTIONAL ASSESSMENT: PAIN_FUNCTIONAL_ASSESSMENT: 0-10

## 2019-04-03 ASSESSMENT — PAIN DESCRIPTION - FREQUENCY
FREQUENCY: INTERMITTENT
FREQUENCY: INTERMITTENT

## 2019-04-03 NOTE — ANESTHESIA PRE PROCEDURE
Department of Anesthesiology  Preprocedure Note       Name:  Braden Mclaughlin   Age:  77 y.o.  :  1950                                          MRN:  [id number]         Date:  4/3/2019      Surgeon: Angel Grewal):  Lise Briones MD    Procedure: ESOPHAGOGASTRODUODENOSCOPY WITH BANDING (N/A )    Medications prior to admission:   Prior to Admission medications    Medication Sig Start Date End Date Taking? Authorizing Provider   clopidogrel (PLAVIX) 75 MG tablet Take 1 tablet by mouth daily 19  Yes PATRICK Donovan CNP   atorvastatin (LIPITOR) 40 MG tablet Take 1 tablet by mouth nightly 19  Yes PATRICK Donovan CNP   aspirin 325 MG tablet Take 325 mg by mouth daily   Yes Historical Provider, MD   glipiZIDE (GLUCOTROL) 10 MG tablet Take 10 mg by mouth 2 times daily (before meals)   Yes Historical Provider, MD   metFORMIN (GLUCOPHAGE) 1000 MG tablet Take 1 tablet by mouth 2 times daily (with meals) 18  Yes Efrain Brown MD   omeprazole (PRILOSEC) 40 MG delayed release capsule Take 1 capsule by mouth daily 18  Yes Efrain Brown MD   nadolol (CORGARD) 80 MG tablet Take 1 tablet by mouth daily  Patient taking differently: Take 40 mg by mouth daily 40 mg daily 18  Yes Efrain Brown MD   ferrous sulfate 325 (65 Fe) MG tablet Take 325 mg by mouth 2 times daily    Yes Historical Provider, MD   losartan-hydrochlorothiazide (HYZAAR) 100-25 MG per tablet Take 1 tablet by mouth daily 17  Yes Mirta Leyva MD   Multiple Vitamins-Minerals (MULTIVITAMIN PO) Take 1 tablet by mouth daily   Yes Historical Provider, MD   CINNAMON PO Take 1,000 mg by mouth 2 times daily   Yes Historical Provider, MD   glucose blood VI test strips (FREESTYLE LITE) strip Test blood sugar once daily.  10/6/14   Mirta Leyva MD       Current medications:    Current Facility-Administered Medications   Medication Dose Route Frequency Provider Last Rate Last Dose    0.9 % sodium chloride infusion   Intravenous Continuous Jemal Lyles MD        sodium chloride flush 0.9 % injection 10 mL  10 mL Intravenous 2 times per day Jemal Lyles MD        sodium chloride flush 0.9 % injection 10 mL  10 mL Intravenous PRN Jemal Lyles MD           Allergies:     Allergies   Allergen Reactions    No Known Allergies        Problem List:    Patient Active Problem List   Diagnosis Code    Hypertension I10    Cyst of joint of hand M25.849    High triglycerides E78.1    Dupuytren's contracture of right hand M72.0    Rosacea L71.9    Thrombocytopenia (HCC) D69.6    Chronic ITP (idiopathic thrombocytopenia) ([facility]) D69.3    Iron deficiency anemia D50.9    Anemia, iron deficiency D50.9    Chest pain R07.9    Non-STEMI (non-ST elevated myocardial infarction) ([facility]) I21.4       Past Medical History:        Diagnosis Date    Arthritis     CAD (coronary artery disease)     Cyst of joint of hand     right hand ganglion cyst    Hypertension     Type II or unspecified type diabetes mellitus without mention of complication, not stated as uncontrolled     Wears glasses        Past Surgical History:        Procedure Laterality Date    BACK SURGERY  11/25/2016    L3, L4,L5 remove spurs    COLONOSCOPY  12/04/2018    wiht polypectomy x3    COLONOSCOPY N/A 12/4/2018    COLONOSCOPY POLYPECTOMY SNARE/COLD BIOPSY performed by Chhaya Martinez MD at [facility]  12/04/2018    x2 stents-70% and 85% blocked    CYST REMOVAL      on arm    INGUINAL HERNIA REPAIR Left 12/11/15    laparoscopic    UPPER GASTROINTESTINAL ENDOSCOPY  12/04/2018    with biopsy    UPPER GASTROINTESTINAL ENDOSCOPY N/A 12/4/2018    EGD BIOPSY performed by Chhaya Martinez MD at [facility] N/A 3/6/2019    EGD BAND LIGATION performed by Kimmy Vega MD at [facility] History:    Social History     Tobacco Use    Smoking Oralia Munoz MD  April 3, 2019  7:32 AM      Darwin Laen MD   4/3/2019

## 2019-04-03 NOTE — ANESTHESIA POSTPROCEDURE EVALUATION
Department of Anesthesiology  Postprocedure Note    Patient: Joseph Wellington  MRN: [id number]  YOB: 1950  Date of evaluation: 4/3/2019  Time:  10:24 AM     Procedure Summary     Date:  04/03/19 Room / Location:  [facility] 04 / [facility] ENDOSCOPY    Anesthesia Start:  0827 Anesthesia Stop:  6694    Procedure:  ESOPHAGOGASTRODUODENOSCOPY WITH BANDING (N/A ) Diagnosis:  (ESOPHAGEAL VARICES)    Surgeon:  Wen Cleveland MD Responsible Provider:  Kody Camacho MD    Anesthesia Type:  TIVA ASA Status:  3          Anesthesia Type: TIVA    Queenie Phase I: Queenie Score: 9    Queenie Phase II: Queenie Score: 10    Last vitals: Reviewed and per EMR flowsheets.        Anesthesia Post Evaluation    Patient location during evaluation: PACU  Patient participation: complete - patient participated  Level of consciousness: awake and alert  Pain score: 0  Airway patency: patent  Nausea & Vomiting: no nausea and no vomiting  Complications: no  Cardiovascular status: blood pressure returned to baseline  Respiratory status: acceptable  Hydration status: euvolemic

## 2019-04-03 NOTE — PROGRESS NOTES
Pt alert, occasional congested cough, states discomfort level 4, declines medication, vss on ra abd soft

## 2019-04-03 NOTE — BRIEF OP NOTE
Brief Postoperative Note    Fela De Leon  YOB: 1950  [id number]    Pre-operative Diagnosis: Cirrhosis; esophageal varices    Post-operative Diagnosis: Same    Procedure: EGD    Anesthesia: MAC    Surgeons/Assistants: Terrie Rodgers MD    Estimated Blood Loss: None    Complications: None    Specimens: Was Not Obtained    Findings: See dictated report    Electronically signed by Terrie Rodgers MD on 4/3/2019 at 8:53 AM

## 2019-04-03 NOTE — H&P
[facility]   Pre-operative History and Physical    Patient: Vinny Michel  : 1950  Acct#:         HISTORY OF PRESENT ILLNESS:    The patient is a 77 y.o. male  who presents with cirrhosis; esophageal varices  Past Medical History:        Diagnosis Date    Arthritis     CAD (coronary artery disease)     Cyst of joint of hand     right hand ganglion cyst    Hypertension     Type II or unspecified type diabetes mellitus without mention of complication, not stated as uncontrolled     Wears glasses      Past Surgical History:        Procedure Laterality Date    BACK SURGERY  2016    L3, L4,L5 remove spurs    COLONOSCOPY  2018    wiht polypectomy x3    COLONOSCOPY N/A 2018    COLONOSCOPY POLYPECTOMY SNARE/COLD BIOPSY performed by Julio Galleogs MD at [facility]  12/04/2018    x2 stents-70% and 85% blocked    CYST REMOVAL      on arm    INGUINAL HERNIA REPAIR Left 12/11/15    laparoscopic    UPPER GASTROINTESTINAL ENDOSCOPY  2018    with biopsy    UPPER GASTROINTESTINAL ENDOSCOPY N/A 2018    EGD BIOPSY performed by Julio Gallegos MD at [address] [place] [address] N/A 3/6/2019    EGD BAND LIGATION performed by Eunice Redmond MD at [address] [place] [address]     Medications prior to admission:   Prior to Admission medications    Medication Sig Start Date End Date Taking?  Authorizing Provider   clopidogrel (PLAVIX) 75 MG tablet Take 1 tablet by mouth daily 19  Yes PATRICK Nayak CNP   atorvastatin (LIPITOR) 40 MG tablet Take 1 tablet by mouth nightly 19  Yes PATRICK Nayak CNP   aspirin 325 MG tablet Take 325 mg by mouth daily   Yes Historical Provider, MD   glipiZIDE (GLUCOTROL) 10 MG tablet Take 10 mg by mouth 2 times daily (before meals)   Yes Historical Provider, MD   metFORMIN (GLUCOPHAGE) 1000 MG tablet Take 1 tablet by mouth 2 times daily (with meals) 18 Yes Madison Rubio MD   omeprazole (PRILOSEC) 40 MG delayed release capsule Take 1 capsule by mouth daily 12/6/18  Yes Madsion Rubio MD   nadolol (CORGARD) 80 MG tablet Take 1 tablet by mouth daily  Patient taking differently: Take 40 mg by mouth daily 40 mg daily 12/6/18  Yes Madison Rubio MD   ferrous sulfate 325 (65 Fe) MG tablet Take 325 mg by mouth 2 times daily    Yes Historical Provider, MD   losartan-hydrochlorothiazide (HYZAAR) 100-25 MG per tablet Take 1 tablet by mouth daily 9/6/17  Yes Lisbet Granado MD   Multiple Vitamins-Minerals (MULTIVITAMIN PO) Take 1 tablet by mouth daily   Yes Historical Provider, MD   CINNAMON PO Take 1,000 mg by mouth 2 times daily   Yes Historical Provider, MD   glucose blood VI test strips (FREESTYLE LITE) strip Test blood sugar once daily. 10/6/14   Lisbet Granado MD     Allergies:    No known allergies  Social History:   Social History     Socioeconomic History    Marital status:      Spouse name: Joaquin Jefferson Number of children: Not on file    Years of education: 12    Highest education level: Not on file   Occupational History    Not on file   Social Needs    Financial resource strain: Not on file    Food insecurity:     Worry: Not on file     Inability: Not on file    Transportation needs:     Medical: Not on file     Non-medical: Not on file   Tobacco Use    Smoking status: Never Smoker    Smokeless tobacco: Never Used   Substance and Sexual Activity    Alcohol use:  Yes     Alcohol/week: 0.0 oz     Comment: rare    Drug use: No    Sexual activity: Yes     Partners: Female   Lifestyle    Physical activity:     Days per week: Not on file     Minutes per session: Not on file    Stress: Not on file   Relationships    Social connections:     Talks on phone: Not on file     Gets together: Not on file     Attends Sabianist service: Not on file     Active member of club or organization: Not on file     Attends meetings of clubs or

## 2019-04-22 ENCOUNTER — OFFICE VISIT (OUTPATIENT)
Dept: CARDIOLOGY CLINIC | Age: 69
End: 2019-04-22
Payer: MEDICARE

## 2019-04-22 VITALS
OXYGEN SATURATION: 99 % | HEIGHT: 74 IN | SYSTOLIC BLOOD PRESSURE: 136 MMHG | BODY MASS INDEX: 32.18 KG/M2 | DIASTOLIC BLOOD PRESSURE: 64 MMHG | WEIGHT: 250.7 LBS | HEART RATE: 70 BPM

## 2019-04-22 DIAGNOSIS — I10 ESSENTIAL HYPERTENSION: ICD-10-CM

## 2019-04-22 DIAGNOSIS — E78.5 HYPERLIPIDEMIA, UNSPECIFIED HYPERLIPIDEMIA TYPE: ICD-10-CM

## 2019-04-22 DIAGNOSIS — I25.10 CORONARY ARTERY DISEASE INVOLVING NATIVE CORONARY ARTERY OF NATIVE HEART WITHOUT ANGINA PECTORIS: Primary | ICD-10-CM

## 2019-04-22 PROCEDURE — 4040F PNEUMOC VAC/ADMIN/RCVD: CPT | Performed by: INTERNAL MEDICINE

## 2019-04-22 PROCEDURE — 3017F COLORECTAL CA SCREEN DOC REV: CPT | Performed by: INTERNAL MEDICINE

## 2019-04-22 PROCEDURE — 1123F ACP DISCUSS/DSCN MKR DOCD: CPT | Performed by: INTERNAL MEDICINE

## 2019-04-22 PROCEDURE — G8598 ASA/ANTIPLAT THER USED: HCPCS | Performed by: INTERNAL MEDICINE

## 2019-04-22 PROCEDURE — G8427 DOCREV CUR MEDS BY ELIG CLIN: HCPCS | Performed by: INTERNAL MEDICINE

## 2019-04-22 PROCEDURE — 99214 OFFICE O/P EST MOD 30 MIN: CPT | Performed by: INTERNAL MEDICINE

## 2019-04-22 PROCEDURE — G8417 CALC BMI ABV UP PARAM F/U: HCPCS | Performed by: INTERNAL MEDICINE

## 2019-04-22 PROCEDURE — 1036F TOBACCO NON-USER: CPT | Performed by: INTERNAL MEDICINE

## 2019-04-22 NOTE — PROGRESS NOTES
[facility]   Cardiac Follow up     Referring Provider:  Tim Montiel     Chief Complaint   Patient presents with    Coronary Artery Disease    Hypertension        History of Present Illness:  Lisa Simmons is here today for regular follow up. He has  a history of NSTEMI / CAD s/p PTCA RCA (12/2018), hypertension, and hyperlipidemia. His other history includes: JOSE LUIS, esophageal varices, mild portal hypertensive gastropathy / portal HTN with cirrhosis and ITP. Denies any complaints. He recently had varices banded in 4/3/19. He said he had 8 banded and will return in June for follow up. Prior to his NSTEMI he has low energy and wasn't able to exercise. He states he now feels much better and has walked 3 miles today. Denies exertional chest pain, SOUZA/PND, palpitations, light-headedness, edema. Past Medical History:   has a past medical history of Arthritis, CAD (coronary artery disease), Cyst of joint of hand, Hypertension, Type II or unspecified type diabetes mellitus without mention of complication, not stated as uncontrolled, and Wears glasses. Surgical History:   has a past surgical history that includes cyst removal; Inguinal hernia repair (Left, 12/11/15); Colonoscopy (12/04/2018); Upper gastrointestinal endoscopy (12/04/2018); Upper gastrointestinal endoscopy (N/A, 12/4/2018); Colonoscopy (N/A, 12/4/2018); Coronary angioplasty with stent (12/04/2018); Upper gastrointestinal endoscopy (N/A, 3/6/2019); back surgery (11/25/2016); and Upper gastrointestinal endoscopy (N/A, 4/3/2019). Social History:   reports that he has never smoked. He has never used smokeless tobacco. He reports that he drinks alcohol. He reports that he does not use drugs. Family History:  family history includes Arthritis in his brother; Diabetes in his father; Heart Disease in his father; Kidney Disease in his mother.      Home Medications:  Prior to Admission medications    Medication Sig Start Date End Date Taking? Authorizing Provider   aspirin 81 MG tablet Take 1 tablet by mouth daily 4/22/19  Yes Margie Rossi MD   clopidogrel (PLAVIX) 75 MG tablet Take 1 tablet by mouth daily 2/11/19   PATRICK Rojas CNP   atorvastatin (LIPITOR) 40 MG tablet Take 1 tablet by mouth nightly 1/30/19   PATRICK Rojas CNP   glipiZIDE (GLUCOTROL) 10 MG tablet Take 10 mg by mouth 2 times daily (before meals)    Historical Provider, MD   metFORMIN (GLUCOPHAGE) 1000 MG tablet Take 1 tablet by mouth 2 times daily (with meals) 12/7/18   Lex Cooks, MD   omeprazole (PRILOSEC) 40 MG delayed release capsule Take 1 capsule by mouth daily 12/6/18   Lex Cooks, MD   nadolol (CORGARD) 80 MG tablet Take 1 tablet by mouth daily  Patient taking differently: Take 40 mg by mouth daily 40 mg daily 12/6/18   Lex Cooks, MD   ferrous sulfate 325 (65 Fe) MG tablet Take 325 mg by mouth 2 times daily     Historical Provider, MD   losartan-hydrochlorothiazide (HYZAAR) 100-25 MG per tablet Take 1 tablet by mouth daily 9/6/17   Curt Villanueva MD   Multiple Vitamins-Minerals (MULTIVITAMIN PO) Take 1 tablet by mouth daily    Historical Provider, MD   CINNAMON PO Take 1,000 mg by mouth 2 times daily    Historical Provider, MD   glucose blood VI test strips (FREESTYLE LITE) strip Test blood sugar once daily. 10/6/14   Curt Villanueva MD        Allergies:  No known allergies     Review of Systems:   · Constitutional: there has been no unanticipated weight loss. There's been no change in energy level, sleep pattern, or activity level. · Eyes: No visual changes or diplopia. No scleral icterus. · ENT: No Headaches, hearing loss or vertigo. No mouth sores or sore throat. · Cardiovascular: Reviewed in HPI  · Respiratory: No cough or wheezing, no sputum production. No hematemesis. · Gastrointestinal: No abdominal pain, appetite loss, blood in stools.  No change in bowel or bladder habits. · Genitourinary: No dysuria, trouble voiding, or hematuria. · Musculoskeletal:  No gait disturbance, weakness or joint complaints. · Integumentary: No rash or pruritis. · Neurological: No headache, diplopia, change in muscle strength, numbness or tingling. No change in gait, balance, coordination, mood, affect, memory, mentation, behavior. · Psychiatric: No anxiety, no depression. · Endocrine: No malaise, fatigue or temperature intolerance. No excessive thirst, fluid intake, or urination. No tremor. · Hematologic/Lymphatic: No abnormal bruising or bleeding, blood clots or swollen lymph nodes. · Allergic/Immunologic: No nasal congestion or hives. Physical Examination:    Vitals:    04/22/19 1603   BP: 136/64   Pulse: 70   SpO2: 99%        Wt Readings from Last 1 Encounters:   04/22/19 250 lb 11.2 oz (113.7 kg)       Constitutional and General Appearance: NAD  Skin:good turgor,intact without lesions  HEENT: EOMI ,normal  Neck:no JVD    Respiratory:  · Normal excursion and expansion without use of accessory muscles  · Resp Auscultation: Normal breath sounds without dullness  Cardiovascular:  · The apical impulses not displaced  · Heart tones are crisp and normal  · Cervical veins are not engorged  · The carotid upstroke is normal in amplitude and contour without delay or bruit  · Peripheral pulses are symmetrical and full  · There is no clubbing, cyanosis of the extremities. · No edema  · Femoral Arteries: 2+ and equal  · Pedal Pulses: 2+ and equal   Abdomen:  · No masses or tenderness  · Liver/Spleen: No Abnormalities Noted  Neurological/Psychiatric:  · Alert and oriented in all spheres  · Moves all extremities well  · Exhibits normal gait balance and coordination  · No abnormalities of mood, affect, memory, mentation, or behavior are noted    Nuclear myoview 11/30/18:   Summary    There is a moderate sized fixed inferior defect suggestive of scar.    There is mild inferior hypokinesis with EF=50%.  High risk treadmill with Guillermo treadmill score of -15.        Recommendation    Pt sent to ER for admission.       ECG Findings    Abnormal ECG portion of stress test with 2 mm ST    horizontal depression with stress consistent with    ischemia.         Last Angiogram: 12/5/18:   Patient with markedly abnormal stress with inferior ischemia. Cath done with 75% prox RCA,85% distal RCA. Now post GI w/up with varices identified. GI service cleared him for dual antiplatelet therapy ,now post transfusion     PCI with VOLODYMYR x 2 in prox and distal RCA. He will need dual antiplatelet therapy,prefer for 6 months but at least 1 month    Assessment:    1. Coronary artery disease involving native coronary artery of native heart without angina pectoris  hx NSTEMI : metoprolol changed to nadolol (cirrhosis & varices)  stable : s/p PTCA prox & distal RCA Dec '18    2. Essential hypertension   controlled  Blood pressure 136/64, pulse 70, height 6' 2\" (1.88 m), weight 250 lb 11.2 oz (113.7 kg), SpO2 99 %. 3. Mixed hyperlipidemia    HDL near goal ; otherwise well controlled  12/1/18> , LDL 85, HDL 30, TG 99          Plan:    Beverly Galindo has a stable cardiac status. 1.Change to Asprin 81 mg  2. Stop Plavix in June. 3. Will continue with risk factor modifications. 4. Return for regular follow up in 6 months with stress echo . I appreciate the opportunity of cooperating in the care of this individual.    Michelle Chery. Bishnu Briseno M.D., [facility]    The scribe's documentation has been prepared under my direction and personally reviewed by me in its entirety. I confirm that the note above accurately reflects all work, treatment, procedures, and medical decision making performed by me.

## 2019-04-22 NOTE — PATIENT INSTRUCTIONS
1.Change to Asprin 81 mg  2. Stop Plavix in June. 3. Will continue with risk factor modifications. 4. Return for regular follow up in 6 months with stress echo .

## 2019-06-04 ENCOUNTER — ANESTHESIA EVENT (OUTPATIENT)
Dept: ENDOSCOPY | Age: 69
End: 2019-06-04
Payer: MEDICARE

## 2019-06-04 RX ORDER — OMEPRAZOLE 20 MG/1
40 CAPSULE, DELAYED RELEASE ORAL DAILY
COMMUNITY
End: 2020-08-28 | Stop reason: ALTCHOICE

## 2019-06-04 RX ORDER — METHOCARBAMOL 750 MG/1
750 TABLET, FILM COATED ORAL EVERY 8 HOURS PRN
COMMUNITY
Start: 2019-04-01

## 2019-06-05 ENCOUNTER — ANESTHESIA (OUTPATIENT)
Dept: ENDOSCOPY | Age: 69
End: 2019-06-05
Payer: MEDICARE

## 2019-06-05 ENCOUNTER — HOSPITAL ENCOUNTER (OUTPATIENT)
Age: 69
Setting detail: OUTPATIENT SURGERY
Discharge: HOME | End: 2019-06-05
Attending: INTERNAL MEDICINE | Admitting: INTERNAL MEDICINE
Payer: MEDICARE

## 2019-06-05 VITALS
OXYGEN SATURATION: 99 % | RESPIRATION RATE: 29 BRPM | DIASTOLIC BLOOD PRESSURE: 74 MMHG | SYSTOLIC BLOOD PRESSURE: 162 MMHG

## 2019-06-05 VITALS
TEMPERATURE: 98 F | HEIGHT: 74 IN | OXYGEN SATURATION: 98 % | BODY MASS INDEX: 30.63 KG/M2 | DIASTOLIC BLOOD PRESSURE: 74 MMHG | RESPIRATION RATE: 14 BRPM | SYSTOLIC BLOOD PRESSURE: 124 MMHG | WEIGHT: 238.65 LBS | HEART RATE: 60 BPM

## 2019-06-05 LAB
GLUCOSE BLD-MCNC: 115 MG/DL (ref 70–99)
PERFORMED ON: ABNORMAL

## 2019-06-05 PROCEDURE — 6360000002 HC RX W HCPCS: Performed by: NURSE ANESTHETIST, CERTIFIED REGISTERED

## 2019-06-05 PROCEDURE — 3700000000 HC ANESTHESIA ATTENDED CARE: Performed by: INTERNAL MEDICINE

## 2019-06-05 PROCEDURE — 7100000010 HC PHASE II RECOVERY - FIRST 15 MIN: Performed by: INTERNAL MEDICINE

## 2019-06-05 PROCEDURE — 2580000003 HC RX 258: Performed by: ANESTHESIOLOGY

## 2019-06-05 PROCEDURE — 7100000000 HC PACU RECOVERY - FIRST 15 MIN: Performed by: INTERNAL MEDICINE

## 2019-06-05 PROCEDURE — 2500000003 HC RX 250 WO HCPCS: Performed by: NURSE ANESTHETIST, CERTIFIED REGISTERED

## 2019-06-05 PROCEDURE — 7100000001 HC PACU RECOVERY - ADDTL 15 MIN: Performed by: INTERNAL MEDICINE

## 2019-06-05 PROCEDURE — 7100000011 HC PHASE II RECOVERY - ADDTL 15 MIN: Performed by: INTERNAL MEDICINE

## 2019-06-05 PROCEDURE — 3609017100 HC EGD: Performed by: INTERNAL MEDICINE

## 2019-06-05 PROCEDURE — 2709999900 HC NON-CHARGEABLE SUPPLY: Performed by: INTERNAL MEDICINE

## 2019-06-05 RX ORDER — OXYCODONE HYDROCHLORIDE AND ACETAMINOPHEN 5; 325 MG/1; MG/1
2 TABLET ORAL PRN
Status: DISCONTINUED | OUTPATIENT
Start: 2019-06-05 | End: 2019-06-05 | Stop reason: HOSPADM

## 2019-06-05 RX ORDER — SODIUM CHLORIDE 0.9 % (FLUSH) 0.9 %
10 SYRINGE (ML) INJECTION PRN
Status: DISCONTINUED | OUTPATIENT
Start: 2019-06-05 | End: 2019-06-05 | Stop reason: HOSPADM

## 2019-06-05 RX ORDER — MORPHINE SULFATE 2 MG/ML
1 INJECTION, SOLUTION INTRAMUSCULAR; INTRAVENOUS EVERY 5 MIN PRN
Status: DISCONTINUED | OUTPATIENT
Start: 2019-06-05 | End: 2019-06-05 | Stop reason: HOSPADM

## 2019-06-05 RX ORDER — FENTANYL CITRATE 50 UG/ML
50 INJECTION, SOLUTION INTRAMUSCULAR; INTRAVENOUS EVERY 5 MIN PRN
Status: DISCONTINUED | OUTPATIENT
Start: 2019-06-05 | End: 2019-06-05 | Stop reason: HOSPADM

## 2019-06-05 RX ORDER — MORPHINE SULFATE 2 MG/ML
2 INJECTION, SOLUTION INTRAMUSCULAR; INTRAVENOUS EVERY 5 MIN PRN
Status: DISCONTINUED | OUTPATIENT
Start: 2019-06-05 | End: 2019-06-05 | Stop reason: HOSPADM

## 2019-06-05 RX ORDER — MEPERIDINE HYDROCHLORIDE 25 MG/ML
12.5 INJECTION INTRAMUSCULAR; INTRAVENOUS; SUBCUTANEOUS EVERY 5 MIN PRN
Status: DISCONTINUED | OUTPATIENT
Start: 2019-06-05 | End: 2019-06-05 | Stop reason: HOSPADM

## 2019-06-05 RX ORDER — SODIUM CHLORIDE 0.9 % (FLUSH) 0.9 %
10 SYRINGE (ML) INJECTION EVERY 12 HOURS SCHEDULED
Status: DISCONTINUED | OUTPATIENT
Start: 2019-06-05 | End: 2019-06-05 | Stop reason: HOSPADM

## 2019-06-05 RX ORDER — PROPOFOL 10 MG/ML
INJECTION, EMULSION INTRAVENOUS PRN
Status: DISCONTINUED | OUTPATIENT
Start: 2019-06-05 | End: 2019-06-05 | Stop reason: SDUPTHER

## 2019-06-05 RX ORDER — ONDANSETRON 2 MG/ML
4 INJECTION INTRAMUSCULAR; INTRAVENOUS
Status: DISCONTINUED | OUTPATIENT
Start: 2019-06-05 | End: 2019-06-05 | Stop reason: HOSPADM

## 2019-06-05 RX ORDER — OXYCODONE HYDROCHLORIDE AND ACETAMINOPHEN 5; 325 MG/1; MG/1
1 TABLET ORAL PRN
Status: DISCONTINUED | OUTPATIENT
Start: 2019-06-05 | End: 2019-06-05 | Stop reason: HOSPADM

## 2019-06-05 RX ORDER — LIDOCAINE HYDROCHLORIDE 20 MG/ML
INJECTION, SOLUTION EPIDURAL; INFILTRATION; INTRACAUDAL; PERINEURAL PRN
Status: DISCONTINUED | OUTPATIENT
Start: 2019-06-05 | End: 2019-06-05 | Stop reason: SDUPTHER

## 2019-06-05 RX ORDER — SODIUM CHLORIDE 9 MG/ML
INJECTION, SOLUTION INTRAVENOUS CONTINUOUS
Status: DISCONTINUED | OUTPATIENT
Start: 2019-06-05 | End: 2019-06-05 | Stop reason: HOSPADM

## 2019-06-05 RX ORDER — FENTANYL CITRATE 50 UG/ML
25 INJECTION, SOLUTION INTRAMUSCULAR; INTRAVENOUS EVERY 5 MIN PRN
Status: DISCONTINUED | OUTPATIENT
Start: 2019-06-05 | End: 2019-06-05 | Stop reason: HOSPADM

## 2019-06-05 RX ADMIN — PROPOFOL 100 MG: 10 INJECTION, EMULSION INTRAVENOUS at 08:22

## 2019-06-05 RX ADMIN — PROPOFOL 110 MG: 10 INJECTION, EMULSION INTRAVENOUS at 08:24

## 2019-06-05 RX ADMIN — SODIUM CHLORIDE: 9 INJECTION, SOLUTION INTRAVENOUS at 08:16

## 2019-06-05 RX ADMIN — LIDOCAINE HYDROCHLORIDE 100 MG: 20 INJECTION, SOLUTION EPIDURAL; INFILTRATION; INTRACAUDAL; PERINEURAL at 08:22

## 2019-06-05 ASSESSMENT — PAIN SCALES - GENERAL
PAINLEVEL_OUTOF10: 0

## 2019-06-05 ASSESSMENT — LIFESTYLE VARIABLES: SMOKING_STATUS: 0

## 2019-06-05 ASSESSMENT — PULMONARY FUNCTION TESTS
PIF_VALUE: 0
PIF_VALUE: 1
PIF_VALUE: 0

## 2019-06-05 NOTE — BRIEF OP NOTE
Brief Postoperative Note    Gris Ruiz  YOB: 1950  [id number]    Pre-operative Diagnosis: Esophageal varices    Post-operative Diagnosis: Same    Procedure: EGD    Anesthesia: MAC    Surgeons/Assistants: Willo Bosworth, MD    Estimated Blood Loss: None    Complications: None    Specimens: Was Not Obtained    Findings: See dictated report    Electronically signed by Willo Bosworth, MD on 6/5/2019 at 8:31 AM

## 2019-06-05 NOTE — PROGRESS NOTES
C-Difficile admission screening and protocol:     * Admitted with diarrhea? YES____    NO___X__     *Prior history of C-Diff. In last 3 months? YES____   NO_X____     *Antibiotic use in the past 6-8 weeks? NO____X__YES______                 If yes which  ANTIBIOTIC AND REASON______     *Prior hospitalization or nursing home in the last month?  YES____   NO__X__
Ready for Phase 2.
Sedation provided per anesthesia. See anesthesia record for medication administration and vitals.
toes      For your safety, please do not wear any jewelry or body piercing's on the day of surgery. All jewelry must be removed. If you have dentures, they will be removed before going to operating room. For your convenience, we will provide you with a container. If you wear contact lenses or glasses, they will be removed, please bring a case for them. If you have a living will and a durable power of  for healthcare, please bring in a copy. As part of our patient safety program to minimize surgical site infections, we ask you to do the following:    · Please notify your surgeon if you develop any illness between         now and the  day of your surgery. · This includes a cough, cold, fever, sore throat, nausea,         or vomiting, and diarrhea, etc.  ·  Please notify your surgeon if you experience dizziness, shortness         of breath or blurred vision between now and the time of your surgery. Do not shave your operative site 96 hours prior to surgery. For face and neck surgery, men may use an electric razor 48 hours   prior to surgery. You may shower the night before surgery or the morning of   your surgery with an antibacterial soap. You will need to bring a photo ID and insurance card    [facility] has an onsite pharmacy, would you like to utilize our pharmacy     If you will be staying overnight and use a C-pap machine, please bring   your C-pap to hospital     Our goal is to provide you with excellent care, therefore, visitors will be limited to two(2) in the room at a time so that we may focus on providing this care for you. Please contact pre-admission testing if you have any further questions. [facility] phone number:  [phone] PAT fax number:  [phone]  Please note these are generalized instructions for all surgical cases, you may be provided with more specific instructions according to your surgery.

## 2019-06-05 NOTE — H&P
[facility]   Pre-operative History and Physical    Patient: Mary Jo Esparza  : 1950  Acct#:         HISTORY OF PRESENT ILLNESS:    The patient is a 68 y.o. male  who presents with esophageal varices  Past Medical History:        Diagnosis Date    Arthritis     CAD (coronary artery disease)     Dupuytren's contracture syndrome     History of blood transfusion     Hypertension     Type II or unspecified type diabetes mellitus without mention of complication, not stated as uncontrolled     type 2    Wears glasses      Past Surgical History:        Procedure Laterality Date    BACK SURGERY  2016    L3, L4,L5 remove spurs    COLONOSCOPY  2018    wiht polypectomy x3    COLONOSCOPY N/A 2018    COLONOSCOPY POLYPECTOMY SNARE/COLD BIOPSY performed by Melissa Weaver MD at [facility]  12/04/2018    x2 stents-70% and 85% blocked    CYST REMOVAL      on arm    INGUINAL HERNIA REPAIR Left 12/11/15    laparoscopic    UPPER GASTROINTESTINAL ENDOSCOPY  2018    with biopsy    UPPER GASTROINTESTINAL ENDOSCOPY N/A 2018    EGD BIOPSY performed by Melissa Weaver MD at [address] [place] [address] N/A 3/6/2019    EGD BAND LIGATION [facility] N/A 4/3/2019    ESOPHAGOGASTRODUODENOSCOPY WITH BANDING performed by Hermann Gonzáles MD at [facility]     Medications prior to admission:   Prior to Admission medications    Medication Sig Start Date End Date Taking?  Authorizing Provider   methocarbamol (ROBAXIN) 750 MG tablet Take 750 mg by mouth every 8 hours as needed 19  Yes Historical Provider, MD   omeprazole (PRILOSEC) 20 MG delayed release capsule Take 40 mg by mouth Daily   Yes Historical Provider, MD   aspirin 81 MG tablet Take 1 tablet by mouth daily 19  Yes Maria L Loo MD   atorvastatin (LIPITOR) 40 MG tablet  Stress: Not on file   Relationships    Social connections:     Talks on phone: Not on file     Gets together: Not on file     Attends Voodoo service: Not on file     Active member of club or organization: Not on file     Attends meetings of clubs or organizations: Not on file     Relationship status: Not on file    Intimate partner violence:     Fear of current or ex partner: Not on file     Emotionally abused: Not on file     Physically abused: Not on file     Forced sexual activity: Not on file   Other Topics Concern    Not on file   Social History Narrative    Not on file           Family History:   Family History   Problem Relation Age of Onset    Kidney Disease Mother     Heart Disease Father     Diabetes Father     Arthritis Brother          PHYSICAL EXAM:      /73   Pulse 63   Resp 20   Ht 6' 2\" (1.88 m)   Wt 238 lb 10.4 oz (108.3 kg)   SpO2 99%   BMI 30.64 kg/m²  I        Heart: Normal    Lungs: Normal    Abdomen: Normal      ASA Grade: ASA 3 - Patient with moderate systemic disease with functional limitations    II (soft palate, uvula, fauces visible)  ASSESSMENT AND PLAN:    1. Patient is a 68 y.o. male here for EGD  2. Procedure options, risks and benefits reviewed with patient who expresses understanding.

## 2019-06-05 NOTE — PROCEDURES
[address]                                 PROCEDURE NOTE    PATIENT NAME: Scottie John                   :        1950  MED REC NO:   [id number]                          ROOM:  ACCOUNT NO:   [de-identified]                           ADMIT DATE: 2019  PROVIDER:     Boni Naik MD    EGD    DATE OF PROCEDURE:  2019    REFERRING PROVIDER:  Francisca Lyon MD    PATIENT HISTORY:  This is a 38-year-old male, outpatient. INSTRUMENT USED:  Olympus GIF-Q180. MEDICATIONS OF PROCEDURE:  The patient was premedicated with Diprivan  intravenously as administered by the anesthesiology service. INDICATIONS:  The patient has a history of cirrhosis on the basis of  nonalcoholic steatohepatitis. He has known esophageal varices and has  undergone two recent banding sessions. He presents now for  re-examination to determine if additional banding will be required. PROCEDURE:  The endoscope was inserted into the esophagus without  difficulty. The Z-line was located at 44 cm. There was essentially  complete elimination of the prominent distal esophageal varices. There  were prominent sam-aa-fwelryin esophageal varices, but these had been  noted previously and these are not routinely banded. In the stomach,  there were no gastric varices. There were very mild changes of portal  hypertensive gastropathy. The previously identified nodular antral  gastritis had healed. The duodenal bulb and descending duodenum were  normal.    IMPRESSION:  1. No persistence of distal esophageal varices. 2.  Proximal and mid esophageal varices still present. 3.  No evidence of gastric varices. 4.  Mild portal hypertensive gastropathy. 5.  Healed nodular antral gastritis. PLAN:  I will repeat an EGD in six months to see if additional banding  may be required.         Bandar James MD    D: 2019 8:47:21       T: 06/05/2019 10:09:24     [id number]  Job#: [id number]     Doc#: [id number]    CC:  Michael Chapman MD

## 2019-06-05 NOTE — ANESTHESIA PRE PROCEDURE
Department of Anesthesiology  Preprocedure Note       Name:  iAsha Wilcox   Age:  68 y.o.  :  1950                                          MRN:  [id number]         Date:  2019      Surgeon: Lieutenant Infante):  Darin Us MD    Procedure: ESOPHAGOGASTRODUODENOSCOPY (N/A )    Medications prior to admission:   Prior to Admission medications    Medication Sig Start Date End Date Taking? Authorizing Provider   methocarbamol (ROBAXIN) 750 MG tablet Take 750 mg by mouth every 8 hours as needed 19  Yes Historical Provider, MD   omeprazole (PRILOSEC) 20 MG delayed release capsule Take 40 mg by mouth Daily   Yes Historical Provider, MD   aspirin 81 MG tablet Take 1 tablet by mouth daily 19  Yes Gerri Pallas, MD   atorvastatin (LIPITOR) 40 MG tablet Take 1 tablet by mouth nightly 19  Yes PATRICK Aguilar - CNP   glipiZIDE (GLUCOTROL) 10 MG tablet Take 10 mg by mouth 2 times daily (before meals)   Yes Historical Provider, MD   metFORMIN (GLUCOPHAGE) 1000 MG tablet Take 1 tablet by mouth 2 times daily (with meals) 18  Yes Gil Garzon MD   nadolol (CORGARD) 80 MG tablet Take 1 tablet by mouth daily  Patient taking differently: Take 40 mg by mouth daily 40 mg daily 18  Yes Gil Garzon MD   ferrous sulfate 325 (65 Fe) MG tablet Take 325 mg by mouth 2 times daily    Yes Historical Provider, MD   losartan-hydrochlorothiazide (HYZAAR) 100-25 MG per tablet Take 1 tablet by mouth daily  Patient taking differently: Take 1 tablet by mouth every evening  17  Yes Ray Richey MD   Multiple Vitamins-Minerals (MULTIVITAMIN PO) Take 1 tablet by mouth daily   Yes Historical Provider, MD   CINNAMON PO Take 1,000 mg by mouth 2 times daily   Yes Historical Provider, MD   glucose blood VI test strips (FREESTYLE LITE) strip Test blood sugar once daily.  10/6/14   Ray Richey MD       Current medications:    Current Facility-Administered Medications   Medication Dose Route Frequency Provider Last Rate Last Dose    0.9 % sodium chloride infusion   Intravenous Continuous Dasia Duff MD        sodium chloride flush 0.9 % injection 10 mL  10 mL Intravenous 2 times per day Dasia Duff MD        sodium chloride flush 0.9 % injection 10 mL  10 mL Intravenous PRN Dasia Duff MD           Allergies:     Allergies   Allergen Reactions    No Known Allergies        Problem List:    Patient Active Problem List   Diagnosis Code    Hypertension I10    Cyst of joint of hand M25.849    High triglycerides E78.1    Dupuytren's contracture of right hand M72.0    Rosacea L71.9    Thrombocytopenia (HCC) D69.6    Chronic ITP (idiopathic thrombocytopenia) ([facility]) D69.3    Iron deficiency anemia D50.9    Anemia, iron deficiency D50.9    Chest pain R07.9    Non-STEMI (non-ST elevated myocardial infarction) ([facility]) I21.4    CAD (coronary artery disease) I25.10    Hyperlipidemia E78.5       Past Medical History:        Diagnosis Date    Arthritis     CAD (coronary artery disease)     Dupuytren's contracture syndrome     History of blood transfusion 2018    Hypertension     Type II or unspecified type diabetes mellitus without mention of complication, not stated as uncontrolled     type 2    Wears glasses        Past Surgical History:        Procedure Laterality Date    BACK SURGERY  11/25/2016    L3, L4,L5 remove spurs    COLONOSCOPY  12/04/2018    wiht polypectomy x3    COLONOSCOPY N/A 12/4/2018    COLONOSCOPY POLYPECTOMY SNARE/COLD BIOPSY performed by Desi Rachel MD at [facility]  12/04/2018    x2 stents-70% and 85% blocked    CYST REMOVAL      on arm    INGUINAL HERNIA REPAIR Left 12/11/15    laparoscopic    UPPER GASTROINTESTINAL ENDOSCOPY  12/04/2018    with biopsy    UPPER GASTROINTESTINAL ENDOSCOPY N/A 12/4/2018    EGD BIOPSY performed by Desi Rachel MD at [address] [place] [address] N/A 3/6/2019    EGD BAND LIGATION performed by Brice Snyder MD at [facility] N/A 4/3/2019    ESOPHAGOGASTRODUODENOSCOPY WITH BANDING performed by Brice Snyder MD at [facility] History:    Social History     Tobacco Use    Smoking status: Never Smoker    Smokeless tobacco: Never Used   Substance Use Topics    Alcohol use: Not Currently     Alcohol/week: 0.0 oz     Comment: quit drinking Dec 4 2018                                Counseling given: Not Answered      Vital Signs (Current):   Vitals:    06/04/19 1203 06/05/19 0719   BP:  135/73   Pulse:  63   Resp:  20   SpO2:  99%   Weight: 239 lb (108.4 kg) 238 lb 10.4 oz (108.3 kg)   Height: 6' 2\" (1.88 m)                                               BP Readings from Last 3 Encounters:   06/05/19 135/73   04/22/19 136/64   04/03/19 (!) 150/67       NPO Status:  mn+, see mar for am meds                                                                               BMI:   Wt Readings from Last 3 Encounters:   06/05/19 238 lb 10.4 oz (108.3 kg)   04/22/19 250 lb 11.2 oz (113.7 kg)   04/03/19 242 lb 9.9 oz (110.1 kg)     Body mass index is 30.64 kg/m².     CBC:   Lab Results   Component Value Date    WBC 3.9 12/06/2018    RBC 3.56 12/06/2018    RBC 4.03 11/23/2016    HGB 9.6 12/06/2018    HCT 29.0 12/06/2018    MCV 81.2 12/06/2018    RDW 17.1 12/06/2018    PLT 88 12/06/2018       CMP:   Lab Results   Component Value Date     12/06/2018    K 3.8 12/06/2018     12/06/2018    CO2 24 12/06/2018    BUN 8 12/06/2018    CREATININE 1.0 12/06/2018    GFRAA >60 12/06/2018    GFRAA >60 03/08/2013    AGRATIO 1.2 11/30/2018    LABGLOM >60 12/06/2018    GLUCOSE 199 12/06/2018    PROT 5.0 12/05/2018    PROT 7.5 03/08/2013    CALCIUM 9.1 12/06/2018    BILITOT 0.7 12/01/2018    ALKPHOS 94 11/30/2018    AST 28 11/30/2018    ALT 29 11/30/2018       POC Tests: No results for input(s): POCGLU, POCNA, POCK, POCCL, Sky Miller, POCHCT in the last 72 hours. Coags:   Lab Results   Component Value Date    PROTIME 13.0 11/30/2018    INR 1.14 11/30/2018       HCG (If Applicable): No results found for: PREGTESTUR, PREGSERUM, HCG, HCGQUANT     ABGs:   Lab Results   Component Value Date    PHART 7.219 12/11/2015    PO2ART 414 12/11/2015    EWH3EKS 68 12/11/2015    QUY5YFT 27.8 12/11/2015    BEART 0 12/11/2015    O4SMGMFG 100 12/11/2015        Type & Screen (If Applicable):  No results found for: [facility]    Anesthesia Evaluation  Patient summary reviewed no history of anesthetic complications:   Airway: Mallampati: II  TM distance: >3 FB   Neck ROM: full  Mouth opening: > = 3 FB Dental:          Pulmonary:Negative Pulmonary ROS breath sounds clear to auscultation      (-) COPD, asthma, sleep apnea and not a current smoker          Patient did not smoke on day of surgery. Cardiovascular:    (+) hypertension:, past MI: > 6 months, CAD: obstructive, CABG/stent (2 stents):, hyperlipidemia    (-) pacemaker and dysrhythmias    ECG reviewed  Rhythm: regular  Rate: normal    Stress test reviewed       Beta Blocker:  Dose within 24 Hrs         Neuro/Psych:      (-) seizures, TIA and CVA           GI/Hepatic/Renal:   (+) GERD:, liver disease (hash): portal hypertension,      (-) no renal disease       Endo/Other:    (+) DiabetesType II DM, , blood dyscrasia (off thinners (stents), 3d): anticoagulation therapy:., no malignancy/cancer. (-) hypothyroidism, hyperthyroidism, no malignancy/cancer               Abdominal:           Vascular: negative vascular ROS. Anesthesia Plan      TIVA     ASA 3       Induction: intravenous. Anesthetic plan and risks discussed with patient and spouse. Plan discussed with CRNA.               This pre-anesthesia assessment may be used as a history and physical.    DOS STAFF ADDENDUM:    Pt seen and examined, chart reviewed (including anesthesia, drug and allergy history). No interval changes to history and physical examination. Anesthetic plan, risks, benefits, alternatives, and personnel involved discussed with patient. Patient verbalized an understanding and agrees to proceed.       Yecenia Martinez MD  June 5, 2019  7:27 AM      Yecenia Martinez MD   6/5/2019

## 2019-06-05 NOTE — ANESTHESIA POSTPROCEDURE EVALUATION
Department of Anesthesiology  Postprocedure Note    Patient: Vinny Michel  MRN: [id number]  YOB: 1950  Date of evaluation: 6/5/2019  Time:  4:17 PM     Procedure Summary     Date:  06/05/19 Room / Location:  [facility] 04 / [facility] ENDOSCOPY    Anesthesia Start:  8100 Anesthesia Stop:  8480    Procedure:  ESOPHAGOGASTRODUODENOSCOPY (N/A ) Diagnosis:  (ESOPHAGEAL VARICES, CIRRHOSIS)    Surgeon:  Eunice Redmond MD Responsible Provider:  Sincere Orosco MD    Anesthesia Type:  TIVA ASA Status:  3          Anesthesia Type: TIVA    Queenie Phase I: Queenie Score: 8    Queenie Phase II: Queenie Score: 10    Last vitals: Reviewed and per EMR flowsheets.    Vitals:    06/05/19 0847 06/05/19 0852 06/05/19 0858 06/05/19 0916   BP: (!) 124/59 (!) 126/58 128/63 124/74   Pulse: 66 64 61 60   Resp: 15 12 14 14   Temp:  97.9 °F (36.6 °C) 98 °F (36.7 °C) 98 °F (36.7 °C)   TempSrc:  Temporal Temporal Temporal   SpO2: 100% 99% 98% 98%   Weight:       Height:           Anesthesia Post Evaluation    Patient location during evaluation: bedside  Patient participation: complete - patient participated  Level of consciousness: awake and alert  Airway patency: patent  Nausea & Vomiting: no nausea  Complications: no  Cardiovascular status: hemodynamically stable  Respiratory status: acceptable  Hydration status: euvolemic

## 2019-09-30 ENCOUNTER — TELEPHONE (OUTPATIENT)
Dept: CARDIOLOGY CLINIC | Age: 69
End: 2019-09-30

## 2019-10-22 ENCOUNTER — OFFICE VISIT (OUTPATIENT)
Dept: SURGERY | Age: 69
End: 2019-10-22
Payer: MEDICARE

## 2019-10-22 VITALS
HEIGHT: 74 IN | BODY MASS INDEX: 30.29 KG/M2 | WEIGHT: 236 LBS | DIASTOLIC BLOOD PRESSURE: 60 MMHG | SYSTOLIC BLOOD PRESSURE: 122 MMHG

## 2019-10-22 DIAGNOSIS — K43.6 INCARCERATED VENTRAL HERNIA: Primary | ICD-10-CM

## 2019-10-22 PROCEDURE — G8598 ASA/ANTIPLAT THER USED: HCPCS | Performed by: SURGERY

## 2019-10-22 PROCEDURE — G8427 DOCREV CUR MEDS BY ELIG CLIN: HCPCS | Performed by: SURGERY

## 2019-10-22 PROCEDURE — 3017F COLORECTAL CA SCREEN DOC REV: CPT | Performed by: SURGERY

## 2019-10-22 PROCEDURE — 4040F PNEUMOC VAC/ADMIN/RCVD: CPT | Performed by: SURGERY

## 2019-10-22 PROCEDURE — G8417 CALC BMI ABV UP PARAM F/U: HCPCS | Performed by: SURGERY

## 2019-10-22 PROCEDURE — G8484 FLU IMMUNIZE NO ADMIN: HCPCS | Performed by: SURGERY

## 2019-10-22 PROCEDURE — 1123F ACP DISCUSS/DSCN MKR DOCD: CPT | Performed by: SURGERY

## 2019-10-22 PROCEDURE — 99213 OFFICE O/P EST LOW 20 MIN: CPT | Performed by: SURGERY

## 2019-10-22 PROCEDURE — 1036F TOBACCO NON-USER: CPT | Performed by: SURGERY

## 2019-10-22 ASSESSMENT — ENCOUNTER SYMPTOMS
RESPIRATORY NEGATIVE: 1
ALLERGIC/IMMUNOLOGIC NEGATIVE: 1
BACK PAIN: 1
GASTROINTESTINAL NEGATIVE: 1
EYES NEGATIVE: 1

## 2019-11-14 ENCOUNTER — HOSPITAL ENCOUNTER (OUTPATIENT)
Dept: NON INVASIVE DIAGNOSTICS | Age: 69
Discharge: HOME | End: 2019-11-14
Payer: MEDICARE

## 2019-11-14 ENCOUNTER — OFFICE VISIT (OUTPATIENT)
Dept: CARDIOLOGY CLINIC | Age: 69
End: 2019-11-14
Payer: MEDICARE

## 2019-11-14 VITALS
WEIGHT: 234.12 LBS | SYSTOLIC BLOOD PRESSURE: 134 MMHG | BODY MASS INDEX: 30.04 KG/M2 | HEART RATE: 76 BPM | DIASTOLIC BLOOD PRESSURE: 66 MMHG | HEIGHT: 74 IN

## 2019-11-14 DIAGNOSIS — I10 ESSENTIAL HYPERTENSION: ICD-10-CM

## 2019-11-14 DIAGNOSIS — I25.10 CORONARY ARTERY DISEASE INVOLVING NATIVE CORONARY ARTERY OF NATIVE HEART WITHOUT ANGINA PECTORIS: ICD-10-CM

## 2019-11-14 DIAGNOSIS — E78.5 HYPERLIPIDEMIA, UNSPECIFIED HYPERLIPIDEMIA TYPE: ICD-10-CM

## 2019-11-14 DIAGNOSIS — I25.10 CORONARY ARTERY DISEASE INVOLVING NATIVE CORONARY ARTERY OF NATIVE HEART WITHOUT ANGINA PECTORIS: Primary | ICD-10-CM

## 2019-11-14 LAB
LV EF: 50 %
LVEF MODALITY: NORMAL

## 2019-11-14 PROCEDURE — 93320 DOPPLER ECHO COMPLETE: CPT

## 2019-11-14 PROCEDURE — G8598 ASA/ANTIPLAT THER USED: HCPCS | Performed by: INTERNAL MEDICINE

## 2019-11-14 PROCEDURE — 93351 STRESS TTE COMPLETE: CPT

## 2019-11-14 PROCEDURE — 99214 OFFICE O/P EST MOD 30 MIN: CPT | Performed by: INTERNAL MEDICINE

## 2019-11-14 PROCEDURE — G8427 DOCREV CUR MEDS BY ELIG CLIN: HCPCS | Performed by: INTERNAL MEDICINE

## 2019-11-14 PROCEDURE — 1036F TOBACCO NON-USER: CPT | Performed by: INTERNAL MEDICINE

## 2019-11-14 PROCEDURE — G8417 CALC BMI ABV UP PARAM F/U: HCPCS | Performed by: INTERNAL MEDICINE

## 2019-11-14 PROCEDURE — 4040F PNEUMOC VAC/ADMIN/RCVD: CPT | Performed by: INTERNAL MEDICINE

## 2019-11-14 PROCEDURE — G8484 FLU IMMUNIZE NO ADMIN: HCPCS | Performed by: INTERNAL MEDICINE

## 2019-11-14 PROCEDURE — 3017F COLORECTAL CA SCREEN DOC REV: CPT | Performed by: INTERNAL MEDICINE

## 2019-11-14 PROCEDURE — 1123F ACP DISCUSS/DSCN MKR DOCD: CPT | Performed by: INTERNAL MEDICINE

## 2019-11-14 RX ORDER — CLOPIDOGREL BISULFATE 75 MG/1
75 TABLET ORAL
COMMUNITY
Start: 2019-04-30 | End: 2019-11-14

## 2019-11-18 ENCOUNTER — ANESTHESIA (OUTPATIENT)
Dept: OPERATING ROOM | Age: 69
End: 2019-11-18
Payer: MEDICARE

## 2019-11-18 ENCOUNTER — ANESTHESIA EVENT (OUTPATIENT)
Dept: OPERATING ROOM | Age: 69
End: 2019-11-18
Payer: MEDICARE

## 2019-11-18 ENCOUNTER — HOSPITAL ENCOUNTER (OUTPATIENT)
Age: 69
Setting detail: OUTPATIENT SURGERY
Discharge: HOME | End: 2019-11-18
Attending: SURGERY | Admitting: SURGERY
Payer: MEDICARE

## 2019-11-18 VITALS
SYSTOLIC BLOOD PRESSURE: 159 MMHG | WEIGHT: 237 LBS | HEART RATE: 59 BPM | RESPIRATION RATE: 20 BRPM | HEIGHT: 74 IN | TEMPERATURE: 97.3 F | DIASTOLIC BLOOD PRESSURE: 66 MMHG | OXYGEN SATURATION: 100 % | BODY MASS INDEX: 30.42 KG/M2

## 2019-11-18 VITALS
TEMPERATURE: 97 F | OXYGEN SATURATION: 100 % | RESPIRATION RATE: 10 BRPM | DIASTOLIC BLOOD PRESSURE: 72 MMHG | SYSTOLIC BLOOD PRESSURE: 153 MMHG

## 2019-11-18 DIAGNOSIS — K43.6 INCARCERATED VENTRAL HERNIA: Primary | ICD-10-CM

## 2019-11-18 LAB
ABO/RH: NORMAL
ANTIBODY SCREEN: NORMAL
GLUCOSE BLD-MCNC: 129 MG/DL (ref 70–99)
GLUCOSE BLD-MCNC: 134 MG/DL (ref 70–99)
HCT VFR BLD CALC: 36.9 % (ref 40.5–52.5)
HEMOGLOBIN: 13 G/DL (ref 13.5–17.5)
MCH RBC QN AUTO: 31.6 PG (ref 26–34)
MCHC RBC AUTO-ENTMCNC: 35.1 G/DL (ref 31–36)
MCV RBC AUTO: 90.1 FL (ref 80–100)
PDW BLD-RTO: 14.1 % (ref 12.4–15.4)
PERFORMED ON: ABNORMAL
PERFORMED ON: ABNORMAL
PLATELET # BLD: 79 K/UL (ref 135–450)
PMV BLD AUTO: 7.9 FL (ref 5–10.5)
RBC # BLD: 4.1 M/UL (ref 4.2–5.9)
WBC # BLD: 5.8 K/UL (ref 4–11)

## 2019-11-18 PROCEDURE — 7100000001 HC PACU RECOVERY - ADDTL 15 MIN: Performed by: SURGERY

## 2019-11-18 PROCEDURE — 2580000003 HC RX 258: Performed by: ANESTHESIOLOGY

## 2019-11-18 PROCEDURE — 86901 BLOOD TYPING SEROLOGIC RH(D): CPT

## 2019-11-18 PROCEDURE — 2500000003 HC RX 250 WO HCPCS: Performed by: SURGERY

## 2019-11-18 PROCEDURE — 7100000010 HC PHASE II RECOVERY - FIRST 15 MIN: Performed by: SURGERY

## 2019-11-18 PROCEDURE — 7100000000 HC PACU RECOVERY - FIRST 15 MIN: Performed by: SURGERY

## 2019-11-18 PROCEDURE — 85027 COMPLETE CBC AUTOMATED: CPT

## 2019-11-18 PROCEDURE — 88302 TISSUE EXAM BY PATHOLOGIST: CPT

## 2019-11-18 PROCEDURE — 86900 BLOOD TYPING SEROLOGIC ABO: CPT

## 2019-11-18 PROCEDURE — 49561 PR REPAIR INCISIONAL HERNIA,STRANG: CPT | Performed by: SURGERY

## 2019-11-18 PROCEDURE — 3700000001 HC ADD 15 MINUTES (ANESTHESIA): Performed by: SURGERY

## 2019-11-18 PROCEDURE — 3600000002 HC SURGERY LEVEL 2 BASE: Performed by: SURGERY

## 2019-11-18 PROCEDURE — 6360000002 HC RX W HCPCS: Performed by: NURSE ANESTHETIST, CERTIFIED REGISTERED

## 2019-11-18 PROCEDURE — 2500000003 HC RX 250 WO HCPCS: Performed by: NURSE ANESTHETIST, CERTIFIED REGISTERED

## 2019-11-18 PROCEDURE — 6370000000 HC RX 637 (ALT 250 FOR IP): Performed by: ANESTHESIOLOGY

## 2019-11-18 PROCEDURE — 2709999900 HC NON-CHARGEABLE SUPPLY: Performed by: SURGERY

## 2019-11-18 PROCEDURE — 3600000012 HC SURGERY LEVEL 2 ADDTL 15MIN: Performed by: SURGERY

## 2019-11-18 PROCEDURE — 49568 PR IMPLANT MESH HERNIA REPAIR/DEBRIDEMENT CLOSURE: CPT | Performed by: SURGERY

## 2019-11-18 PROCEDURE — 86850 RBC ANTIBODY SCREEN: CPT

## 2019-11-18 PROCEDURE — 3700000000 HC ANESTHESIA ATTENDED CARE: Performed by: SURGERY

## 2019-11-18 PROCEDURE — 7100000011 HC PHASE II RECOVERY - ADDTL 15 MIN: Performed by: SURGERY

## 2019-11-18 PROCEDURE — C1781 MESH (IMPLANTABLE): HCPCS | Performed by: SURGERY

## 2019-11-18 PROCEDURE — 6360000002 HC RX W HCPCS: Performed by: SURGERY

## 2019-11-18 DEVICE — PATCH HERN M DIA2.5IN CIR W/ STRP SEPRA TECHNOLOGY ABSRB: Type: IMPLANTABLE DEVICE | Site: ABDOMEN | Status: FUNCTIONAL

## 2019-11-18 RX ORDER — PROPOFOL 10 MG/ML
INJECTION, EMULSION INTRAVENOUS PRN
Status: DISCONTINUED | OUTPATIENT
Start: 2019-11-18 | End: 2019-11-18 | Stop reason: SDUPTHER

## 2019-11-18 RX ORDER — HYDROMORPHONE HCL 110MG/55ML
0.5 PATIENT CONTROLLED ANALGESIA SYRINGE INTRAVENOUS EVERY 5 MIN PRN
Status: DISCONTINUED | OUTPATIENT
Start: 2019-11-18 | End: 2019-11-18 | Stop reason: HOSPADM

## 2019-11-18 RX ORDER — DEXAMETHASONE SODIUM PHOSPHATE 4 MG/ML
INJECTION, SOLUTION INTRA-ARTICULAR; INTRALESIONAL; INTRAMUSCULAR; INTRAVENOUS; SOFT TISSUE PRN
Status: DISCONTINUED | OUTPATIENT
Start: 2019-11-18 | End: 2019-11-18 | Stop reason: SDUPTHER

## 2019-11-18 RX ORDER — ROCURONIUM BROMIDE 10 MG/ML
INJECTION, SOLUTION INTRAVENOUS PRN
Status: DISCONTINUED | OUTPATIENT
Start: 2019-11-18 | End: 2019-11-18 | Stop reason: SDUPTHER

## 2019-11-18 RX ORDER — CEFAZOLIN SODIUM 2 G/100ML
2 INJECTION, SOLUTION INTRAVENOUS
Status: COMPLETED | OUTPATIENT
Start: 2019-11-18 | End: 2019-11-18

## 2019-11-18 RX ORDER — LIDOCAINE HYDROCHLORIDE 20 MG/ML
INJECTION, SOLUTION EPIDURAL; INFILTRATION; INTRACAUDAL; PERINEURAL PRN
Status: DISCONTINUED | OUTPATIENT
Start: 2019-11-18 | End: 2019-11-18 | Stop reason: SDUPTHER

## 2019-11-18 RX ORDER — SUCCINYLCHOLINE/SOD CL,ISO/PF 200MG/10ML
SYRINGE (ML) INTRAVENOUS PRN
Status: DISCONTINUED | OUTPATIENT
Start: 2019-11-18 | End: 2019-11-18 | Stop reason: SDUPTHER

## 2019-11-18 RX ORDER — HYDROCODONE BITARTRATE AND ACETAMINOPHEN 5; 325 MG/1; MG/1
1 TABLET ORAL PRN
Status: COMPLETED | OUTPATIENT
Start: 2019-11-18 | End: 2019-11-18

## 2019-11-18 RX ORDER — LIDOCAINE HYDROCHLORIDE 10 MG/ML
1 INJECTION, SOLUTION EPIDURAL; INFILTRATION; INTRACAUDAL; PERINEURAL
Status: DISCONTINUED | OUTPATIENT
Start: 2019-11-18 | End: 2019-11-18 | Stop reason: HOSPADM

## 2019-11-18 RX ORDER — NEOSTIGMINE METHYLSULFATE 5 MG/5 ML
SYRINGE (ML) INTRAVENOUS PRN
Status: DISCONTINUED | OUTPATIENT
Start: 2019-11-18 | End: 2019-11-18 | Stop reason: SDUPTHER

## 2019-11-18 RX ORDER — FENTANYL CITRATE 50 UG/ML
25 INJECTION, SOLUTION INTRAMUSCULAR; INTRAVENOUS EVERY 5 MIN PRN
Status: DISCONTINUED | OUTPATIENT
Start: 2019-11-18 | End: 2019-11-18 | Stop reason: HOSPADM

## 2019-11-18 RX ORDER — HYDROMORPHONE HCL 110MG/55ML
0.25 PATIENT CONTROLLED ANALGESIA SYRINGE INTRAVENOUS EVERY 5 MIN PRN
Status: DISCONTINUED | OUTPATIENT
Start: 2019-11-18 | End: 2019-11-18 | Stop reason: HOSPADM

## 2019-11-18 RX ORDER — PROMETHAZINE HYDROCHLORIDE 25 MG/ML
6.25 INJECTION, SOLUTION INTRAMUSCULAR; INTRAVENOUS EVERY 30 MIN PRN
Status: DISCONTINUED | OUTPATIENT
Start: 2019-11-18 | End: 2019-11-18 | Stop reason: HOSPADM

## 2019-11-18 RX ORDER — HYDROCODONE BITARTRATE AND ACETAMINOPHEN 5; 325 MG/1; MG/1
2 TABLET ORAL PRN
Status: COMPLETED | OUTPATIENT
Start: 2019-11-18 | End: 2019-11-18

## 2019-11-18 RX ORDER — SODIUM CHLORIDE 9 MG/ML
INJECTION, SOLUTION INTRAVENOUS CONTINUOUS
Status: DISCONTINUED | OUTPATIENT
Start: 2019-11-18 | End: 2019-11-18 | Stop reason: HOSPADM

## 2019-11-18 RX ORDER — SODIUM CHLORIDE 0.9 % (FLUSH) 0.9 %
10 SYRINGE (ML) INJECTION EVERY 12 HOURS SCHEDULED
Status: DISCONTINUED | OUTPATIENT
Start: 2019-11-18 | End: 2019-11-18 | Stop reason: HOSPADM

## 2019-11-18 RX ORDER — BUPIVACAINE HYDROCHLORIDE AND EPINEPHRINE 5; 5 MG/ML; UG/ML
INJECTION, SOLUTION EPIDURAL; INTRACAUDAL; PERINEURAL
Status: COMPLETED | OUTPATIENT
Start: 2019-11-18 | End: 2019-11-18

## 2019-11-18 RX ORDER — FENTANYL CITRATE 50 UG/ML
INJECTION, SOLUTION INTRAMUSCULAR; INTRAVENOUS PRN
Status: DISCONTINUED | OUTPATIENT
Start: 2019-11-18 | End: 2019-11-18 | Stop reason: SDUPTHER

## 2019-11-18 RX ORDER — DIPHENHYDRAMINE HYDROCHLORIDE 50 MG/ML
12.5 INJECTION INTRAMUSCULAR; INTRAVENOUS
Status: DISCONTINUED | OUTPATIENT
Start: 2019-11-18 | End: 2019-11-18 | Stop reason: HOSPADM

## 2019-11-18 RX ORDER — MEPERIDINE HYDROCHLORIDE 25 MG/ML
12.5 INJECTION INTRAMUSCULAR; INTRAVENOUS; SUBCUTANEOUS EVERY 5 MIN PRN
Status: DISCONTINUED | OUTPATIENT
Start: 2019-11-18 | End: 2019-11-18 | Stop reason: HOSPADM

## 2019-11-18 RX ORDER — ONDANSETRON 2 MG/ML
INJECTION INTRAMUSCULAR; INTRAVENOUS PRN
Status: DISCONTINUED | OUTPATIENT
Start: 2019-11-18 | End: 2019-11-18 | Stop reason: SDUPTHER

## 2019-11-18 RX ORDER — SODIUM CHLORIDE 0.9 % (FLUSH) 0.9 %
10 SYRINGE (ML) INJECTION PRN
Status: DISCONTINUED | OUTPATIENT
Start: 2019-11-18 | End: 2019-11-18 | Stop reason: HOSPADM

## 2019-11-18 RX ORDER — GLYCOPYRROLATE 1 MG/5 ML
SYRINGE (ML) INTRAVENOUS PRN
Status: DISCONTINUED | OUTPATIENT
Start: 2019-11-18 | End: 2019-11-18 | Stop reason: SDUPTHER

## 2019-11-18 RX ORDER — HYDROCODONE BITARTRATE AND ACETAMINOPHEN 5; 325 MG/1; MG/1
1 TABLET ORAL EVERY 4 HOURS PRN
Qty: 25 TABLET | Refills: 0 | Status: SHIPPED | OUTPATIENT
Start: 2019-11-18 | End: 2019-11-25

## 2019-11-18 RX ORDER — FENTANYL CITRATE 50 UG/ML
50 INJECTION, SOLUTION INTRAMUSCULAR; INTRAVENOUS EVERY 5 MIN PRN
Status: DISCONTINUED | OUTPATIENT
Start: 2019-11-18 | End: 2019-11-18 | Stop reason: HOSPADM

## 2019-11-18 RX ADMIN — CEFAZOLIN SODIUM 2 G: 2 INJECTION, SOLUTION INTRAVENOUS at 08:17

## 2019-11-18 RX ADMIN — ONDANSETRON 4 MG: 2 INJECTION INTRAMUSCULAR; INTRAVENOUS at 08:47

## 2019-11-18 RX ADMIN — LIDOCAINE HYDROCHLORIDE 100 MG: 20 INJECTION, SOLUTION EPIDURAL; INFILTRATION; INTRACAUDAL; PERINEURAL at 08:22

## 2019-11-18 RX ADMIN — PROPOFOL 180 MG: 10 INJECTION, EMULSION INTRAVENOUS at 08:23

## 2019-11-18 RX ADMIN — FENTANYL CITRATE 50 MCG: 50 INJECTION, SOLUTION INTRAMUSCULAR; INTRAVENOUS at 08:33

## 2019-11-18 RX ADMIN — ROCURONIUM BROMIDE 30 MG: 10 INJECTION, SOLUTION INTRAVENOUS at 08:36

## 2019-11-18 RX ADMIN — SODIUM CHLORIDE: 9 INJECTION, SOLUTION INTRAVENOUS at 07:51

## 2019-11-18 RX ADMIN — Medication 0.8 MG: at 09:16

## 2019-11-18 RX ADMIN — HYDROCODONE BITARTRATE AND ACETAMINOPHEN 1 TABLET: 5; 325 TABLET ORAL at 09:47

## 2019-11-18 RX ADMIN — Medication 4 MG: at 09:16

## 2019-11-18 RX ADMIN — SODIUM CHLORIDE: 9 INJECTION, SOLUTION INTRAVENOUS at 08:18

## 2019-11-18 RX ADMIN — FENTANYL CITRATE 25 MCG: 50 INJECTION, SOLUTION INTRAMUSCULAR; INTRAVENOUS at 08:20

## 2019-11-18 RX ADMIN — DEXAMETHASONE SODIUM PHOSPHATE 4 MG: 4 INJECTION, SOLUTION INTRAMUSCULAR; INTRAVENOUS at 08:47

## 2019-11-18 RX ADMIN — Medication 140 MG: at 08:24

## 2019-11-18 RX ADMIN — FENTANYL CITRATE 25 MCG: 50 INJECTION, SOLUTION INTRAMUSCULAR; INTRAVENOUS at 08:23

## 2019-11-18 ASSESSMENT — PULMONARY FUNCTION TESTS
PIF_VALUE: 16
PIF_VALUE: 19
PIF_VALUE: 18
PIF_VALUE: 17
PIF_VALUE: 20
PIF_VALUE: 16
PIF_VALUE: 18
PIF_VALUE: 19
PIF_VALUE: 19
PIF_VALUE: 17
PIF_VALUE: 29
PIF_VALUE: 17
PIF_VALUE: 26
PIF_VALUE: 8
PIF_VALUE: 17
PIF_VALUE: 18
PIF_VALUE: 17
PIF_VALUE: 1
PIF_VALUE: 18
PIF_VALUE: 17
PIF_VALUE: 20
PIF_VALUE: 17
PIF_VALUE: 18
PIF_VALUE: 0
PIF_VALUE: 18
PIF_VALUE: 17
PIF_VALUE: 21
PIF_VALUE: 18
PIF_VALUE: 17
PIF_VALUE: 17
PIF_VALUE: 16
PIF_VALUE: 16
PIF_VALUE: 17
PIF_VALUE: 20
PIF_VALUE: 17
PIF_VALUE: 18
PIF_VALUE: 17
PIF_VALUE: 0
PIF_VALUE: 18
PIF_VALUE: 17
PIF_VALUE: 19
PIF_VALUE: 18
PIF_VALUE: 0
PIF_VALUE: 18
PIF_VALUE: 24
PIF_VALUE: 18
PIF_VALUE: 18
PIF_VALUE: 17
PIF_VALUE: 19
PIF_VALUE: 0
PIF_VALUE: 1
PIF_VALUE: 16
PIF_VALUE: 19
PIF_VALUE: 16
PIF_VALUE: 18

## 2019-11-18 ASSESSMENT — PAIN SCALES - GENERAL: PAINLEVEL_OUTOF10: 4

## 2019-11-18 ASSESSMENT — PAIN DESCRIPTION - FREQUENCY: FREQUENCY: CONTINUOUS

## 2019-11-18 ASSESSMENT — PAIN DESCRIPTION - DESCRIPTORS
DESCRIPTORS: ACHING
DESCRIPTORS: ACHING;DISCOMFORT

## 2019-11-18 ASSESSMENT — LIFESTYLE VARIABLES: SMOKING_STATUS: 0

## 2019-11-18 ASSESSMENT — PAIN - FUNCTIONAL ASSESSMENT
PAIN_FUNCTIONAL_ASSESSMENT: 0-10
PAIN_FUNCTIONAL_ASSESSMENT: PREVENTS OR INTERFERES SOME ACTIVE ACTIVITIES AND ADLS

## 2019-11-18 ASSESSMENT — PAIN DESCRIPTION - PAIN TYPE: TYPE: SURGICAL PAIN

## 2019-11-18 ASSESSMENT — PAIN DESCRIPTION - ORIENTATION: ORIENTATION: MID

## 2019-11-18 ASSESSMENT — PAIN DESCRIPTION - PROGRESSION: CLINICAL_PROGRESSION: NOT CHANGED

## 2019-11-18 ASSESSMENT — PAIN DESCRIPTION - ONSET: ONSET: PROGRESSIVE

## 2019-11-18 ASSESSMENT — PAIN DESCRIPTION - LOCATION: LOCATION: ABDOMEN

## 2019-12-03 ENCOUNTER — OFFICE VISIT (OUTPATIENT)
Dept: SURGERY | Age: 69
End: 2019-12-03

## 2019-12-03 VITALS — WEIGHT: 244 LBS | SYSTOLIC BLOOD PRESSURE: 136 MMHG | DIASTOLIC BLOOD PRESSURE: 60 MMHG | BODY MASS INDEX: 31.33 KG/M2

## 2019-12-03 DIAGNOSIS — K43.6 INCARCERATED VENTRAL HERNIA: Primary | ICD-10-CM

## 2019-12-03 PROCEDURE — 99024 POSTOP FOLLOW-UP VISIT: CPT | Performed by: SURGERY

## 2019-12-17 ENCOUNTER — ANESTHESIA EVENT (OUTPATIENT)
Dept: ENDOSCOPY | Age: 69
End: 2019-12-17
Payer: MEDICARE

## 2019-12-18 ENCOUNTER — ANESTHESIA (OUTPATIENT)
Dept: ENDOSCOPY | Age: 69
End: 2019-12-18
Payer: MEDICARE

## 2019-12-18 ENCOUNTER — HOSPITAL ENCOUNTER (OUTPATIENT)
Age: 69
Setting detail: OUTPATIENT SURGERY
Discharge: HOME | End: 2019-12-18
Attending: INTERNAL MEDICINE | Admitting: INTERNAL MEDICINE
Payer: MEDICARE

## 2019-12-18 VITALS
WEIGHT: 243.17 LBS | HEIGHT: 72 IN | TEMPERATURE: 97.1 F | BODY MASS INDEX: 32.94 KG/M2 | HEART RATE: 68 BPM | DIASTOLIC BLOOD PRESSURE: 71 MMHG | RESPIRATION RATE: 18 BRPM | OXYGEN SATURATION: 100 % | SYSTOLIC BLOOD PRESSURE: 156 MMHG

## 2019-12-18 VITALS
SYSTOLIC BLOOD PRESSURE: 164 MMHG | OXYGEN SATURATION: 100 % | DIASTOLIC BLOOD PRESSURE: 78 MMHG | RESPIRATION RATE: 29 BRPM

## 2019-12-18 LAB
GLUCOSE BLD-MCNC: 120 MG/DL (ref 70–99)
GLUCOSE BLD-MCNC: 126 MG/DL (ref 70–99)
PERFORMED ON: ABNORMAL
PERFORMED ON: ABNORMAL

## 2019-12-18 PROCEDURE — 2580000003 HC RX 258: Performed by: ANESTHESIOLOGY

## 2019-12-18 PROCEDURE — 3700000000 HC ANESTHESIA ATTENDED CARE: Performed by: INTERNAL MEDICINE

## 2019-12-18 PROCEDURE — 6360000002 HC RX W HCPCS: Performed by: NURSE ANESTHETIST, CERTIFIED REGISTERED

## 2019-12-18 PROCEDURE — 2709999900 HC NON-CHARGEABLE SUPPLY: Performed by: INTERNAL MEDICINE

## 2019-12-18 PROCEDURE — 3700000001 HC ADD 15 MINUTES (ANESTHESIA): Performed by: INTERNAL MEDICINE

## 2019-12-18 PROCEDURE — 7100000000 HC PACU RECOVERY - FIRST 15 MIN: Performed by: INTERNAL MEDICINE

## 2019-12-18 PROCEDURE — 2500000003 HC RX 250 WO HCPCS: Performed by: NURSE ANESTHETIST, CERTIFIED REGISTERED

## 2019-12-18 PROCEDURE — 7100000010 HC PHASE II RECOVERY - FIRST 15 MIN: Performed by: INTERNAL MEDICINE

## 2019-12-18 PROCEDURE — 7100000011 HC PHASE II RECOVERY - ADDTL 15 MIN: Performed by: INTERNAL MEDICINE

## 2019-12-18 PROCEDURE — 3609017100 HC EGD: Performed by: INTERNAL MEDICINE

## 2019-12-18 RX ORDER — ONDANSETRON 2 MG/ML
4 INJECTION INTRAMUSCULAR; INTRAVENOUS
Status: DISCONTINUED | OUTPATIENT
Start: 2019-12-18 | End: 2019-12-18 | Stop reason: HOSPADM

## 2019-12-18 RX ORDER — SODIUM CHLORIDE 9 MG/ML
INJECTION, SOLUTION INTRAVENOUS CONTINUOUS
Status: DISCONTINUED | OUTPATIENT
Start: 2019-12-18 | End: 2019-12-18 | Stop reason: HOSPADM

## 2019-12-18 RX ORDER — SODIUM CHLORIDE 0.9 % (FLUSH) 0.9 %
10 SYRINGE (ML) INJECTION EVERY 12 HOURS SCHEDULED
Status: DISCONTINUED | OUTPATIENT
Start: 2019-12-18 | End: 2019-12-18 | Stop reason: HOSPADM

## 2019-12-18 RX ORDER — LIDOCAINE HYDROCHLORIDE 20 MG/ML
INJECTION, SOLUTION INFILTRATION; PERINEURAL PRN
Status: DISCONTINUED | OUTPATIENT
Start: 2019-12-18 | End: 2019-12-18 | Stop reason: SDUPTHER

## 2019-12-18 RX ORDER — PROPOFOL 10 MG/ML
INJECTION, EMULSION INTRAVENOUS PRN
Status: DISCONTINUED | OUTPATIENT
Start: 2019-12-18 | End: 2019-12-18 | Stop reason: SDUPTHER

## 2019-12-18 RX ORDER — SODIUM CHLORIDE 0.9 % (FLUSH) 0.9 %
10 SYRINGE (ML) INJECTION PRN
Status: DISCONTINUED | OUTPATIENT
Start: 2019-12-18 | End: 2019-12-18 | Stop reason: HOSPADM

## 2019-12-18 RX ADMIN — PROPOFOL 100 MG: 10 INJECTION, EMULSION INTRAVENOUS at 08:48

## 2019-12-18 RX ADMIN — PROPOFOL 100 MG: 10 INJECTION, EMULSION INTRAVENOUS at 08:46

## 2019-12-18 RX ADMIN — SODIUM CHLORIDE: 9 INJECTION, SOLUTION INTRAVENOUS at 08:09

## 2019-12-18 RX ADMIN — LIDOCAINE HYDROCHLORIDE 50 MG: 20 INJECTION, SOLUTION INFILTRATION; PERINEURAL at 08:46

## 2019-12-18 RX ADMIN — PROPOFOL 50 MG: 10 INJECTION, EMULSION INTRAVENOUS at 08:47

## 2019-12-18 RX ADMIN — SODIUM CHLORIDE: 9 INJECTION, SOLUTION INTRAVENOUS at 08:40

## 2019-12-18 ASSESSMENT — PAIN SCALES - GENERAL
PAINLEVEL_OUTOF10: 0

## 2019-12-18 ASSESSMENT — LIFESTYLE VARIABLES: SMOKING_STATUS: 0

## 2019-12-18 ASSESSMENT — PULMONARY FUNCTION TESTS
PIF_VALUE: 0

## 2019-12-18 ASSESSMENT — PAIN - FUNCTIONAL ASSESSMENT: PAIN_FUNCTIONAL_ASSESSMENT: 0-10

## 2020-05-13 NOTE — PROGRESS NOTES
full  · There is no clubbing, cyanosis of the extremities. · No edema  · Femoral Arteries: 2+ and equal  · Pedal Pulses: 2+ and equal   Abdomen:  · No masses or tenderness  · Liver/Spleen: No Abnormalities Noted  Neurological/Psychiatric:  · Alert and oriented in all spheres  · Moves all extremities well  · Exhibits normal gait balance and coordination  · No abnormalities of mood, affect, memory, mentation, or behavior are noted    Nuclear myoview 11/30/18:   Summary    There is a moderate sized fixed inferior defect suggestive of scar.    There is mild inferior hypokinesis with EF=50%.    High risk treadmill with Guillermo treadmill score of -15.        Recommendation    Pt sent to ER for admission.       ECG Findings    Abnormal ECG portion of stress test with 2 mm ST    horizontal depression with stress consistent with    ischemia.         Last Angiogram: 12/5/18:   Patient with markedly abnormal stress with inferior ischemia. Cath done with 75% prox RCA,85% distal RCA. Now post GI w/up with varices identified. GI service cleared him for dual antiplatelet therapy ,now post transfusion     PCI with VOLODYMYR x 2 in prox and distal RCA. He will need dual antiplatelet therapy,prefer for 6 months but at least 1 month    Assessment:    1. Coronary artery disease involving native coronary artery of native heart without angina pectoris  hx NSTEMI : metoprolol changed to nadolol (cirrhosis & varices)  stable : s/p PTCA prox & distal RCA Dec '18    2. Essential hypertension   controlled  Blood pressure 128/64, pulse 68, resp. rate 19, height 6' 2\" (1.88 m), weight 252 lb 12.8 oz (114.7 kg), SpO2 98 %. 3. Mixed hyperlipidemia    well controlled  10/26/19> , LDL Calculated  73, HDL 38, TG 83    Plan:    Meliton Olivares has a stable cardiac status. Cardiac test and lab results personally reviewed by me during this office visit and discussed.  Cardiac cath films from 12/5/18 reviewed by me in office today and discussed with

## 2020-05-21 ENCOUNTER — OFFICE VISIT (OUTPATIENT)
Dept: CARDIOLOGY CLINIC | Age: 70
End: 2020-05-21
Payer: MEDICARE

## 2020-05-21 VITALS
SYSTOLIC BLOOD PRESSURE: 128 MMHG | OXYGEN SATURATION: 98 % | WEIGHT: 252.8 LBS | DIASTOLIC BLOOD PRESSURE: 64 MMHG | BODY MASS INDEX: 32.44 KG/M2 | RESPIRATION RATE: 19 BRPM | HEART RATE: 68 BPM | HEIGHT: 74 IN

## 2020-05-21 PROCEDURE — 1123F ACP DISCUSS/DSCN MKR DOCD: CPT | Performed by: INTERNAL MEDICINE

## 2020-05-21 PROCEDURE — 4040F PNEUMOC VAC/ADMIN/RCVD: CPT | Performed by: INTERNAL MEDICINE

## 2020-05-21 PROCEDURE — G8427 DOCREV CUR MEDS BY ELIG CLIN: HCPCS | Performed by: INTERNAL MEDICINE

## 2020-05-21 PROCEDURE — 99214 OFFICE O/P EST MOD 30 MIN: CPT | Performed by: INTERNAL MEDICINE

## 2020-05-21 PROCEDURE — G8417 CALC BMI ABV UP PARAM F/U: HCPCS | Performed by: INTERNAL MEDICINE

## 2020-05-21 PROCEDURE — 3017F COLORECTAL CA SCREEN DOC REV: CPT | Performed by: INTERNAL MEDICINE

## 2020-05-21 PROCEDURE — 1036F TOBACCO NON-USER: CPT | Performed by: INTERNAL MEDICINE

## 2020-08-27 ENCOUNTER — OFFICE VISIT (OUTPATIENT)
Dept: PRIMARY CARE CLINIC | Age: 70
End: 2020-08-27
Payer: MEDICARE

## 2020-08-27 PROCEDURE — G8417 CALC BMI ABV UP PARAM F/U: HCPCS | Performed by: NURSE PRACTITIONER

## 2020-08-27 PROCEDURE — G8428 CUR MEDS NOT DOCUMENT: HCPCS | Performed by: NURSE PRACTITIONER

## 2020-08-27 PROCEDURE — 99211 OFF/OP EST MAY X REQ PHY/QHP: CPT | Performed by: NURSE PRACTITIONER

## 2020-08-27 NOTE — PROGRESS NOTES
Manohar Toure received a viral test for COVID-19. They were educated on isolation and quarantine as appropriate. For any symptoms, they were directed to seek care from their PCP, given contact information to establish with a doctor, directed to an urgent care or the emergency room.

## 2020-08-27 NOTE — PROGRESS NOTES
Phone message left to call PAT dept at [phone]  for history review and surgery instructions on 8/27/20 @ [phone]

## 2020-08-28 LAB — SARS-COV-2, NAA: NOT DETECTED

## 2020-08-28 RX ORDER — LANSOPRAZOLE 30 MG/1
30 CAPSULE, DELAYED RELEASE ORAL DAILY
COMMUNITY
End: 2022-04-01

## 2020-08-28 NOTE — PROGRESS NOTES
C-Difficile admission screening and protocol:     * Admitted with diarrhea? YES____    NO__X___     *Prior history of C-Diff. In last 3 months? YES____   NO__X___     *Antibiotic use in the past 6-8 weeks? NO_X_____YES______                 If yes which  ANTIBIOTIC AND REASON______     *Prior hospitalization or nursing home in the last month?  YES____   NO___X_

## 2020-08-28 NOTE — PROGRESS NOTES

## 2020-08-28 NOTE — PROGRESS NOTES
[address] time_____0700_______        Surgery time_____0830_______    Take the following medications with a sip of water: Follow your MD/Surgeons pre-procedure instructions regarding your medications    Do not eat or drink anything after 12:00 midnight prior to your surgery. This includes water chewing gum, mints and ice chips. You may brush your teeth and gargle the morning of your surgery, but do not swallow the water     Please see your family doctor/pediatrician for a history and physical and/or concerning medications. Bring any test results/reports from your physicians office. If you are under the care of a heart doctor or specialist doctor, please be aware that you may be asked to them for clearance    You may be asked to stop blood thinners such as Coumadin, Plavix, Fragmin, Lovenox, etc., or any anti-inflammatories such as:  Aspirin, Ibuprofen, Advil, Naproxen prior to your surgery. We also ask that you stop any OTC medications such as fish oil, vitamin E, glucosamine, garlic, Multivitamins, COQ 10, etc.    We ask that you do not smoke 24 hours prior to surgery  We ask that you do not  drink any alcoholic beverages 24 hours prior to surgery     You must make arrangements for a responsible adult to take you home after your surgery. For your safety you will not be allowed to leave alone or drive yourself home. Your surgery will be cancelled if you do not have a ride home. Also for your safety, it is strongly suggested that someone stay with you the first 24 hours after your surgery. A parent or legal guardian must accompany a child scheduled for surgery and plan to stay at the hospital until the child is discharged. Please do not bring other children with you. For your comfort, please wear simple loose fitting clothing to the hospital.  Please do not bring valuables.     Do not wear any make-up or nail polish on your fingers or toes      For your safety, please do not wear any jewelry or body piercing's on the day of surgery. All jewelry must be removed. If you have dentures, they will be removed before going to operating room. For your convenience, we will provide you with a container. If you wear contact lenses or glasses, they will be removed, please bring a case for them. If you have a living will and a durable power of  for healthcare, please bring in a copy. As part of our patient safety program to minimize surgical site infections, we ask you to do the following:    · Please notify your surgeon if you develop any illness between         now and the  day of your surgery. · This includes a cough, cold, fever, sore throat, nausea,         or vomiting, and diarrhea, etc.  ·  Please notify your surgeon if you experience dizziness, shortness         of breath or blurred vision between now and the time of your surgery. Do not shave your operative site 96 hours prior to surgery. For face and neck surgery, men may use an electric razor 48 hours   prior to surgery. You may shower the night before surgery or the morning of   your surgery with an antibacterial soap. You will need to bring a photo ID and insurance card    [facility] has an onsite pharmacy, would you like to utilize our pharmacy     If you will be staying overnight and use a C-pap machine, please bring   your C-pap to hospital     Our goal is to provide you with excellent care, therefore, visitors will be limited to two(2) in the room at a time so that we may focus on providing this care for you. Please contact pre-admission testing if you have any further questions. [facility] phone number:  [phone] PAT fax number:  [phone]  Please note these are generalized instructions for all surgical cases, you may be provided with more specific instructions according to your surgery.

## 2020-09-01 ENCOUNTER — ANESTHESIA EVENT (OUTPATIENT)
Dept: ENDOSCOPY | Age: 70
End: 2020-09-01
Payer: MEDICARE

## 2020-09-02 ENCOUNTER — HOSPITAL ENCOUNTER (OUTPATIENT)
Age: 70
Setting detail: OUTPATIENT SURGERY
Discharge: HOME | End: 2020-09-02
Attending: INTERNAL MEDICINE | Admitting: INTERNAL MEDICINE
Payer: MEDICARE

## 2020-09-02 ENCOUNTER — ANESTHESIA (OUTPATIENT)
Dept: ENDOSCOPY | Age: 70
End: 2020-09-02
Payer: MEDICARE

## 2020-09-02 VITALS
HEART RATE: 63 BPM | HEIGHT: 74 IN | TEMPERATURE: 97.6 F | OXYGEN SATURATION: 99 % | RESPIRATION RATE: 16 BRPM | SYSTOLIC BLOOD PRESSURE: 131 MMHG | WEIGHT: 245.04 LBS | DIASTOLIC BLOOD PRESSURE: 70 MMHG | BODY MASS INDEX: 31.45 KG/M2

## 2020-09-02 VITALS
DIASTOLIC BLOOD PRESSURE: 64 MMHG | SYSTOLIC BLOOD PRESSURE: 135 MMHG | OXYGEN SATURATION: 98 % | RESPIRATION RATE: 33 BRPM

## 2020-09-02 LAB
GLUCOSE BLD-MCNC: 120 MG/DL (ref 70–99)
GLUCOSE BLD-MCNC: 143 MG/DL (ref 70–99)
PERFORMED ON: ABNORMAL
PERFORMED ON: ABNORMAL

## 2020-09-02 PROCEDURE — 7100000000 HC PACU RECOVERY - FIRST 15 MIN: Performed by: INTERNAL MEDICINE

## 2020-09-02 PROCEDURE — 7100000001 HC PACU RECOVERY - ADDTL 15 MIN: Performed by: INTERNAL MEDICINE

## 2020-09-02 PROCEDURE — 7100000010 HC PHASE II RECOVERY - FIRST 15 MIN: Performed by: INTERNAL MEDICINE

## 2020-09-02 PROCEDURE — 88305 TISSUE EXAM BY PATHOLOGIST: CPT

## 2020-09-02 PROCEDURE — 7100000011 HC PHASE II RECOVERY - ADDTL 15 MIN: Performed by: INTERNAL MEDICINE

## 2020-09-02 PROCEDURE — 3700000000 HC ANESTHESIA ATTENDED CARE: Performed by: INTERNAL MEDICINE

## 2020-09-02 PROCEDURE — 2500000003 HC RX 250 WO HCPCS: Performed by: NURSE ANESTHETIST, CERTIFIED REGISTERED

## 2020-09-02 PROCEDURE — 3609012400 HC EGD TRANSORAL BIOPSY SINGLE/MULTIPLE: Performed by: INTERNAL MEDICINE

## 2020-09-02 PROCEDURE — 6360000002 HC RX W HCPCS: Performed by: NURSE ANESTHETIST, CERTIFIED REGISTERED

## 2020-09-02 PROCEDURE — 3700000001 HC ADD 15 MINUTES (ANESTHESIA): Performed by: INTERNAL MEDICINE

## 2020-09-02 PROCEDURE — 2580000003 HC RX 258: Performed by: ANESTHESIOLOGY

## 2020-09-02 PROCEDURE — 2709999900 HC NON-CHARGEABLE SUPPLY: Performed by: INTERNAL MEDICINE

## 2020-09-02 PROCEDURE — 88342 IMHCHEM/IMCYTCHM 1ST ANTB: CPT

## 2020-09-02 RX ORDER — SODIUM CHLORIDE 9 MG/ML
INJECTION, SOLUTION INTRAVENOUS CONTINUOUS
Status: DISCONTINUED | OUTPATIENT
Start: 2020-09-02 | End: 2020-09-02 | Stop reason: HOSPADM

## 2020-09-02 RX ORDER — LIDOCAINE HYDROCHLORIDE 20 MG/ML
INJECTION, SOLUTION EPIDURAL; INFILTRATION; INTRACAUDAL; PERINEURAL PRN
Status: DISCONTINUED | OUTPATIENT
Start: 2020-09-02 | End: 2020-09-02 | Stop reason: SDUPTHER

## 2020-09-02 RX ORDER — ONDANSETRON 2 MG/ML
4 INJECTION INTRAMUSCULAR; INTRAVENOUS
Status: DISCONTINUED | OUTPATIENT
Start: 2020-09-02 | End: 2020-09-02 | Stop reason: HOSPADM

## 2020-09-02 RX ORDER — SODIUM CHLORIDE 0.9 % (FLUSH) 0.9 %
10 SYRINGE (ML) INJECTION PRN
Status: DISCONTINUED | OUTPATIENT
Start: 2020-09-02 | End: 2020-09-02 | Stop reason: HOSPADM

## 2020-09-02 RX ORDER — PROPOFOL 10 MG/ML
INJECTION, EMULSION INTRAVENOUS PRN
Status: DISCONTINUED | OUTPATIENT
Start: 2020-09-02 | End: 2020-09-02 | Stop reason: SDUPTHER

## 2020-09-02 RX ORDER — SODIUM CHLORIDE 0.9 % (FLUSH) 0.9 %
10 SYRINGE (ML) INJECTION EVERY 12 HOURS SCHEDULED
Status: DISCONTINUED | OUTPATIENT
Start: 2020-09-02 | End: 2020-09-02 | Stop reason: HOSPADM

## 2020-09-02 RX ORDER — PROPOFOL 10 MG/ML
INJECTION, EMULSION INTRAVENOUS CONTINUOUS PRN
Status: DISCONTINUED | OUTPATIENT
Start: 2020-09-02 | End: 2020-09-02 | Stop reason: SDUPTHER

## 2020-09-02 RX ADMIN — PROPOFOL 200 MCG/KG/MIN: 10 INJECTION, EMULSION INTRAVENOUS at 08:25

## 2020-09-02 RX ADMIN — SODIUM CHLORIDE: 9 INJECTION, SOLUTION INTRAVENOUS at 08:13

## 2020-09-02 RX ADMIN — LIDOCAINE HYDROCHLORIDE 100 MG: 20 INJECTION, SOLUTION EPIDURAL; INFILTRATION; INTRACAUDAL; PERINEURAL at 08:25

## 2020-09-02 RX ADMIN — PROPOFOL 100 MG: 10 INJECTION, EMULSION INTRAVENOUS at 08:25

## 2020-09-02 ASSESSMENT — PULMONARY FUNCTION TESTS
PIF_VALUE: 0

## 2020-09-02 ASSESSMENT — PAIN SCALES - GENERAL
PAINLEVEL_OUTOF10: 0

## 2020-09-02 ASSESSMENT — LIFESTYLE VARIABLES: SMOKING_STATUS: 0

## 2020-09-02 NOTE — ANESTHESIA PRE PROCEDURE
Department of Anesthesiology  Preprocedure Note       Name:  Abner Hoskins   Age:  65 y.o.  :  1950                                          MRN:  [id number]         Date:  2020      Surgeon: Maria Guadalupe Degree):  Smiley Borrero MD    Procedure: ESOPHAGOGASTRODUODENOSCOPY WITH POSSIBLE BANDING (N/A )    Medications prior to admission:   Prior to Admission medications    Medication Sig Start Date End Date Taking? Authorizing Provider   lansoprazole (PREVACID) 30 MG delayed release capsule Take 30 mg by mouth daily   Yes Historical Provider, MD   methocarbamol (ROBAXIN) 750 MG tablet Take 750 mg by mouth every 8 hours as needed 19  Yes Historical Provider, MD   aspirin 81 MG tablet Take 1 tablet by mouth daily 19  Yes Sean Yeung MD   atorvastatin (LIPITOR) 40 MG tablet Take 1 tablet by mouth nightly 19  Yes PATRICK Wylie - CNP   glipiZIDE (GLUCOTROL) 10 MG tablet Take 10 mg by mouth 2 times daily (before meals)   Yes Historical Provider, MD   metFORMIN (GLUCOPHAGE) 1000 MG tablet Take 1 tablet by mouth 2 times daily (with meals) 18  Yes Villa Richardson MD   nadolol (CORGARD) 80 MG tablet Take 1 tablet by mouth daily  Patient taking differently: Take 40 mg by mouth daily 40 mg daily 18  Yes Villa Richardson MD   ferrous sulfate 325 (65 Fe) MG tablet Take 325 mg by mouth 2 times daily    Yes Historical Provider, MD   losartan-hydrochlorothiazide (HYZAAR) 100-25 MG per tablet Take 1 tablet by mouth daily 17  Yes Jovi Mahoney MD   Multiple Vitamins-Minerals (MULTIVITAMIN PO) Take 1 tablet by mouth daily   Yes Historical Provider, MD   CINNAMON PO Take 1,000 mg by mouth 2 times daily   Yes Historical Provider, MD   glucose blood VI test strips (FREESTYLE LITE) strip Test blood sugar once daily.  10/6/14   Jovi Mahoney MD       Current medications:    Current Facility-Administered Medications   Medication Dose Route Frequency Provider Last Rate Last Dose    0.9 UPPER GASTROINTESTINAL ENDOSCOPY  12/04/2018    with biopsy    UPPER GASTROINTESTINAL ENDOSCOPY N/A 12/4/2018    EGD BIOPSY performed by Benny Lee MD at [facility] N/A 3/6/2019    EGD BAND LIGATION performed by Mirtha Schreiber MD at [place] [address] N/A 4/3/2019    ESOPHAGOGASTRODUODENOSCOPY WITH BANDING performed by Mirtha Schreiber MD at [place] [address] N/A 6/5/2019    ESOPHAGOGASTRODUODENOSCOPY performed by Mirtha Schreiber MD at [place] [address] N/A 12/18/2019    EGD ESOPHAGOGASTRODUODENOSCOPY performed by Mirtha Schreiber MD at [address] [place] [address] N/A 11/18/2019    OPEN REPAIR OF INCARCERATED VENTRAL HERNIA WITH MESH performed by Zonia Daly MD at [address] [place] [address] History:    Social History     Tobacco Use    Smoking status: Never Smoker    Smokeless tobacco: Never Used   Substance Use Topics    Alcohol use: Not Currently     Alcohol/week: 0.0 standard drinks     Comment: quit drinking Dec 4 2018                                Counseling given: Not Answered      Vital Signs (Current):   Vitals:    08/28/20 1246 09/02/20 0700 09/02/20 0710   Pulse:   73   Resp:   14   Temp:   97.8 °F (36.6 °C)   TempSrc:   Temporal   SpO2:   98%   Weight: 245 lb (111.1 kg) 245 lb 0.7 oz (111.1 kg)    Height: 6' 2\" (1.88 m)                                                BP Readings from Last 3 Encounters:   05/21/20 128/64   12/18/19 (!) 164/78   12/18/19 (!) 156/71       NPO Status:   >8hrs                                                                                BMI:   Wt Readings from Last 3 Encounters:   09/02/20 245 lb 0.7 oz (111.1 kg)   05/21/20 252 lb 12.8 oz (114.7 kg)   12/18/19 243 lb 2.7 oz (110.3 kg)     Body mass index is 31.46 kg/m².     CBC:   Lab Results   Component Value Date    WBC 5.8 11/18/2019    RBC 4.10 11/18/2019    RBC 4.03 11/23/2016    HGB 13.0 11/18/2019    HCT 36.9 11/18/2019    MCV 90.1 11/18/2019    RDW 14.1 11/18/2019    PLT 79 11/18/2019       CMP:   Lab Results   Component Value Date     10/26/2019    K 3.7 10/26/2019    K 3.8 12/06/2018    CL 99 10/26/2019    CO2 25 10/26/2019    BUN 17 10/26/2019    CREATININE 1.13 10/26/2019    GFRAA 77 10/26/2019    GFRAA >60 03/08/2013    AGRATIO 1.4 10/26/2019    LABGLOM 64 10/26/2019    GLUCOSE 106 10/26/2019    PROT 6.8 10/26/2019    PROT 7.5 03/08/2013    CALCIUM 9.4 10/26/2019    BILITOT 1.7 10/26/2019    ALKPHOS 86 10/26/2019    AST 51 10/26/2019    ALT 69 10/26/2019       POC Tests:   No results for input(s): POCGLU, POCNA, POCK, POCCL, POCBUN, POCHEMO, POCHCT in the last 72 hours. Coags:   Lab Results   Component Value Date    PROTIME 13.0 11/30/2018    INR 1.14 11/30/2018       HCG (If Applicable): No results found for: PREGTESTUR, PREGSERUM, HCG, HCGQUANT     ABGs:   Lab Results   Component Value Date    PHART 7.219 12/11/2015    PO2ART 414 12/11/2015    AKA2VXQ 68 12/11/2015    PHD0ICJ 27.8 12/11/2015    BEART 0 12/11/2015    V6DPPORC 100 12/11/2015        Type & Screen (If Applicable):  No results found for: [facility]    Anesthesia Evaluation  Patient summary reviewed no history of anesthetic complications:   Airway: Mallampati: II  TM distance: >3 FB   Neck ROM: full  Mouth opening: > = 3 FB Dental:          Pulmonary:Negative Pulmonary ROS breath sounds clear to auscultation      (-) COPD, asthma, sleep apnea and not a current smoker          Patient did not smoke on day of surgery.                  Cardiovascular:  Exercise tolerance: poor (<4 METS),   (+) hypertension: moderate, past MI: > 6 months, CAD: obstructive, CABG/stent (2 stents. good exercise tolerance): no interval change, hyperlipidemia    (-) pacemaker and dysrhythmias      Rhythm: regular  Rate: normal                    Neuro/Psych:      (-) seizures, TIA and CVA GI/Hepatic/Renal:   (+) GERD: well controlled, liver disease (MARIE): portal hypertension,      (-) no renal disease       Endo/Other:    (+) DiabetesType II DM, well controlled, , blood dyscrasia (off thinners (stents), 3d): arthritis:., no malignancy/cancer. (-) hypothyroidism, hyperthyroidism, no malignancy/cancer               Abdominal:           Vascular: negative vascular ROS. Anesthesia Plan      MAC     ASA 3       Induction: intravenous. Anesthetic plan and risks discussed with patient. Plan discussed with CRNA. This pre-anesthesia assessment may be used as a history and physical.    DOS STAFF ADDENDUM:    Pt seen and examined, chart reviewed (including anesthesia, drug and allergy history). No interval changes to history and physical examination. Anesthetic plan, risks, benefits, alternatives, and personnel involved discussed with patient. Patient verbalized an understanding and agrees to proceed.       Claudeen Donovan, MD  September 2, 2020  7:11 AM      Claudeen Donovan, MD   9/2/2020

## 2020-09-02 NOTE — ANESTHESIA POSTPROCEDURE EVALUATION
Department of Anesthesiology  Postprocedure Note    Patient: Gómez Villegas  MRN: [id number]  YOB: 1950  Date of evaluation: 9/2/2020  Time:  9:22 AM     Procedure Summary     Date:  09/02/20 Room / Location:  [address] [place] [address]    Anesthesia Start:  7028 Anesthesia Stop:  9306    Procedure:  EGD BIOPSY (N/A ) Diagnosis:  (ESOPHAGEAL VARICES, CIRRHOSIS)    Surgeon:  Bita Dexter MD Responsible Provider:  Luca Monaco MD    Anesthesia Type:  MAC ASA Status:  3          Anesthesia Type: MAC    Queenie Phase I: Queenie Score: 10    Queenie Phase II: Queenie Score: 10    Last vitals: Reviewed and per EMR flowsheets.        Anesthesia Post Evaluation    Patient location during evaluation: PACU  Patient participation: complete - patient participated  Level of consciousness: awake and alert  Airway patency: patent  Nausea & Vomiting: no nausea and no vomiting  Complications: no  Cardiovascular status: hemodynamically stable  Respiratory status: acceptable  Hydration status: stable

## 2020-09-02 NOTE — PROCEDURES
[address] [facility]                                 PROCEDURE NOTE    PATIENT NAME: Meenu Rider                   :        1950  MED REC NO:   [id number]                          ROOM:  ACCOUNT NO:   [de-identified]                           ADMIT DATE: 2020  PROVIDER:     Fallon Kothari MD    EGD    DATE OF PROCEDURE:  2020    REFERRING PROVIDER:  Chip Louise MD    PATIENT HISTORY:  A 78-year-old male, outpatient. INSTRUMENT USED:  Olympus GIF-Q180. MEDICATIONS OF PROCEDURE:  The patient was premedicated with Diprivan  intravenously as administered by the anesthesiology service. INDICATIONS:  The patient has a history of cirrhosis and is undergoing  an EGD for purposes of variceal screening. PROCEDURE:  The endoscope was inserted into the esophagus without  difficulty. There were small distal varices in the esophagus, but they  did not approach the size where banding would be required. They were  photo documented. The Z-line was located at 45 cm. There were no  gastric varices. There were, however, persistent gastric antral nodules  with overlying superficial erosions. This would be consistent with  nodular gastritis. Because of the persistence of these lesions, I did  obtain representative biopsies. The duodenal bulb and descending  duodenum were normal.    ESTIMATED BLOOD LOSS:  None. IMPRESSION:  1. Small distal esophageal varices that did not require banding. 2.  No gastric varices. 3.  Persistent apparent nodular antral gastritis. This was biopsied. PLAN:  The patient will call the office for biopsy results and  recommendations. He should undergo a followup EGD in four to six  months. KEITH Car MD    D: 2020 8:55:00       T: 2020 11:57:10     [id number]  Job#: [id number]     Doc#: [id number]    CC:  Keith Kothari MD

## 2020-09-02 NOTE — H&P
ESOPHAGOGASTRODUODENOSCOPY performed by Chauncey Abel MD at [address] [facility] N/A 11/18/2019    OPEN REPAIR OF INCARCERATED VENTRAL HERNIA WITH MESH performed by Derian Zambrano MD at [facility]     Medications prior to admission:   Prior to Admission medications    Medication Sig Start Date End Date Taking? Authorizing Provider   lansoprazole (PREVACID) 30 MG delayed release capsule Take 30 mg by mouth daily   Yes Historical Provider, MD   methocarbamol (ROBAXIN) 750 MG tablet Take 750 mg by mouth every 8 hours as needed 4/1/19  Yes Historical Provider, MD   aspirin 81 MG tablet Take 1 tablet by mouth daily 4/22/19  Yes Mazin Benjamin MD   atorvastatin (LIPITOR) 40 MG tablet Take 1 tablet by mouth nightly 1/30/19  Yes PATRICK Mendez - CNP   glipiZIDE (GLUCOTROL) 10 MG tablet Take 10 mg by mouth 2 times daily (before meals)   Yes Historical Provider, MD   metFORMIN (GLUCOPHAGE) 1000 MG tablet Take 1 tablet by mouth 2 times daily (with meals) 12/7/18  Yes Trenton Tejada MD   nadolol (CORGARD) 80 MG tablet Take 1 tablet by mouth daily  Patient taking differently: Take 40 mg by mouth daily 40 mg daily 12/6/18  Yes Trenton Tejada MD   ferrous sulfate 325 (65 Fe) MG tablet Take 325 mg by mouth 2 times daily    Yes Historical Provider, MD   losartan-hydrochlorothiazide (HYZAAR) 100-25 MG per tablet Take 1 tablet by mouth daily 9/6/17  Yes Ivonne Noriega MD   Multiple Vitamins-Minerals (MULTIVITAMIN PO) Take 1 tablet by mouth daily   Yes Historical Provider, MD   CINNAMON PO Take 1,000 mg by mouth 2 times daily   Yes Historical Provider, MD   glucose blood VI test strips (FREESTYLE LITE) strip Test blood sugar once daily. 10/6/14   Ivonne Noriega MD     Allergies:    Patient has no known allergies.   Social History:   Social History     Socioeconomic History    Marital status:      Spouse name: Carolyn Dennison Number of children: Not on file    Years of education: 12    Highest education level: Not on file   Occupational History    Not on file   Social Needs    Financial resource strain: Not on file    Food insecurity     Worry: Not on file     Inability: Not on file    Transportation needs     Medical: Not on file     Non-medical: Not on file   Tobacco Use    Smoking status: Never Smoker    Smokeless tobacco: Never Used   Substance and Sexual Activity    Alcohol use: Not Currently     Alcohol/week: 0.0 standard drinks     Comment: quit drinking Dec 4 2018    Drug use: Never    Sexual activity: Yes     Partners: Female   Lifestyle    Physical activity     Days per week: Not on file     Minutes per session: Not on file    Stress: Not on file   Relationships    Social connections     Talks on phone: Not on file     Gets together: Not on file     Attends [employer] service: Not on file     Active member of club or organization: Not on file     Attends meetings of clubs or organizations: Not on file     Relationship status: Not on file    Intimate partner violence     Fear of current or ex partner: Not on file     Emotionally abused: Not on file     Physically abused: Not on file     Forced sexual activity: Not on file   Other Topics Concern    Not on file   Social History Narrative    Not on file           Family History:   Family History   Problem Relation Age of Onset    Kidney Disease Mother     Lung Cancer Mother     Heart Disease Father     Diabetes Father     Arthritis Brother     Diabetes Brother          PHYSICAL EXAM:      /66   Pulse 73   Temp 97.8 °F (36.6 °C) (Temporal)   Resp 14   Ht 6' 2\" (1.88 m)   Wt 245 lb 0.7 oz (111.1 kg)   SpO2 98%   BMI 31.46 kg/m²  I        Heart: Normal    Lungs: Normal    Abdomen: Normal      ASA Grade: ASA 3 - Patient with moderate systemic disease with functional limitations    II (soft palate, uvula, fauces visible)  ASSESSMENT AND PLAN:    1. Patient is a 65 y.o. male here for EGD  2.   Procedure options, risks and benefits reviewed with patient who expresses understanding.

## 2020-09-02 NOTE — BRIEF OP NOTE
Brief Postoperative Note    Alfredo Morris  YOB: 1950  [id number]    Pre-operative Diagnosis: Cirrhosis, variceal screening    Post-operative Diagnosis: Same    Procedure: EGD    Anesthesia: MAC    Surgeons/Assistants: Keke Merlos MD    Estimated Blood Loss: None    Complications: None    Specimens: Was Obtained: Gastric antral nodules    Findings: See dictated report    Electronically signed by Keke Merlos MD on 9/2/2020 at 8:38 AM

## 2020-09-02 NOTE — PROGRESS NOTES
Alert and oriented. No c/o. Vss. abd soft. Tolerated sitting up and po fluids and crackers well. Family at bedside. Verbalized understanding of discharge instructions.

## 2020-09-02 NOTE — OP NOTE
Operative Note      Patient: Marshal Recio  YOB: 1950  MRN: [id number]    Date of Procedure: 9/2/2020    Pre-Op Diagnosis: ESOPHAGEAL VARICES, CIRRHOSIS    Post-Op Diagnosis: Same       Procedure(s):  EGD BIOPSY    Surgeon(s):  Augustine Mares MD    Assistant:   * No surgical staff found *    Anesthesia: Monitor Anesthesia Care    Estimated Blood Loss (mL): None    Complications: None    Specimens:   ID Type Source Tests Collected by Time Destination   A : Biopsy gastric antral nodules Tissue Stomach SURGICAL PATHOLOGY Augustine Mares MD 9/2/2020 0831        Implants:  * No implants in log *      Drains: * No LDAs found *    Findings: See dictated report    Detailed Description of Procedure:   See dictated report    Electronically signed by Augustine Mares MD on 9/2/2020 at 8:40 AM

## 2020-12-01 NOTE — PROGRESS NOTES
[facility]   Cardiac Follow up     Referring Provider:  Maria L Cordon     Chief Complaint   Patient presents with    Hyperlipidemia     no complaints     Follow-up     6 mo       History of Present Illness:  Jere Barnes a history of NSTEMI / CAD s/p PTCA RCA (12/2018), hypertension, and hyperlipidemia. His other history includes: JOSE LUIS, esophageal varices, mild portal hypertensive gastropathy / portal HTN with cirrhosis and ITP. Stress echo 11/2019 was stable. Today, he is here for regular follow up. He states he feels well. He denies exertional chest pain, SOUZA/PND, palpitations, light-headedness, edema. He reports his wife was recently diagnosed with non-small cell lung cancer (non-smoker) and is onj radiation/oral chemo. He works at [employer], states he walks 2-3 miles most days, he has no symptoms with this states \"im on my feet 8 hours a day. \"    Past Medical History:   has a past medical history of Arthritis, CAD (coronary artery disease), History of blood transfusion, Hyperlipidemia, Hypertension, MARIE (nonalcoholic steatohepatitis), Osteoarthritis, Spinal stenosis, Thrombocytopenia ([address] Type 2 diabetes mellitus without complication ([address] Type II or unspecified type diabetes mellitus without mention of complication, not stated as uncontrolled, and Wears glasses. Surgical History:   has a past surgical history that includes cyst removal (Right); Colonoscopy (12/04/2018); Upper gastrointestinal endoscopy (12/04/2018); Upper gastrointestinal endoscopy (N/A, 12/4/2018); Colonoscopy (N/A, 12/4/2018); Coronary angioplasty with stent (12/04/2018); Upper gastrointestinal endoscopy (N/A, 3/6/2019); Upper gastrointestinal endoscopy (N/A, 4/3/2019); Upper gastrointestinal endoscopy (N/A, 6/5/2019); Inguinal hernia repair (Left, 12/11/15); Hand surgery (Right, 2013); ventral hernia repair (N/A, 11/18/2019);  Upper gastrointestinal endoscopy (N/A, 12/18/2019); back surgery (11/25/2016); back known allergies. Review of Systems:   · Constitutional: there has been no unanticipated weight loss. There's been no change in energy level, sleep pattern, or activity level. · Eyes: No visual changes or diplopia. No scleral icterus. · ENT: No Headaches, hearing loss or vertigo. No mouth sores or sore throat. · Cardiovascular: Reviewed in HPI  · Respiratory: No cough or wheezing, no sputum production. No hematemesis. · Gastrointestinal: No abdominal pain, appetite loss, blood in stools. No change in bowel or bladder habits. · Genitourinary: No dysuria, trouble voiding, or hematuria. · Musculoskeletal:  No gait disturbance, weakness or joint complaints. · Integumentary: No rash or pruritis. · Neurological: No headache, diplopia, change in muscle strength, numbness or tingling. No change in gait, balance, coordination, mood, affect, memory, mentation, behavior. · Psychiatric: No anxiety, no depression. · Endocrine: No malaise, fatigue or temperature intolerance. No excessive thirst, fluid intake, or urination. No tremor. · Hematologic/Lymphatic: No abnormal bruising or bleeding, blood clots or swollen lymph nodes. · Allergic/Immunologic: No nasal congestion or hives. Physical Examination:    Vitals:    12/02/20 0928   BP: 128/64   Pulse: 76   SpO2: 99%        Wt Readings from Last 1 Encounters:   12/02/20 250 lb (113.4 kg)       Constitutional and General Appearance: NAD  Skin:good turgor,intact without lesions  HEENT: EOMI ,normal  Neck:no JVD    Respiratory:  · Normal excursion and expansion without use of accessory muscles  · Resp Auscultation: Normal breath sounds without dullness  Cardiovascular:  · The apical impulses not displaced  · Heart tones are crisp and normal  · Cervical veins are not engorged  · The carotid upstroke is normal in amplitude and contour without delay or bruit  · Peripheral pulses are symmetrical and full  · There is no clubbing, cyanosis of the extremities.   · No edema  · Femoral Arteries: 2+ and equal  · Pedal Pulses: 2+ and equal   Abdomen:  · No masses or tenderness  · Liver/Spleen: No Abnormalities Noted  Neurological/Psychiatric:  · Alert and oriented in all spheres  · Moves all extremities well  · Exhibits normal gait balance and coordination  · No abnormalities of mood, affect, memory, mentation, or behavior are noted    Nuclear myoview 11/30/18   Summary    There is a moderate sized fixed inferior defect suggestive of scar.    There is mild inferior hypokinesis with EF=50%.    High risk treadmill with Guillermo treadmill score of -15.        Recommendation    Pt sent to ER for admission.       ECG Findings    Abnormal ECG portion of stress test with 2 mm ST    horizontal depression with stress consistent with    ischemia.       Abd u/s 12/4/2018  Aorta is poorly visualized with normal caliber proximally and in the    midportion. [facility] 12/5/18   Patient with markedly abnormal stress with inferior ischemia. Cath done with 75% prox RCA,85% distal RCA. Now post GI w/up with varices identified. GI service cleared him for dual antiplatelet therapy ,now post transfusion     PCI with VOLODYMYR x 2 in prox and distal RCA. He will need dual antiplatelet therapy, prefer for 6 months but at least 1 month    Stress echo 11/14/2019  -Normal stress echocardiogram study.   -Target heart rate not achieved. -Normal left ventricle size, wall thickness, and systolic function with an estimated ejection fraction of 55%. There is concentric left ventricular hypertrophy.   -Mild mitral regurgitation.   -Trivial aortic regurgitation.   -Mild tricuspid regurgitation with PASP of 30 mmHg. Assessment:    1. Coronary artery disease involving native coronary artery of native heart without angina pectoris  Stable, no anginal symptoms  hx NSTEMI: metoprolol changed to nadolol (cirrhosis & varices)  Not on Plavix due to his bleeding issues, but tolerates baby aspirin.   s/p PTCA prox & distal RCA Dec 2018  Stress echo 11/14/2019> Normal, EF 55%    2. Essential hypertension   Stable  Blood pressure 128/64, pulse 76, height 6' 2\" (1.88 m), weight 250 lb (113.4 kg), SpO2 99 %. 3. Mixed hyperlipidemia    Well controlled on Lipitor 40mg  11/14/20> , , HDL 33, LDL 62      Plan:    Dagoberto Monae has a stable cardiac status. Cardiac test and lab results personally reviewed by me during this office visit and discussed. 1. Labs reviewed  2. No med changes  3. Return for follow up in 6 months    I appreciate the opportunity of cooperating in the care of this individual.    Johnathan Anaya. Lana Villarreal M.D., [facility]    Patient's problem list, medications, allergies, past medical, surgical, social and family histories were reviewed and updated as appropriate. Scribe's attestation: This note was scribed in the presence of Dr. Lana Villarreal MD, by Beth Zaldivar RN. The scribe's documentation has been prepared under my direction and personally reviewed by me in its entirety. I confirm that the note above accurately reflects all work, treatment, procedures, and medical decision making performed by me.

## 2020-12-02 ENCOUNTER — OFFICE VISIT (OUTPATIENT)
Dept: CARDIOLOGY CLINIC | Age: 70
End: 2020-12-02
Payer: MEDICARE

## 2020-12-02 VITALS
OXYGEN SATURATION: 99 % | DIASTOLIC BLOOD PRESSURE: 64 MMHG | HEIGHT: 74 IN | SYSTOLIC BLOOD PRESSURE: 128 MMHG | HEART RATE: 76 BPM | BODY MASS INDEX: 32.08 KG/M2 | WEIGHT: 250 LBS

## 2020-12-02 PROCEDURE — 1036F TOBACCO NON-USER: CPT | Performed by: INTERNAL MEDICINE

## 2020-12-02 PROCEDURE — 99214 OFFICE O/P EST MOD 30 MIN: CPT | Performed by: INTERNAL MEDICINE

## 2020-12-02 PROCEDURE — G8427 DOCREV CUR MEDS BY ELIG CLIN: HCPCS | Performed by: INTERNAL MEDICINE

## 2020-12-02 PROCEDURE — 3017F COLORECTAL CA SCREEN DOC REV: CPT | Performed by: INTERNAL MEDICINE

## 2020-12-02 PROCEDURE — 4040F PNEUMOC VAC/ADMIN/RCVD: CPT | Performed by: INTERNAL MEDICINE

## 2020-12-02 PROCEDURE — G8484 FLU IMMUNIZE NO ADMIN: HCPCS | Performed by: INTERNAL MEDICINE

## 2020-12-02 PROCEDURE — G8417 CALC BMI ABV UP PARAM F/U: HCPCS | Performed by: INTERNAL MEDICINE

## 2020-12-02 PROCEDURE — 1123F ACP DISCUSS/DSCN MKR DOCD: CPT | Performed by: INTERNAL MEDICINE

## 2021-02-25 ENCOUNTER — OFFICE VISIT (OUTPATIENT)
Dept: PRIMARY CARE CLINIC | Age: 71
End: 2021-02-25
Payer: MEDICARE

## 2021-02-25 DIAGNOSIS — Z20.828 EXPOSURE TO SARS-ASSOCIATED CORONAVIRUS: Primary | ICD-10-CM

## 2021-02-25 PROCEDURE — G8417 CALC BMI ABV UP PARAM F/U: HCPCS | Performed by: NURSE PRACTITIONER

## 2021-02-25 PROCEDURE — 99211 OFF/OP EST MAY X REQ PHY/QHP: CPT | Performed by: NURSE PRACTITIONER

## 2021-02-25 PROCEDURE — G8428 CUR MEDS NOT DOCUMENT: HCPCS | Performed by: NURSE PRACTITIONER

## 2021-02-25 NOTE — PATIENT INSTRUCTIONS
You have received a viral test for COVID-19. Below is education on quarantine per the CDC guidelines. For any symptoms, seek care from your PCP, call [phone] to establish care with a doctor, or go directly to an urgent care or the emergency room. Test results will take 2-7 days and will be sent to you in your [employer] account. If you test positive, you will be contacted via phone. If you test negative, the ONLY communication will be through [address] GO TO [employer] AND SIGN UP FOR [employer]  (LOWER LEFT OF THE HOME PAGE)  No test is 100%. If you have symptoms, you should follow the guidance of quarantine as previously stated. You can still be contagious if you have symptoms. Your [facility] will reach out to you if you have a positive result. They will provide you with a return to work date and note. If you were tested for a pre-op, then you should remain in quarantine until your procedure. How do I know if I need to be in quarantine? If you live in a community where COVID-19 is or might be spreading (currently, that is virtually everywhere in the [place])  Be alert for symptoms. Watch for fever, cough, shortness of breath, or other symptoms of COVID-19.  ? Take your temperature if symptoms develop. ? Practice social distancing. Maintain 6 feet of distance from others and stay out of crowded places. ? Follow CDC guidance if symptoms develop. If you feel healthy but:  ? Recently had close contact with a person with COVID-19 you need to Quarantine:  ? Stay home until 14 days after your last exposure. ? Check your temperature twice a day and watch for symptoms of COVID-19.  ? If possible, stay away from people who are at higher-risk for getting very sick from COVID-19. Stay Home and Monitor Your Health if you:  ? Have been diagnosed with COVID-19, or  ? Are waiting for test results, or  ?  Have cough, fever, or shortness of breath, or symptoms of COVID-19 When You Can be Around Others After You Had or Likely Had COVID-19     If you have or think you might have COVID-19, it is important to stay home and away from other people. Staying away from others helps stop the spread of COVID-19. If you have an emergency warning sign (including trouble breathing), get emergency medical care immediately. When you can be around others (end home isolation) depends on different factors for different situations. Find CDC's recommendations for your situation below. I think or know I had COVID-19, and I had symptoms  You can be with others after  ? 3 days with no fever and  ? Respiratory symptoms have improved (e.g. cough, shortness of breath) and  ? 10 days since symptoms first appeared  Depending on your healthcare provider's advice and availability of testing, you might get tested to see if you still have COVID-19. If you will be tested, you can be around others when you have no fever, respiratory symptoms have improved, and you receive two negative test results in a row, at least 24 hours apart. I tested positive for COVID-19 but had no symptoms  If you continue to have no symptoms, you can be with others after:  ? 10 days have passed since test or 14 days since your exposure test   Depending on your healthcare provider's advice and availability of testing, you might get tested to see if you still have COVID-19. If you will be tested, you can be around others after you receive two negative test results in a row, at least 24 hours apart. If you develop symptoms after testing positive, follow the guidance above for I think or know I had COVID, and I had symptoms.   For Anyone Who Has Been Around a Person with COVID-19  It is important to remember that anyone who has close contact with someone with COVID-19 should stay home for 14 days after exposure based on the time it takes to develop illness. Testing is not necessary.     www.cdc.gov/coronavirus/2019-ncov/index.html

## 2021-02-26 LAB — SARS-COV-2: NOT DETECTED

## 2021-03-01 RX ORDER — POTASSIUM CHLORIDE 20 MEQ/1
20 TABLET, EXTENDED RELEASE ORAL DAILY
COMMUNITY
Start: 2020-11-13 | End: 2022-02-18 | Stop reason: ALTCHOICE

## 2021-03-01 NOTE — PROGRESS NOTES
[address] [facility] time___0800_________        Surgery time___0930_______    Take the following medications with a sip of water: Follow your MD/Surgeons pre-procedure instructions regarding your medications    Do not eat or drink anything after 12:00 midnight prior to your surgery. This includes water chewing gum, mints and ice chips. You may brush your teeth and gargle the morning of your surgery, but do not swallow the water     Please see your family doctor/pediatrician for a history and physical and/or concerning medications. Bring any test results/reports from your physicians office. If you are under the care of a heart doctor or specialist doctor, please be aware that you may be asked to them for clearance    You may be asked to stop blood thinners such as Coumadin, Plavix, Fragmin, Lovenox, etc., or any anti-inflammatories such as:  Aspirin, Ibuprofen, Advil, Naproxen prior to your surgery. We also ask that you stop any OTC medications such as fish oil, vitamin E, glucosamine, garlic, Multivitamins, COQ 10, etc. May take tylenol    We ask that you do not smoke 24 hours prior to surgery  We ask that you do not  drink any alcoholic beverages 24 hours prior to surgery     You must make arrangements for a responsible adult to take you home after your surgery. For your safety you will not be allowed to leave alone or drive yourself home. Your surgery will be cancelled if you do not have a ride home. Also for your safety, it is strongly suggested that someone stay with you the first 24 hours after your surgery. A parent or legal guardian must accompany a child scheduled for surgery and plan to stay at the hospital until the child is discharged. Please do not bring other children with you. For your comfort, please wear simple loose fitting clothing to the hospital.  Please do not bring valuables.     Do not wear any make-up or nail polish on your specific instructions according to your surgery.

## 2021-03-01 NOTE — PROGRESS NOTES
Preoperative Screening for Elective Surgery/Invasive Procedures While COVID-19 present in the community     Have you tested positive or have been told to self-isolate for COVID-19 like symptoms within the past 28 days? no   Do you currently have any of the following symptoms? no  o Fever >100.0 F or 99.9 F in immunocompromised patients? no  o New onset cough, shortness of breath or difficulty breathing? no  o New onset sore throat, myalgia (muscle aches and pains), headache, loss of taste/smell or diarrhea? no   Have you had a potential exposure to COVID-19 within the past 14 days by: no  o Close contact with a confirmed case? no  o Close contact with a healthcare worker,  or essential infrastructure worker (grocery store, [employer], gas station, public utilities or transportation)? yes  o Do you reside in a congregate setting such as; skilled nursing facility, adult home, correctional facility, homeless shelter or other institutional setting? no  o Have you had recent travel to a known COVID-19 hotspot? no    Indicate if the patient has a positive screen by answering yes to one or more of the above questions. Patients who test positive or screen positive prior to surgery or on the day of surgery should be evaluated in conjunction with the surgeon/proceduralist/anesthesiologist to determine the urgency of the procedure.

## 2021-03-01 NOTE — PROGRESS NOTES
[address] [facility] time__0800__________        Surgery time__0930__________    Take the following medications with a sip of water: Follow your MD/Surgeons pre-procedure instructions regarding your medications    Do not eat or drink anything after 12:00 midnight prior to your surgery. This includes water chewing gum, mints and ice chips. You may brush your teeth and gargle the morning of your surgery, but do not swallow the water     Please see your family doctor/pediatrician for a history and physical and/or concerning medications. Bring any test results/reports from your physicians office. If you are under the care of a heart doctor or specialist doctor, please be aware that you may be asked to them for clearance    You may be asked to stop blood thinners such as Coumadin, Plavix, Fragmin, Lovenox, etc., or any anti-inflammatories such as:  Aspirin, Ibuprofen, Advil, Naproxen prior to your surgery. We also ask that you stop any OTC medications such as fish oil, vitamin E, glucosamine, garlic, Multivitamins, COQ 10, etc. May take tylenol    We ask that you do not smoke 24 hours prior to surgery  We ask that you do not  drink any alcoholic beverages 24 hours prior to surgery     You must make arrangements for a responsible adult to take you home after your surgery. For your safety you will not be allowed to leave alone or drive yourself home. Your surgery will be cancelled if you do not have a ride home. Also for your safety, it is strongly suggested that someone stay with you the first 24 hours after your surgery. A parent or legal guardian must accompany a child scheduled for surgery and plan to stay at the hospital until the child is discharged. Please do not bring other children with you. For your comfort, please wear simple loose fitting clothing to the hospital.  Please do not bring valuables. Do not wear any make-up or nail polish on your fingers or toes      For your safety, please do not wear any jewelry or body piercing's on the day of surgery. All jewelry must be removed. If you have dentures, they will be removed before going to operating room. For your convenience, we will provide you with a container. If you wear contact lenses or glasses, they will be removed, please bring a case for them. If you have a living will and a durable power of  for healthcare, please bring in a copy. As part of our patient safety program to minimize surgical site infections, we ask you to do the following:    · Please notify your surgeon if you develop any illness between         now and the  day of your surgery. · This includes a cough, cold, fever, sore throat, nausea,         or vomiting, and diarrhea, etc.  ·  Please notify your surgeon if you experience dizziness, shortness         of breath or blurred vision between now and the time of your surgery. Do not shave your operative site 96 hours prior to surgery. For face and neck surgery, men may use an electric razor 48 hours   prior to surgery. You may shower the night before surgery or the morning of   your surgery with an antibacterial soap. You will need to bring a photo ID and insurance card    [facility] has an onsite pharmacy, would you like to utilize our pharmacy     If you will be staying overnight and use a C-pap machine, please bring   your C-pap to hospital     Our goal is to provide you with excellent care, therefore, visitors will be limited to two(2) in the room at a time so that we may focus on providing this care for you. Please contact pre-admission testing if you have any further questions.                  [facility] phone number:  [address] [facility] fax number:  [phone] Please note these are generalized instructions for all surgical cases, you may be provided with more specific instructions according to your surgery. [facility]    PREOPERATIVE INSTRUCTIONS    Arrival time ____________        Surgery time____________    Etelvina Morales do not need to see your primary care physician (PCP) for a history and physical prior to your surgery. If you are having heart surgery, stop taking your ACE (e.g. lisinopril, enalapril, ramipril, benazepril) or ARB (e.g. candesartan, losartan, olmesartan, valsartan) 1 day (24 hours) prior to your surgery. If you are having heart or lung surgery, stop taking your Aspirin 1 day (24 hours) prior to your surgery. You will need to stop blood thinners such as: clopidogrel (Plavix), ticagrelor (Brilinta), prasugrel (Effient), warfarin (Coumadin), dabigatran (Pradaxa), rivaroxaban (Xarelto), apixaban (Eliquis), edoxaban (Savaysa), and enoxaparin (Lovenox). If you have not been given instructions, please contact the cardiac surgeons' office at [phone]. Stop all over the counter blood thinners such as: ibuprofen, Advil, naproxen 1 day (24 hours) prior to your surgery. We also ask that you stop any over the counter medications such as fish oil, vitamin E, glucosamine, and garlic 1 day (24 hours). You may use your inhaler and take any seizure medication the morning (day) of your surgery. If you take sildenafil (Viagra, Revatio), tadalafil (Cialis, Adcirca), vardenafil (Levitra, Staxyn), or avanafil Kathrynilya Jacinto), please contact the cardiac surgeons' office at [phone] to discuss when these will need to be stopped prior to surgery. If you are diabetic and take long-acting insulin, take half of your usual dose the night or evening prior to your surgery. Take no insulin the morning of your surgery. If you are diabetic and take oral medication, do not take these medications the evening prior to or the morning of your surgery. Take only the following medications the morning of surgery with a sip of water: Beta Blocker (e.g.  metoprolol, carvedilol or atenolol) and/or rate or rhythm controlling heart medications (e.g. diltiazem and amiodarone). Do not eat or drink anything after 12:00 midnight prior to your surgery. This includes water, chewing gum, mints and ice chips. You may brush your teeth and gargle the morning of your surgery, but do not swallow the water. Please do not smoke 24 hours prior to surgery    Please do not drink any alcoholic beverages or chamomile tea 24 hours prior to surgery    For your comfort, please wear simple loose fitting clothing to the hospital.  Please do not bring valuables. Do not wear any make-up or nail polish on your fingers or toes    For your safety, please do not wear any jewelry or body piercings on the day of surgery. All jewelry must be removed. If you have dentures, they will be removed before going to operating room. For your convenience, we will provide you with a container. If you wear contact lenses or glasses, they will be removed, please bring a case for them. If you have a living will and a durable power of  for healthcare, please bring in a copy. As part of our patient safety program to minimize surgical site infections, we ask you to do the following:    · Please notify your surgeon if you develop any illness between now and the day of your surgery. This includes a cough, cold, fever, sore throat, nausea, or vomiting, and diarrhea, etc.  · Please notify your surgeon if you experience dizziness, shortness of breath or blurred vision between now and the time of your surgery. Do not shave your chest, legs, or arms for 96 hours prior to surgery. An electric razor may be used on your neck and face for up to 48 hours prior to surgery. Shower the night before surgery with the bottle of Hibiclens that has been provided. You will need to bring a photo ID and insurance card    If you use a C-pap machine, please bring your C-pap machine with you to the hospital the day of surgery. Please contact the surgeon's office at [phone] if you have any further questions regarding your surgery. Remember. Chanelle Block Safety First! Call before you Fall Remember

## 2021-03-02 ENCOUNTER — ANESTHESIA EVENT (OUTPATIENT)
Dept: ENDOSCOPY | Age: 71
End: 2021-03-02
Payer: MEDICARE

## 2021-03-03 ENCOUNTER — HOSPITAL ENCOUNTER (OUTPATIENT)
Age: 71
Setting detail: OUTPATIENT SURGERY
Discharge: HOME | End: 2021-03-03
Attending: INTERNAL MEDICINE | Admitting: INTERNAL MEDICINE
Payer: MEDICARE

## 2021-03-03 ENCOUNTER — ANESTHESIA (OUTPATIENT)
Dept: ENDOSCOPY | Age: 71
End: 2021-03-03
Payer: MEDICARE

## 2021-03-03 VITALS
WEIGHT: 245.26 LBS | HEART RATE: 60 BPM | SYSTOLIC BLOOD PRESSURE: 120 MMHG | HEIGHT: 74 IN | BODY MASS INDEX: 31.48 KG/M2 | TEMPERATURE: 97.4 F | DIASTOLIC BLOOD PRESSURE: 71 MMHG | OXYGEN SATURATION: 100 % | RESPIRATION RATE: 16 BRPM

## 2021-03-03 VITALS
OXYGEN SATURATION: 99 % | RESPIRATION RATE: 16 BRPM | DIASTOLIC BLOOD PRESSURE: 78 MMHG | SYSTOLIC BLOOD PRESSURE: 171 MMHG

## 2021-03-03 LAB
GLUCOSE BLD-MCNC: 116 MG/DL (ref 70–99)
GLUCOSE BLD-MCNC: 130 MG/DL (ref 70–99)
PERFORMED ON: ABNORMAL
PERFORMED ON: ABNORMAL

## 2021-03-03 PROCEDURE — 3609017100 HC EGD: Performed by: INTERNAL MEDICINE

## 2021-03-03 PROCEDURE — 3700000000 HC ANESTHESIA ATTENDED CARE: Performed by: INTERNAL MEDICINE

## 2021-03-03 PROCEDURE — 7100000011 HC PHASE II RECOVERY - ADDTL 15 MIN: Performed by: INTERNAL MEDICINE

## 2021-03-03 PROCEDURE — 2580000003 HC RX 258: Performed by: ANESTHESIOLOGY

## 2021-03-03 PROCEDURE — 6360000002 HC RX W HCPCS: Performed by: NURSE ANESTHETIST, CERTIFIED REGISTERED

## 2021-03-03 PROCEDURE — 2709999900 HC NON-CHARGEABLE SUPPLY: Performed by: INTERNAL MEDICINE

## 2021-03-03 PROCEDURE — 7100000010 HC PHASE II RECOVERY - FIRST 15 MIN: Performed by: INTERNAL MEDICINE

## 2021-03-03 PROCEDURE — 7100000000 HC PACU RECOVERY - FIRST 15 MIN: Performed by: INTERNAL MEDICINE

## 2021-03-03 PROCEDURE — 7100000001 HC PACU RECOVERY - ADDTL 15 MIN: Performed by: INTERNAL MEDICINE

## 2021-03-03 RX ORDER — SODIUM CHLORIDE 0.9 % (FLUSH) 0.9 %
10 SYRINGE (ML) INJECTION EVERY 12 HOURS SCHEDULED
Status: DISCONTINUED | OUTPATIENT
Start: 2021-03-03 | End: 2021-03-03 | Stop reason: HOSPADM

## 2021-03-03 RX ORDER — SODIUM CHLORIDE 9 MG/ML
INJECTION, SOLUTION INTRAVENOUS CONTINUOUS
Status: DISCONTINUED | OUTPATIENT
Start: 2021-03-03 | End: 2021-03-03 | Stop reason: HOSPADM

## 2021-03-03 RX ORDER — SODIUM CHLORIDE 0.9 % (FLUSH) 0.9 %
10 SYRINGE (ML) INJECTION PRN
Status: DISCONTINUED | OUTPATIENT
Start: 2021-03-03 | End: 2021-03-03 | Stop reason: HOSPADM

## 2021-03-03 RX ORDER — ONDANSETRON 2 MG/ML
4 INJECTION INTRAMUSCULAR; INTRAVENOUS
Status: DISCONTINUED | OUTPATIENT
Start: 2021-03-03 | End: 2021-03-03 | Stop reason: HOSPADM

## 2021-03-03 RX ORDER — PROPOFOL 10 MG/ML
INJECTION, EMULSION INTRAVENOUS PRN
Status: DISCONTINUED | OUTPATIENT
Start: 2021-03-03 | End: 2021-03-03 | Stop reason: SDUPTHER

## 2021-03-03 RX ADMIN — SODIUM CHLORIDE: 9 INJECTION, SOLUTION INTRAVENOUS at 08:07

## 2021-03-03 RX ADMIN — PROPOFOL 50 MG: 10 INJECTION, EMULSION INTRAVENOUS at 09:11

## 2021-03-03 RX ADMIN — PROPOFOL 100 MG: 10 INJECTION, EMULSION INTRAVENOUS at 09:08

## 2021-03-03 ASSESSMENT — PULMONARY FUNCTION TESTS
PIF_VALUE: 1
PIF_VALUE: 0

## 2021-03-03 ASSESSMENT — PAIN SCALES - GENERAL
PAINLEVEL_OUTOF10: 0
PAINLEVEL_OUTOF10: 0

## 2021-03-03 ASSESSMENT — LIFESTYLE VARIABLES: SMOKING_STATUS: 0

## 2021-03-03 NOTE — ANESTHESIA PRE PROCEDURE
needed 4/1/19   Historical Provider, MD       Current medications:    Current Facility-Administered Medications   Medication Dose Route Frequency Provider Last Rate Last Admin    0.9 % sodium chloride infusion   Intravenous Continuous Adilene Chávez MD        sodium chloride flush 0.9 % injection 10 mL  10 mL Intravenous 2 times per day Adilene Chávez MD        sodium chloride flush 0.9 % injection 10 mL  10 mL Intravenous PRN Adilene Chávez MD           Allergies:    No Known Allergies    Problem List:    Patient Active Problem List   Diagnosis Code    Hypertension I10    Cyst of joint of hand M25.849    High triglycerides E78.1    Dupuytren's contracture of right hand M72.0    Rosacea L71.9    Thrombocytopenia (HCC) D69.6    Chronic ITP (idiopathic thrombocytopenia) (HCC) D69.3    Iron deficiency anemia D50.9    Anemia, iron deficiency D50.9    Chest pain R07.9    Non-STEMI (non-ST elevated myocardial infarction) (HCC) I21.4    CAD (coronary artery disease) I25.10    Hyperlipidemia E78.5    Incarcerated ventral hernia K43.6       Past Medical History:        Diagnosis Date    Arthritis     CAD (coronary artery disease)     History of blood transfusion 2018    Hyperlipidemia     Hypertension     MARIE (nonalcoholic steatohepatitis)     Osteoarthritis     Spinal stenosis     Thrombocytopenia (HCC)     Type 2 diabetes mellitus without complication (HCC)     Type II or unspecified type diabetes mellitus without mention of complication, not stated as uncontrolled     type 2    Wears glasses        Past Surgical History:        Procedure Laterality Date    BACK SURGERY  11/25/2016    L3, L4,L5 remove spurs    BACK SURGERY  2020    L2, L3 released pressure from nerve    COLONOSCOPY  12/04/2018    wiht polypectomy x3    COLONOSCOPY N/A 12/4/2018    COLONOSCOPY POLYPECTOMY SNARE/COLD BIOPSY performed by Mariela Mckeon MD at [facility] 12/04/2018    x2 stents-70% and 85% blocked    CYST REMOVAL Right     on arm    HAND SURGERY Right 2013    dupuytren syndrome    INGUINAL HERNIA REPAIR Left 12/11/15    laparoscopic    UPPER GASTROINTESTINAL ENDOSCOPY  12/04/2018    with biopsy    UPPER GASTROINTESTINAL ENDOSCOPY N/A 12/4/2018    EGD BIOPSY performed by Araceli Deng MD at [address] [place] [address] N/A 3/6/2019    EGD BAND LIGATION performed by Yarely Brooke MD at [address] [place] [address] [address] N/A 4/3/2019    ESOPHAGOGASTRODUODENOSCOPY WITH BANDING performed by Yarely Brooke MD at [address] [place] [address] [facility] N/A 6/5/2019    ESOPHAGOGASTRODUODENOSCOPY performed by Yarely Brooke MD at [address] [place] [address] N/A 12/18/2019    EGD ESOPHAGOGASTRODUODENOSCOPY performed by Yarely Brooke MD at [address] [place] [address] [facility] N/A 9/2/2020    EGD BIOPSY performed by Yarely Brooke MD at [address] [facility] N/A 11/18/2019    OPEN REPAIR OF INCARCERATED VENTRAL HERNIA WITH MESH performed by MD Ishaan at [address] [place] [address] History:    Social History     Tobacco Use    Smoking status: Never Smoker    Smokeless tobacco: Never Used   Substance Use Topics    Alcohol use: Not Currently     Alcohol/week: 0.0 standard drinks     Comment: quit drinking 1990's                                Counseling given: Not Answered      Vital Signs (Current):   Vitals:    03/01/21 0914 03/03/21 0755   BP:  (!) 141/63   Pulse:  68   Resp:  15   Temp:  97.6 °F (36.4 °C)   TempSrc:  Temporal   SpO2:  99%   Weight: 245 lb (111.1 kg) 245 lb 4.2 oz (111.3 kg)   Height: 6' 2\" (1.88 m) 6' 2\" (1.88 m)                                              BP Readings from Last 3 Encounters:   03/03/21 (!) 141/63   12/02/20 128/64   09/02/20 135/64       NPO Status: Time of last liquid consumption: 2000 Time of last solid consumption: 2000                        Date of last liquid consumption: 03/02/21                        Date of last solid food consumption: 03/02/21    BMI:   Wt Readings from Last 3 Encounters:   03/03/21 245 lb 4.2 oz (111.3 kg)   12/02/20 250 lb (113.4 kg)   09/02/20 245 lb 0.7 oz (111.1 kg)     Body mass index is 31.49 kg/m². CBC:   Lab Results   Component Value Date    WBC 3.9 11/14/2020    RBC 4.10 11/18/2019    RBC 4.03 11/23/2016    HGB 12.7 11/14/2020    HCT 36.3 11/14/2020    MCV 88.9 11/14/2020    RDW 14.5 11/14/2020    PLT 72 11/14/2020       CMP:   Lab Results   Component Value Date     11/14/2020    K 4.2 11/14/2020    K 3.8 12/06/2018    CL 99 11/14/2020    CO2 26 11/14/2020    BUN 14 11/14/2020    CREATININE 1.00 11/14/2020    GFRAA 89 11/14/2020    GFRAA >60 03/08/2013    AGRATIO 1.4 10/26/2019    LABGLOM 73 11/14/2020    GLUCOSE 130 11/14/2020    PROT 6.8 10/26/2019    PROT 7.5 03/08/2013    CALCIUM 9.6 11/14/2020    BILITOT 1.7 10/26/2019    ALKPHOS 86 10/26/2019    AST 33 11/14/2020    ALT 33 11/14/2020       POC Tests:   No results for input(s): POCGLU, POCNA, POCK, POCCL, POCBUN, POCHEMO, POCHCT in the last 72 hours.     Coags:   Lab Results   Component Value Date    PROTIME 13.0 11/30/2018    INR 1.14 11/30/2018       HCG (If Applicable): No results found for: PREGTESTUR, PREGSERUM, HCG, HCGQUANT     ABGs:   Lab Results   Component Value Date    PHART 7.219 12/11/2015    PO2ART 414 12/11/2015    YNE6ETY 68 12/11/2015    BUI8FRI 27.8 12/11/2015    BEART 0 12/11/2015    Q2PMKKRJ 100 12/11/2015        Type & Screen (If Applicable):  No results found for: LABABO, [address]    Anesthesia Evaluation  Patient summary reviewed no history of anesthetic complications:   Airway: Mallampati: II  TM distance: >3 FB   Neck ROM: full  Mouth opening: > = 3 FB Dental:          Pulmonary:Negative Pulmonary ROS breath sounds clear to auscultation      (-) COPD, asthma,

## 2021-03-03 NOTE — PROCEDURES
[address]                                 PROCEDURE NOTE    PATIENT NAME: Liliana Inman                   :        1950  MED REC NO:   [id number]                          ROOM:  ACCOUNT NO:   [de-identified]                           ADMIT DATE: 2021  PROVIDER:     Lashay Connors MD    EGD    DATE OF PROCEDURE:  2021    REFERRING PROVIDER:  Kathlyne Sicard, MD    PATIENT HISTORY:  A 18-year-old male, outpatient. INSTRUMENT USED:  Olympus GIF-Q180. MEDICATIONS OF PROCEDURE:  The patient was premedicated with Diprivan  intravenously as administered by the anesthesiology service. INDICATIONS:  The patient has cirrhosis and esophageal varices, which  have been banded previously. He presents for a follow-up EGD to  reassess the varices to see if additional banding may be required. PROCEDURE:  The endoscope was inserted into the esophagus without  difficulty. The esophageal mucosa itself revealed no evidence of  inflammatory or metaplastic change. The Z-line was located at 46 cm. Once again noted were small distal esophageal varices that did not  require banding. More prominent varices were noted in the mid  esophagus. In the stomach, there were early changes of portal  hypertensive gastropathy. Once again noted were nodules throughout the  antrum, which had been biopsied previously. Biopsies have simply revealed  foveolar hyperplasia. The duodenal bulb and descending duodenum were  normal.    ESTIMATED BLOOD LOSS:  None. IMPRESSION:  1. Small distal esophageal varices that did not require banding. 2.  More prominent midesophageal varices. 3.  Early portal hypertensive gastropathy. 4.  Persistent gastric antral nodules evaluated previously. 5.  No gastric varices. PLAN:  I will recommend a follow-up EGD again in six months. KEITH Hubbard MD D: 03/03/2021 10:19:24       T: 03/03/2021 11:39:50     [id number]  Job#: [id number]     Doc#: [id number]    CC:  Chalino King MD

## 2021-03-03 NOTE — H&P
[facility]   Pre-operative History and Physical    Patient: Jayne Schulte  : 1950  Acct#:         HISTORY OF PRESENT ILLNESS:    The patient is a 65 y.o. male  who presents with esophageal varices; cirrhosis  Past Medical History:        Diagnosis Date    Arthritis     CAD (coronary artery disease)     History of blood transfusion     Hyperlipidemia     Hypertension     MARIE (nonalcoholic steatohepatitis)     Osteoarthritis     Spinal stenosis     Thrombocytopenia (HCC)     Type 2 diabetes mellitus without complication ([address]     Type II or unspecified type diabetes mellitus without mention of complication, not stated as uncontrolled     type 2    Wears glasses      Past Surgical History:        Procedure Laterality Date    BACK SURGERY  2016    L3, L4,L5 remove spurs    BACK SURGERY      L2, L3 released pressure from nerve    COLONOSCOPY  2018    wiht polypectomy x3    COLONOSCOPY N/A 2018    COLONOSCOPY POLYPECTOMY SNARE/COLD BIOPSY performed by Kyleigh Waddell MD at [facility]  12/04/2018    x2 stents-70% and 85% blocked    CYST REMOVAL Right     on arm    HAND SURGERY Right     dupuytren syndrome    INGUINAL HERNIA REPAIR Left 12/11/15    laparoscopic    UPPER GASTROINTESTINAL ENDOSCOPY  2018    with biopsy    UPPER GASTROINTESTINAL ENDOSCOPY N/A 2018    EGD BIOPSY performed by Kyleigh Waddell MD at [address] [place] [address] N/A 3/6/2019    EGD BAND LIGATION performed by Tawana Muller MD at [address] [place] [address] N/A 4/3/2019    ESOPHAGOGASTRODUODENOSCOPY WITH BANDING performed by Tawana Muller MD at [address] [place] [address] N/A 2019    ESOPHAGOGASTRODUODENOSCOPY performed by Tawana Muller MD at [address] [place] [address] 2019    EGD ESOPHAGOGASTRODUODENOSCOPY performed by Stan Morin MD at [address] N/A 9/2/2020    EGD BIOPSY performed by Stan Morin MD at [facility] N/A 11/18/2019    OPEN REPAIR OF INCARCERATED VENTRAL HERNIA WITH MESH performed by Collin Contreras MD at [address]     Medications prior to admission:   Prior to Admission medications    Medication Sig Start Date End Date Taking? Authorizing Provider   potassium chloride (KLOR-CON M) 20 MEQ extended release tablet Take 20 mEq by mouth daily 11/13/20  Yes Historical Provider, MD   lansoprazole (PREVACID) 30 MG delayed release capsule Take 30 mg by mouth daily   Yes Historical Provider, MD   aspirin 81 MG tablet Take 1 tablet by mouth daily 4/22/19  Yes Keyanna Abrams MD   atorvastatin (LIPITOR) 40 MG tablet Take 1 tablet by mouth nightly 1/30/19  Yes PATRICK Moore - CNP   glipiZIDE (GLUCOTROL) 10 MG tablet Take 10 mg by mouth 2 times daily (before meals)   Yes Historical Provider, MD   metFORMIN (GLUCOPHAGE) 1000 MG tablet Take 1 tablet by mouth 2 times daily (with meals) 12/7/18  Yes Fredy Marte MD   nadolol (CORGARD) 80 MG tablet Take 1 tablet by mouth daily  Patient taking differently: Take 40 mg by mouth daily 40 mg daily 12/6/18  Yes Fredy Marte MD   ferrous sulfate 325 (65 Fe) MG tablet Take 325 mg by mouth 2 times daily    Yes Historical Provider, MD   losartan-hydrochlorothiazide (HYZAAR) 100-25 MG per tablet Take 1 tablet by mouth daily  Patient taking differently: Take 1 tablet by mouth daily inafternoon 9/6/17  Yes Anthony Kong MD   Multiple Vitamins-Minerals (MULTIVITAMIN PO) Take 1 tablet by mouth daily   Yes Historical Provider, MD   CINNAMON PO Take 1,000 mg by mouth 2 times daily   Yes Historical Provider, MD   glucose blood VI test strips (FREESTYLE LITE) strip Test blood sugar once daily.  10/6/14  Yes Anthony Kong MD   methocarbamol (ROBAXIN) 750 MG tablet Take 750 mg by mouth every 8 hours as needed 4/1/19   Historical Provider, MD     Allergies:    Patient has no known allergies.   Social History:   Social History     Socioeconomic History    Marital status:      Spouse name: Kay Birch Number of children: Not on file    Years of education: 12    Highest education level: Not on file   Occupational History    Not on file   Social Needs    Financial resource strain: Not on file    Food insecurity     Worry: Not on file     Inability: Not on file   [employer] needs     Medical: Not on file     Non-medical: Not on file   Tobacco Use    Smoking status: Never Smoker    Smokeless tobacco: Never Used   Substance and Sexual Activity    Alcohol use: Not Currently     Alcohol/week: 0.0 standard drinks     Comment: quit drinking 1990's    Drug use: Never    Sexual activity: Yes     Partners: Female   Lifestyle    Physical activity     Days per week: Not on file     Minutes per session: Not on file    Stress: Not on file   Relationships    Social connections     Talks on phone: Not on file     Gets together: Not on file     Attends Confucianist service: Not on file     Active member of club or organization: Not on file     Attends meetings of clubs or organizations: Not on file     Relationship status: Not on file    Intimate partner violence     Fear of current or ex partner: Not on file     Emotionally abused: Not on file     Physically abused: Not on file     Forced sexual activity: Not on file   Other Topics Concern    Not on file   Social History Narrative    Not on file           Family History:   Family History   Problem Relation Age of Onset    Kidney Disease Mother     Heart Disease Father     Diabetes Father     No Known Problems Brother     Diabetes Brother          PHYSICAL EXAM:      BP (!) 141/63   Pulse 68   Temp 97.6 °F (36.4 °C) (Temporal)   Resp 15   Ht 6' 2\" (1.88 m)   Wt 245 lb 4.2 oz (111.3 kg) SpO2 99%   BMI 31.49 kg/m²  I        Heart: Normal    Lungs: Normal    Abdomen: Normal      ASA Grade: ASA 3 - Patient with moderate systemic disease with functional limitations    II (soft palate, uvula, fauces visible)  ASSESSMENT AND PLAN:    1. Patient is a 65 y.o. male here for EGD  2. Procedure options, risks and benefits reviewed with patient who expresses understanding.

## 2021-03-03 NOTE — OP NOTE
Operative Note      Patient: Merrick Piper  YOB: 1950  MRN: [id number]    Date of Procedure: 3/3/2021    Pre-Op Diagnosis: ESOPHAGEAL VARICES, CIRRHOSIS    Post-Op Diagnosis: Same       Procedure(s):  ESOPHAGOGASTRODUODENOSCOPY    Surgeon(s):  Duncan Ladd MD    Assistant:   * No surgical staff found *    Anesthesia: Monitor Anesthesia Care    Estimated Blood Loss (mL): None    Complications: None    Specimens:   * No specimens in log *    Implants:  * No implants in log *      Drains: * No LDAs found *    Findings: See dictated report    Detailed Description of Procedure:   See dictated report    Electronically signed by Duncan Ladd MD on 3/3/2021 at 9:15 AM

## 2021-03-03 NOTE — PROGRESS NOTES
Alert and oriented. No c/o. Vss. abd soft. Tolerated sitting up and po fluids and crackers well. Wife at bedside. Verbalized understanding of discharge instructions.

## 2021-03-03 NOTE — BRIEF OP NOTE
Brief Postoperative Note    Luis Inman  YOB: 1950  [id number]    Pre-operative Diagnosis: Esophageal varices; cirrhosis    Post-operative Diagnosis: Same    Procedure: EGD    Anesthesia: MAC    Surgeons/Assistants: Emmett Thomas MD    Estimated Blood Loss: None    Complications: None    Specimens: Was Not Obtained    Findings: See dictated report    Electronically signed by Emmett Thomas MD on 3/3/2021 at 9:14 AM

## 2021-03-03 NOTE — PROGRESS NOTES
Pt to PACu. Fully awake, oriented x4. VSS. Denies pain and nausea. ABD soft/rounded/non-distended. IV site WDL.

## 2021-05-25 ENCOUNTER — TELEPHONE (OUTPATIENT)
Dept: CARDIOLOGY CLINIC | Age: 71
End: 2021-05-25

## 2021-05-25 NOTE — TELEPHONE ENCOUNTER
Wife asking that this patients lab orders be faxed to [facility] lab in [place] . He has an appt next week to go over these results .

## 2021-06-04 ENCOUNTER — OFFICE VISIT (OUTPATIENT)
Dept: CARDIOLOGY CLINIC | Age: 71
End: 2021-06-04
Payer: MEDICARE

## 2021-06-04 VITALS
HEART RATE: 66 BPM | DIASTOLIC BLOOD PRESSURE: 74 MMHG | OXYGEN SATURATION: 100 % | WEIGHT: 248.8 LBS | HEIGHT: 74 IN | SYSTOLIC BLOOD PRESSURE: 146 MMHG | BODY MASS INDEX: 31.93 KG/M2

## 2021-06-04 DIAGNOSIS — E78.49 OTHER HYPERLIPIDEMIA: ICD-10-CM

## 2021-06-04 DIAGNOSIS — I10 ESSENTIAL HYPERTENSION: ICD-10-CM

## 2021-06-04 DIAGNOSIS — I25.10 CORONARY ARTERY DISEASE INVOLVING NATIVE CORONARY ARTERY OF NATIVE HEART WITHOUT ANGINA PECTORIS: Primary | ICD-10-CM

## 2021-06-04 PROCEDURE — 99214 OFFICE O/P EST MOD 30 MIN: CPT | Performed by: INTERNAL MEDICINE

## 2021-06-04 PROCEDURE — 3017F COLORECTAL CA SCREEN DOC REV: CPT | Performed by: INTERNAL MEDICINE

## 2021-06-04 PROCEDURE — G8417 CALC BMI ABV UP PARAM F/U: HCPCS | Performed by: INTERNAL MEDICINE

## 2021-06-04 PROCEDURE — 1036F TOBACCO NON-USER: CPT | Performed by: INTERNAL MEDICINE

## 2021-06-04 PROCEDURE — G8427 DOCREV CUR MEDS BY ELIG CLIN: HCPCS | Performed by: INTERNAL MEDICINE

## 2021-06-04 PROCEDURE — 1123F ACP DISCUSS/DSCN MKR DOCD: CPT | Performed by: INTERNAL MEDICINE

## 2021-06-04 PROCEDURE — 4040F PNEUMOC VAC/ADMIN/RCVD: CPT | Performed by: INTERNAL MEDICINE

## 2021-06-04 NOTE — PROGRESS NOTES
[facility]   Cardiac Follow up     Referring Provider:  Levi Foss     Chief Complaint   Patient presents with    6 Month Follow-Up     no cardiac complaints at this time     Coronary Artery Disease      History of Present Illness:  Chasity Pisano a history of NSTEMI / CAD s/p PTCA RCA (12/2018), hypertension, and hyperlipidemia. His other history includes: JOSE LUIS, esophageal varices, mild portal hypertensive gastropathy / portal HTN with cirrhosis and ITP. Stress echo 11/2019 was stable. Today, he is here for regular follow up. He states he feels ok. He denies exertional chest pain, palpitations, light-headedness, edema. He is under a great deal of family stress. BP is elevated today. Feels it will \"take a while to get going\" when trying to push mow the lawn. He is not exercising as much . Past Medical History:   has a past medical history of Arthritis, CAD (coronary artery disease), History of blood transfusion, Hyperlipidemia, Hypertension, MARIE (nonalcoholic steatohepatitis), Osteoarthritis, Spinal stenosis, Thrombocytopenia ([address] Type 2 diabetes mellitus without complication ([address] Type II or unspecified type diabetes mellitus without mention of complication, not stated as uncontrolled, and Wears glasses. Surgical History:   has a past surgical history that includes cyst removal (Right); Colonoscopy (12/04/2018); Upper gastrointestinal endoscopy (12/04/2018); Upper gastrointestinal endoscopy (N/A, 12/4/2018); Colonoscopy (N/A, 12/4/2018); Coronary angioplasty with stent (12/04/2018); Upper gastrointestinal endoscopy (N/A, 3/6/2019); Upper gastrointestinal endoscopy (N/A, 4/3/2019); Upper gastrointestinal endoscopy (N/A, 6/5/2019); Inguinal hernia repair (Left, 12/11/15); Hand surgery (Right, 2013); ventral hernia repair (N/A, 11/18/2019); Upper gastrointestinal endoscopy (N/A, 12/18/2019); back surgery (11/25/2016); back surgery (2020);  Upper gastrointestinal endoscopy (N/A, 9/2/2020); and Upper gastrointestinal endoscopy (N/A, 3/3/2021). Social History:   reports that he has never smoked. He has never used smokeless tobacco. He reports previous alcohol use. He reports that he does not use drugs. Family History:  family history includes Diabetes in his brother and father; Heart Disease in his father; Kidney Disease in his mother; No Known Problems in his brother. Home Medications:  Prior to Admission medications    Medication Sig Start Date End Date Taking?  Authorizing Provider   potassium chloride (KLOR-CON M) 20 MEQ extended release tablet Take 20 mEq by mouth daily 11/13/20  Yes Historical Provider, MD   lansoprazole (PREVACID) 30 MG delayed release capsule Take 30 mg by mouth daily   Yes Historical Provider, MD   methocarbamol (ROBAXIN) 750 MG tablet Take 750 mg by mouth every 8 hours as needed 4/1/19  Yes Historical Provider, MD   aspirin 81 MG tablet Take 1 tablet by mouth daily 4/22/19  Yes Pasha Naranjo MD   atorvastatin (LIPITOR) 40 MG tablet Take 1 tablet by mouth nightly 1/30/19  Yes PATRICK Morrissey - ANAYA   glipiZIDE (GLUCOTROL) 10 MG tablet Take 10 mg by mouth 2 times daily (before meals)   Yes Historical Provider, MD   metFORMIN (GLUCOPHAGE) 1000 MG tablet Take 1 tablet by mouth 2 times daily (with meals) 12/7/18  Yes Collin Sheehan MD   nadolol (CORGARD) 80 MG tablet Take 1 tablet by mouth daily  Patient taking differently: Take 40 mg by mouth daily 40 mg daily 12/6/18  Yes Collin Sheehan MD   ferrous sulfate 325 (65 Fe) MG tablet Take 325 mg by mouth 2 times daily    Yes Historical Provider, MD   losartan-hydrochlorothiazide (HYZAAR) 100-25 MG per tablet Take 1 tablet by mouth daily  Patient taking differently: Take 1 tablet by mouth daily inafternoon 9/6/17  Yes Herlinda Bhandari MD   Multiple Vitamins-Minerals (MULTIVITAMIN PO) Take 1 tablet by mouth daily   Yes Historical Provider, MD   CINNAMON PO Take 1,000 mg by mouth 2 times daily Yes Historical Provider, MD   glucose blood VI test strips (FREESTYLE LITE) strip Test blood sugar once daily. 10/6/14  Yes Antoine Buck MD        Allergies:  Patient has no known allergies. Review of Systems:   · Constitutional: there has been no unanticipated weight loss. There's been no change in energy level, sleep pattern, or activity level. · Eyes: No visual changes or diplopia. No scleral icterus. · ENT: No Headaches, hearing loss or vertigo. No mouth sores or sore throat. · Cardiovascular: Reviewed in HPI  · Respiratory: No cough or wheezing, no sputum production. No hematemesis. · Gastrointestinal: No abdominal pain, appetite loss, blood in stools. No change in bowel or bladder habits. · Genitourinary: No dysuria, trouble voiding, or hematuria. · Musculoskeletal:  No gait disturbance, weakness or joint complaints. · Integumentary: No rash or pruritis. · Neurological: No headache, diplopia, change in muscle strength, numbness or tingling. No change in gait, balance, coordination, mood, affect, memory, mentation, behavior. · Psychiatric: No anxiety, no depression. · Endocrine: No malaise, fatigue or temperature intolerance. No excessive thirst, fluid intake, or urination. No tremor. · Hematologic/Lymphatic: No abnormal bruising or bleeding, blood clots or swollen lymph nodes. · Allergic/Immunologic: No nasal congestion or hives.     Physical Examination:    Vitals:    06/04/21 0913   BP: (!) 146/74   Pulse: 66   SpO2: 100%        Wt Readings from Last 1 Encounters:   06/04/21 248 lb 12.8 oz (112.9 kg)       Constitutional and General Appearance: NAD  Skin:good turgor,intact without lesions  HEENT: EOMI ,normal  Neck:no JVD    Respiratory:  · Normal excursion and expansion without use of accessory muscles  · Resp Auscultation: Normal breath sounds without dullness  Cardiovascular:  · The apical impulses not displaced  · Heart tones are crisp and normal  · Cervical veins are not engorged  · The carotid upstroke is normal in amplitude and contour without delay or bruit  · Peripheral pulses are symmetrical and full  · There is no clubbing, cyanosis of the extremities. · No edema  · Femoral Arteries: 2+ and equal  · Pedal Pulses: 2+ and equal   Abdomen:  · No masses or tenderness  · Liver/Spleen: No Abnormalities Noted  Neurological/Psychiatric:  · Alert and oriented in all spheres  · Moves all extremities well  · Exhibits normal gait balance and coordination  · No abnormalities of mood, affect, memory, mentation, or behavior are noted    Nuclear myoview 11/30/18   Summary    There is a moderate sized fixed inferior defect suggestive of scar.    There is mild inferior hypokinesis with EF=50%.    High risk treadmill with Guillermo treadmill score of -15.        Recommendation    Pt sent to ER for admission.       ECG Findings    Abnormal ECG portion of stress test with 2 mm ST    horizontal depression with stress consistent with    ischemia.       Abd u/s 12/4/2018  Aorta is poorly visualized with normal caliber proximally and in the    midportion. [facility] 12/5/18   Patient with markedly abnormal stress with inferior ischemia. Cath done with 75% prox RCA,85% distal RCA. Now post GI w/up with varices identified. GI service cleared him for dual antiplatelet therapy ,now post transfusion     PCI with VOLODYMYR x 2 in prox and distal RCA. He will need dual antiplatelet therapy, prefer for 6 months but at least 1 month    Stress echo 11/14/2019  -Normal stress echocardiogram study.   -Target heart rate not achieved. -Normal left ventricle size, wall thickness, and systolic function with an estimated ejection fraction of 55%. There is concentric left ventricular hypertrophy.   -Mild mitral regurgitation.   -Trivial aortic regurgitation.   -Mild tricuspid regurgitation with PASP of 30 mmHg. Assessment:    1.  Coronary artery disease involving native coronary artery of native heart without angina pectoris  Under a great deal of stress- plan for maurilio myoview   hx NSTEMI: metoprolol changed to nadolol (cirrhosis & varices)  Not on Plavix due to his bleeding issues, but tolerates baby aspirin. s/p PTCA prox & distal RCA Dec 2018  Stress echo 11/14/2019> Normal, EF 55%    2. Essential hypertension   Stable  Blood pressure (!) 146/74, pulse 66, height 6' 2\" (1.88 m), weight 248 lb 12.8 oz (112.9 kg), SpO2 100 %. 3. Mixed hyperlipidemia    Well controlled on Lipitor 40mg  11/14/20> , , HDL 33, LDL 62      Plan:    Melvi Elizabeth has a stable cardiac status. Cardiac test and lab results personally reviewed by me during this office visit and discussed. 1. Lexiscan myoview   2. No med changes  3. Return for follow up in 6 months or sooner based on testing     I appreciate the opportunity of cooperating in the care of this individual.    Timothy Monaco. Joaquin Santos M.D., [facility]    Patient's problem list, medications, allergies, past medical, surgical, social and family histories were reviewed and updated as appropriate. Scribe's attestation: This note was scribed in the presence of Dr. Joaquin Santos MD, by Rolando Mcneal RN. The scribe's documentation has been prepared under my direction and personally reviewed by me in its entirety. I confirm that the note above accurately reflects all work, treatment, procedures, and medical decision making performed by me.

## 2021-06-18 ENCOUNTER — HOSPITAL ENCOUNTER (OUTPATIENT)
Dept: NON INVASIVE DIAGNOSTICS | Age: 71
Discharge: HOME | End: 2021-06-18
Payer: MEDICARE

## 2021-06-18 DIAGNOSIS — I25.10 CORONARY ARTERY DISEASE INVOLVING NATIVE CORONARY ARTERY OF NATIVE HEART WITHOUT ANGINA PECTORIS: ICD-10-CM

## 2021-06-18 DIAGNOSIS — I10 ESSENTIAL HYPERTENSION: ICD-10-CM

## 2021-06-18 DIAGNOSIS — E78.49 OTHER HYPERLIPIDEMIA: ICD-10-CM

## 2021-06-18 LAB
LV EF: 66 %
LVEF MODALITY: NORMAL

## 2021-06-18 PROCEDURE — 93017 CV STRESS TEST TRACING ONLY: CPT | Performed by: INTERNAL MEDICINE

## 2021-06-18 PROCEDURE — 78452 HT MUSCLE IMAGE SPECT MULT: CPT | Performed by: INTERNAL MEDICINE

## 2021-06-18 PROCEDURE — 3430000000 HC RX DIAGNOSTIC RADIOPHARMACEUTICAL: Performed by: INTERNAL MEDICINE

## 2021-06-18 PROCEDURE — 6360000002 HC RX W HCPCS: Performed by: INTERNAL MEDICINE

## 2021-06-18 PROCEDURE — A9502 TC99M TETROFOSMIN: HCPCS | Performed by: INTERNAL MEDICINE

## 2021-06-18 RX ADMIN — TETROFOSMIN 10 MILLICURIE: 1.38 INJECTION, POWDER, LYOPHILIZED, FOR SOLUTION INTRAVENOUS at 07:56

## 2021-06-18 RX ADMIN — REGADENOSON 0.4 MG: 0.08 INJECTION, SOLUTION INTRAVENOUS at 09:10

## 2021-06-18 RX ADMIN — TETROFOSMIN 10 MILLICURIE: 1.38 INJECTION, POWDER, LYOPHILIZED, FOR SOLUTION INTRAVENOUS at 09:19

## 2021-06-18 NOTE — PROGRESS NOTES
Instructed on Lexiscan Stress Test Procedure including possible side effects/ adverse reactions. Patient verbalizes  understanding and denies having any questions . See [address] [place] [address] Cardiology

## 2021-08-31 ENCOUNTER — ANESTHESIA EVENT (OUTPATIENT)
Dept: ENDOSCOPY | Age: 71
End: 2021-08-31
Payer: MEDICARE

## 2021-08-31 NOTE — PROGRESS NOTES
Pt was requested to have Rapid Covid test to be done prior to surgery/procedure. Understands that surgery/procedure maybe subjected to cancel if not completed prior to DOS and to bring copy of rapid testing in DOS. If positive, pt must contact surgeon immediately or with any other questions. C-Difficile admission screening and protocol:     * Admitted with diarrhea? n     *Prior history of C-Diff. In last 3 months?n     *Antibiotic use in the past 6-8 weeks?n     *Prior hospitalization or nursing home in the last monthn? [facility]. .call before you fall  [address] [facility] time_6______        Surgery time____________    Take the following medications with a sip of water:    Do not eat or drink anything after 12:00 midnight prior to your surgery. This includes water chewing gum, mints and ice chips. You may brush your teeth and gargle the morning of your surgery, but do not swallow the water     Please see your family doctor/pediatrician for a history and physical and/or concerning medications. Bring any test results/reports from your physicians office. If you are under the care of a heart doctor or specialist doctor, please be aware that you may be asked to them for clearance    You may be asked to stop blood thinners such as Coumadin, Plavix, Fragmin, Lovenox, etc., or any anti-inflammatories such as:  Aspirin, Ibuprofen, Advil, Naproxen prior to your surgery. We also ask that you stop any OTC medications such as fish oil, vitamin E, glucosamine, garlic, Multivitamins, COQ 10, etc.    We ask that you do not smoke 24 hours prior to surgery  We ask that you do not  drink any alcoholic beverages 24 hours prior to surgery     You must make arrangements for a responsible adult to take you home after your surgery. For your safety you will not be allowed to leave alone or drive yourself home. Your surgery will be cancelled if you do not have a ride home. Also for your safety, it is strongly suggested that someone stay with you the first 24 hours after your surgery. A parent or legal guardian must accompany a child scheduled for surgery and plan to stay at the hospital until the child is discharged. Please do not bring other children with you. For your comfort, please wear simple loose fitting clothing to the hospital.  Please do not bring valuables. Do not wear any make-up or nail polish on your fingers or toes      For your safety, please do not wear any jewelry or body piercing's on the day of surgery. All jewelry must be removed. If you have dentures, they will be removed before going to operating room. For your convenience, we will provide you with a container. If you wear contact lenses or glasses, they will be removed, please bring a case for them. If you have a living will and a durable power of  for healthcare, please bring in a copy. As part of our patient safety program to minimize surgical site infections, we ask you to do the following:    · Please notify your surgeon if you develop any illness between         now and the  day of your surgery. · This includes a cough, cold, fever, sore throat, nausea,         or vomiting, and diarrhea, etc.  ·  Please notify your surgeon if you experience dizziness, shortness         of breath or blurred vision between now and the time of your surgery. Do not shave your operative site 96 hours prior to surgery. For face and neck surgery, men may use an electric razor 48 hours   prior to surgery. You may shower the night before surgery or the morning of   your surgery with an antibacterial soap.     You will need to bring a photo ID and insurance card    [facility] has an onsite pharmacy, would you like to utilize our pharmacy     If you will be staying overnight and use a C-pap machine, please bring   your C-pap to hospital     Our goal is to provide you with excellent care, therefore, visitors will be limited to two(2) in the room at a time so that we may focus on providing this care for you. Please contact pre-admission testing if you have any further questions. [facility] phone number:  [phone] PAT fax number:  [phone]  Please note these are generalized instructions for all surgical cases, you may be provided with more specific instructions according to your surgery. Preoperative Screening for Elective Surgery/Invasive Procedures While COVID-19 present in the community     Have you tested positive or have been told to self-isolate for COVID-19 like symptoms within the past 28 days? n   Do you currently have any of the following symptoms? n  o Fever >100.0 F or 99.9 F in immunocompromised patients?n  o New onset cough, shortness of breath or difficulty breathing?n  o New onset sore throat, myalgia (muscle aches and pains), headache, loss of taste/smell or diarrhea?n   Have you had a potential exposure to COVID-19 within the past 14 days by:  o Close contact with a confirmed case?n  o Close contact with a healthcare worker,  or essential infrastructure worker (grocery store, [employer], gas station, public utilities or transportation)? y  o Do you reside in a congregate setting such as; skilled nursing facility, adult home, correctional facility, homeless shelter or other institutional setting?n  o Have you had recent travel to a known COVID-19 hotspot?n    Indicate if the patient has a positive screen by answering yes to one or more of the above questions. Patients who test positive or screen positive prior to surgery or on the day of surgery should be evaluated in conjunction with the surgeon/proceduralist/anesthesiologist to determine the urgency of the procedure.

## 2021-09-01 ENCOUNTER — ANESTHESIA (OUTPATIENT)
Dept: ENDOSCOPY | Age: 71
End: 2021-09-01
Payer: MEDICARE

## 2021-09-01 ENCOUNTER — HOSPITAL ENCOUNTER (OUTPATIENT)
Age: 71
Setting detail: OUTPATIENT SURGERY
Discharge: HOME | End: 2021-09-01
Attending: INTERNAL MEDICINE | Admitting: INTERNAL MEDICINE
Payer: MEDICARE

## 2021-09-01 VITALS
HEART RATE: 67 BPM | HEIGHT: 74 IN | OXYGEN SATURATION: 100 % | BODY MASS INDEX: 32.03 KG/M2 | RESPIRATION RATE: 16 BRPM | WEIGHT: 249.56 LBS | DIASTOLIC BLOOD PRESSURE: 80 MMHG | SYSTOLIC BLOOD PRESSURE: 156 MMHG | TEMPERATURE: 97.2 F

## 2021-09-01 VITALS
SYSTOLIC BLOOD PRESSURE: 159 MMHG | OXYGEN SATURATION: 98 % | RESPIRATION RATE: 22 BRPM | DIASTOLIC BLOOD PRESSURE: 72 MMHG

## 2021-09-01 LAB
GLUCOSE BLD-MCNC: 178 MG/DL (ref 70–99)
PERFORMED ON: ABNORMAL

## 2021-09-01 PROCEDURE — 7100000001 HC PACU RECOVERY - ADDTL 15 MIN: Performed by: INTERNAL MEDICINE

## 2021-09-01 PROCEDURE — 2709999900 HC NON-CHARGEABLE SUPPLY: Performed by: INTERNAL MEDICINE

## 2021-09-01 PROCEDURE — 2580000003 HC RX 258: Performed by: ANESTHESIOLOGY

## 2021-09-01 PROCEDURE — 7100000000 HC PACU RECOVERY - FIRST 15 MIN: Performed by: INTERNAL MEDICINE

## 2021-09-01 PROCEDURE — 3700000000 HC ANESTHESIA ATTENDED CARE: Performed by: INTERNAL MEDICINE

## 2021-09-01 PROCEDURE — 2500000003 HC RX 250 WO HCPCS: Performed by: NURSE ANESTHETIST, CERTIFIED REGISTERED

## 2021-09-01 PROCEDURE — 7100000011 HC PHASE II RECOVERY - ADDTL 15 MIN: Performed by: INTERNAL MEDICINE

## 2021-09-01 PROCEDURE — 3609017100 HC EGD: Performed by: INTERNAL MEDICINE

## 2021-09-01 PROCEDURE — 7100000010 HC PHASE II RECOVERY - FIRST 15 MIN: Performed by: INTERNAL MEDICINE

## 2021-09-01 PROCEDURE — 6360000002 HC RX W HCPCS: Performed by: NURSE ANESTHETIST, CERTIFIED REGISTERED

## 2021-09-01 PROCEDURE — 2580000003 HC RX 258: Performed by: NURSE ANESTHETIST, CERTIFIED REGISTERED

## 2021-09-01 RX ORDER — PROPOFOL 10 MG/ML
INJECTION, EMULSION INTRAVENOUS PRN
Status: DISCONTINUED | OUTPATIENT
Start: 2021-09-01 | End: 2021-09-01 | Stop reason: SDUPTHER

## 2021-09-01 RX ORDER — SODIUM CHLORIDE 9 MG/ML
INJECTION, SOLUTION INTRAVENOUS CONTINUOUS
Status: DISCONTINUED | OUTPATIENT
Start: 2021-09-01 | End: 2021-09-01 | Stop reason: HOSPADM

## 2021-09-01 RX ORDER — SODIUM CHLORIDE 0.9 % (FLUSH) 0.9 %
10 SYRINGE (ML) INJECTION EVERY 12 HOURS SCHEDULED
Status: DISCONTINUED | OUTPATIENT
Start: 2021-09-01 | End: 2021-09-01 | Stop reason: HOSPADM

## 2021-09-01 RX ORDER — SODIUM CHLORIDE 0.9 % (FLUSH) 0.9 %
10 SYRINGE (ML) INJECTION PRN
Status: DISCONTINUED | OUTPATIENT
Start: 2021-09-01 | End: 2021-09-01 | Stop reason: HOSPADM

## 2021-09-01 RX ORDER — LIDOCAINE HYDROCHLORIDE 20 MG/ML
INJECTION, SOLUTION EPIDURAL; INFILTRATION; INTRACAUDAL; PERINEURAL PRN
Status: DISCONTINUED | OUTPATIENT
Start: 2021-09-01 | End: 2021-09-01 | Stop reason: SDUPTHER

## 2021-09-01 RX ORDER — SODIUM CHLORIDE 9 MG/ML
25 INJECTION, SOLUTION INTRAVENOUS PRN
Status: DISCONTINUED | OUTPATIENT
Start: 2021-09-01 | End: 2021-09-01 | Stop reason: HOSPADM

## 2021-09-01 RX ORDER — SODIUM CHLORIDE 9 MG/ML
INJECTION, SOLUTION INTRAVENOUS CONTINUOUS PRN
Status: DISCONTINUED | OUTPATIENT
Start: 2021-09-01 | End: 2021-09-01 | Stop reason: SDUPTHER

## 2021-09-01 RX ORDER — ONDANSETRON 2 MG/ML
4 INJECTION INTRAMUSCULAR; INTRAVENOUS
Status: DISCONTINUED | OUTPATIENT
Start: 2021-09-01 | End: 2021-09-01 | Stop reason: HOSPADM

## 2021-09-01 RX ORDER — GLYCOPYRROLATE 0.2 MG/ML
INJECTION INTRAMUSCULAR; INTRAVENOUS PRN
Status: DISCONTINUED | OUTPATIENT
Start: 2021-09-01 | End: 2021-09-01 | Stop reason: SDUPTHER

## 2021-09-01 RX ADMIN — SODIUM CHLORIDE: 9 INJECTION, SOLUTION INTRAVENOUS at 07:19

## 2021-09-01 RX ADMIN — PROPOFOL 100 MG: 10 INJECTION, EMULSION INTRAVENOUS at 08:35

## 2021-09-01 RX ADMIN — PROPOFOL 100 MG: 10 INJECTION, EMULSION INTRAVENOUS at 08:31

## 2021-09-01 RX ADMIN — LIDOCAINE HYDROCHLORIDE 60 MG: 20 INJECTION, SOLUTION EPIDURAL; INFILTRATION; INTRACAUDAL; PERINEURAL at 08:31

## 2021-09-01 RX ADMIN — PROPOFOL 50 MG: 10 INJECTION, EMULSION INTRAVENOUS at 08:33

## 2021-09-01 RX ADMIN — GLYCOPYRROLATE 0.2 MG: 0.2 INJECTION, SOLUTION INTRAMUSCULAR; INTRAVENOUS at 08:31

## 2021-09-01 RX ADMIN — SODIUM CHLORIDE: 9 INJECTION, SOLUTION INTRAVENOUS at 08:28

## 2021-09-01 ASSESSMENT — PULMONARY FUNCTION TESTS
PIF_VALUE: 0
PIF_VALUE: 1
PIF_VALUE: 0

## 2021-09-01 ASSESSMENT — LIFESTYLE VARIABLES: SMOKING_STATUS: 0

## 2021-09-01 ASSESSMENT — PAIN SCALES - GENERAL
PAINLEVEL_OUTOF10: 0

## 2021-09-01 ASSESSMENT — PAIN - FUNCTIONAL ASSESSMENT: PAIN_FUNCTIONAL_ASSESSMENT: 0-10

## 2021-09-01 NOTE — BRIEF OP NOTE
Brief Postoperative Note    Teodora Batista  YOB: 1950  [id number]    Pre-operative Diagnosis: Cirrhosis; variceal screening    Post-operative Diagnosis: Same    Procedure: EGD    Anesthesia: MAC    Surgeons/Assistants: Liana Carcamo MD    Estimated Blood Loss: None    Complications: None    Specimens: Was Not Obtained    Findings: See dictated report    Electronically signed by Liana Carcamo MD on 9/1/2021 at 8:38 AM

## 2021-09-01 NOTE — H&P
[facility]   Pre-operative History and Physical    Patient: Jere Barnes  : 1950  Acct#:         HISTORY OF PRESENT ILLNESS:    The patient is a 66 y.o. male  who presents with cirrhosis; variceal screening  Past Medical History:        Diagnosis Date    Arthritis     CAD (coronary artery disease)     History of blood transfusion     Hyperlipidemia     Hypertension     MARIE (nonalcoholic steatohepatitis)     Osteoarthritis     Spinal stenosis     Thrombocytopenia (HCC)     Type 2 diabetes mellitus without complication ([address]     Type II or unspecified type diabetes mellitus without mention of complication, not stated as uncontrolled     type 2    Wears glasses      Past Surgical History:        Procedure Laterality Date    BACK SURGERY  2016    L3, L4,L5 remove spurs    BACK SURGERY      L2, L3 released pressure from nerve    COLONOSCOPY  2018    wiht polypectomy x3    COLONOSCOPY N/A 2018    COLONOSCOPY POLYPECTOMY SNARE/COLD BIOPSY performed by Isi Melvin MD at [facility]  12/04/2018    x2 stents-70% and 85% blocked    CYST REMOVAL Right     on arm    HAND SURGERY Right     dupuytren syndrome    INGUINAL HERNIA REPAIR Left 12/11/15    laparoscopic    UPPER GASTROINTESTINAL ENDOSCOPY  2018    with biopsy    UPPER GASTROINTESTINAL ENDOSCOPY N/A 2018    EGD BIOPSY performed by Isi Melvin MD at [address] [place] [address] N/A 3/6/2019    EGD BAND LIGATION performed by Joyce Herrera MD at [address] [facility] N/A 4/3/2019    ESOPHAGOGASTRODUODENOSCOPY WITH BANDING performed by Joyce Herrera MD at [address] [facility] N/A 2019    ESOPHAGOGASTRODUODENOSCOPY performed by Joyce Herrera MD at [address] [facility] 2019    EGD ESOPHAGOGASTRODUODENOSCOPY performed by Yvonne Joseph MD at [address] [facility] N/A 9/2/2020    EGD BIOPSY performed by Yvonne Joseph MD at [address] [facility] N/A 3/3/2021    ESOPHAGOGASTRODUODENOSCOPY performed by Yvonne Joseph MD at [address] [facility] N/A 11/18/2019    OPEN REPAIR OF INCARCERATED VENTRAL HERNIA WITH MESH performed by Maxine Metcalf MD at [facility]     Medications prior to admission:   Prior to Admission medications    Medication Sig Start Date End Date Taking?  Authorizing Provider   potassium chloride (KLOR-CON M) 20 MEQ extended release tablet Take 20 mEq by mouth daily 11/13/20  Yes Historical Provider, MD   lansoprazole (PREVACID) 30 MG delayed release capsule Take 30 mg by mouth daily   Yes Historical Provider, MD   aspirin 81 MG tablet Take 1 tablet by mouth daily 4/22/19  Yes Perez Swartz MD   atorvastatin (LIPITOR) 40 MG tablet Take 1 tablet by mouth nightly 1/30/19  Yes PATRICK Back - CNP   glipiZIDE (GLUCOTROL) 10 MG tablet Take 10 mg by mouth 2 times daily (before meals)   Yes Historical Provider, MD   metFORMIN (GLUCOPHAGE) 1000 MG tablet Take 1 tablet by mouth 2 times daily (with meals) 12/7/18  Yes Caleb Kennedy MD   nadolol (CORGARD) 80 MG tablet Take 1 tablet by mouth daily  Patient taking differently: Take 40 mg by mouth daily 40 mg daily 12/6/18  Yes Caleb Kennedy MD   ferrous sulfate 325 (65 Fe) MG tablet Take 325 mg by mouth 2 times daily    Yes Historical Provider, MD   losartan-hydrochlorothiazide (HYZAAR) 100-25 MG per tablet Take 1 tablet by mouth daily  Patient taking differently: Take 1 tablet by mouth daily inafternoon 9/6/17  Yes Sonia Block MD   Multiple Vitamins-Minerals (MULTIVITAMIN PO) Take 1 tablet by mouth daily   Yes Historical Provider, MD   CINNAMON PO Take 1,000 mg by mouth 2 times daily   Yes Historical Provider, MD methocarbamol (ROBAXIN) 750 MG tablet Take 750 mg by mouth every 8 hours as needed 4/1/19   Historical Provider, MD   glucose blood VI test strips (FREESTYLE LITE) strip Test blood sugar once daily. 10/6/14   Mary Osuna MD     Allergies:    Patient has no known allergies. Social History:   Social History     Socioeconomic History    Marital status:      Spouse name: Angi Crouch Number of children: Not on file    Years of education: 12    Highest education level: Not on file   Occupational History    Not on file   Tobacco Use    Smoking status: Never Smoker    Smokeless tobacco: Never Used   Vaping Use    Vaping Use: Never used   Substance and Sexual Activity    Alcohol use: Not Currently     Alcohol/week: 0.0 standard drinks     Comment: quit drinking 1990's    Drug use: Never    Sexual activity: Yes     Partners: Female   Other Topics Concern    Not on file   Social History Narrative    Not on file     Social Determinants of Health     Financial Resource Strain:     Difficulty of Paying Living Expenses:    Food Insecurity:     Worried About Running Out of Food in the Last Year:     [address] in the Last Year:    Transportation Needs:     Lack of Transportation (Medical):      Lack of Transportation (Non-Medical):    Physical Activity:     Days of Exercise per Week:     Minutes of Exercise per Session:    Stress:     Feeling of Stress :    Social Connections:     Frequency of Communication with Friends and Family:     Frequency of Social Gatherings with Friends and Family:     Attends Baptism Services:     Active Member of Clubs or Organizations:     Attends Club or Organization Meetings:     Marital Status:    Intimate Partner Violence:     Fear of Current or Ex-Partner:     Emotionally Abused:     Physically Abused:     Sexually Abused:            Family History:   Family History   Problem Relation Age of Onset    Kidney Disease Mother     Heart Disease Father  Diabetes Father     No Known Problems Brother     Diabetes Brother          PHYSICAL EXAM:      /60   Pulse 69   Temp 98.1 °F (36.7 °C) (Temporal)   Resp 16   Ht 6' 2\" (1.88 m)   Wt 249 lb 9 oz (113.2 kg)   SpO2 99%   BMI 32.04 kg/m²  I        Heart: Normal    Lungs: Normal    Abdomen: Normal      ASA Grade: ASA 3 - Patient with moderate systemic disease with functional limitations    II (soft palate, uvula, fauces visible)  ASSESSMENT AND PLAN:    1. Patient is a 66 y.o. male here for EGD  2. Procedure options, risks and benefits reviewed with patient who expresses understanding.

## 2021-09-01 NOTE — OP NOTE
Operative Note      Patient: Boyd Eldridge  YOB: 1950  MRN: [id number]    Date of Procedure: 9/1/2021    Pre-Op Diagnosis: CIRRHOSIS, NON-ALCOHOLIC, ESOPHAGEAL VARICES    Post-Op Diagnosis: Same       Procedure(s):  ESOPHAGOGASTRODUODENOSCOPY WITH POSSIBLE BANDING    Surgeon(s):  Kasi Herrera MD    Assistant:   * No surgical staff found *    Anesthesia: Monitor Anesthesia Care    Estimated Blood Loss (mL): None    Complications: None    Specimens:   * No specimens in log *    Implants:  * No implants in log *      Drains: * No LDAs found *    Findings: See dictated report    Detailed Description of Procedure:   See dictated report    Electronically signed by Kasi Herrera MD on 9/1/2021 at 8:39 AM

## 2021-09-01 NOTE — PROCEDURES
[address]                                 PROCEDURE NOTE    PATIENT NAME: Cory Prakash                   :        1950  MED REC NO:   [id number]                          ROOM:  ACCOUNT NO:   [de-identified]                           ADMIT DATE: 2021  PROVIDER:     Mauricio Hummel MD    EGD    DATE OF PROCEDURE:  2021    REFERRING PROVIDER:  Sonya Rockwell MD    PATIENT HISTORY:  A 58-year-old male, outpatient. INSTRUMENT USED:  Olympus GIF-Q180. ANESTHESIA:  The patient was premedicated with Diprivan intravenously as  administered by the anesthesiology service. INDICATIONS:  The patient has cirrhosis on the basis of nonalcoholic  steatohepatitis. He is undergoing a routine EGD for variceal screening. PROCEDURE:  The endoscope was inserted into the esophagus without  difficulty. The Z-line was located at 42 cm. There were very small  distal variceal channels that did not require banding. There were some  slightly more prominent varices in the mid esophagus. In the stomach,  there were no gastric varices. There were changes of early portal  hypertensive gastropathy. Also noted again was antral nodularity, which  had been biopsied previously. The duodenal bulb and descending duodenum  were normal.    ESTIMATED BLOOD LOSS:  None. IMPRESSION:  1. Small distal esophageal varices that did not require banding. 2.  Somewhat more prominent mid esophageal varices. 3.  No gastric varices. 4.  Early portal hypertensive gastropathy. 5.  Persistent gastric antral nodularity. PLAN:  I will recommend a followup EGD in six months. KEITH Almeida MD    D: 2021 8:53:58       T: 2021 8:57:56     [id number]  Job#: [id number]     Doc#: [id number]    CC:  Mauricio Hummel MD

## 2021-09-01 NOTE — ANESTHESIA PRE PROCEDURE
nerve    COLONOSCOPY  12/04/2018    wiht polypectomy x3    COLONOSCOPY N/A 12/4/2018    COLONOSCOPY POLYPECTOMY SNARE/COLD BIOPSY performed by Lady Alec MD at [facility]  12/04/2018    x2 stents-70% and 85% blocked    CYST REMOVAL Right     on arm    HAND SURGERY Right 2013    dupuytren syndrome    INGUINAL HERNIA REPAIR Left 12/11/15    laparoscopic    UPPER GASTROINTESTINAL ENDOSCOPY  12/04/2018    with biopsy    UPPER GASTROINTESTINAL ENDOSCOPY N/A 12/4/2018    EGD BIOPSY performed by Lady Alec MD at [place] [address] N/A 3/6/2019    EGD BAND LIGATION performed by Duc Noriega MD at [address] [facility] N/A 4/3/2019    ESOPHAGOGASTRODUODENOSCOPY WITH BANDING performed by Duc Noriega MD at [address] [facility] 6/5/2019    ESOPHAGOGASTRODUODENOSCOPY performed by Duc Noriega MD at [address] [facility] 12/18/2019    EGD ESOPHAGOGASTRODUODENOSCOPY performed by Duc Noriega MD at [address] [facility] N/A 9/2/2020    EGD BIOPSY performed by Duc Noriega MD at [address] [facility] N/A 3/3/2021    ESOPHAGOGASTRODUODENOSCOPY performed by Duc Noriega MD at [address] [place] [address] N/A 11/18/2019    OPEN REPAIR OF INCARCERATED VENTRAL HERNIA WITH MESH performed by Alison Antonio MD at [address] [place] [address] History:    Social History     Tobacco Use    Smoking status: Never Smoker    Smokeless tobacco: Never Used   Substance Use Topics    Alcohol use: Not Currently     Alcohol/week: 0.0 standard drinks     Comment: quit drinking 1990's                                Counseling given: Not Answered      Vital Signs (Current):   Vitals:    08/31/21 0905 09/01/21 0712   BP:  132/60   Pulse:  69   Resp: 16   Temp:  98.1 °F (36.7 °C)   TempSrc:  Temporal   SpO2:  99%   Weight: 248 lb (112.5 kg) 249 lb 9 oz (113.2 kg)   Height: 6' 2\" (1.88 m) 6' 2\" (1.88 m)                                              BP Readings from Last 3 Encounters:   09/01/21 132/60   06/04/21 (!) 146/74   03/03/21 (!) 171/78       NPO Status: Time of last liquid consumption: 2200                         Time of last solid consumption: 2100                        Date of last liquid consumption: 08/31/21                        Date of last solid food consumption: 08/31/21    BMI:   Wt Readings from Last 3 Encounters:   09/01/21 249 lb 9 oz (113.2 kg)   06/04/21 248 lb 12.8 oz (112.9 kg)   03/03/21 245 lb 4.2 oz (111.3 kg)     Body mass index is 32.04 kg/m². CBC:   Lab Results   Component Value Date    WBC 3.9 11/14/2020    RBC 4.10 11/18/2019    RBC 4.03 11/23/2016    HGB 12.7 11/14/2020    HCT 36.3 11/14/2020    MCV 88.9 11/14/2020    RDW 14.5 11/14/2020    PLT 72 11/14/2020       CMP:   Lab Results   Component Value Date     11/14/2020    K 4.2 11/14/2020    K 3.8 12/06/2018    CL 99 11/14/2020    CO2 26 11/14/2020    BUN 14 11/14/2020    CREATININE 1.00 11/14/2020    GFRAA 89 11/14/2020    GFRAA >60 03/08/2013    AGRATIO 1.4 10/26/2019    LABGLOM 73 11/14/2020    GLUCOSE 130 11/14/2020    PROT 6.8 10/26/2019    PROT 7.5 03/08/2013    CALCIUM 9.6 11/14/2020    BILITOT 1.7 10/26/2019    ALKPHOS 86 10/26/2019    AST 33 11/14/2020    ALT 33 11/14/2020       POC Tests:   Recent Labs     09/01/21  0723   POCGLU 178*       Coags:   Lab Results   Component Value Date    PROTIME 13.0 11/30/2018    INR 1.14 11/30/2018       HCG (If Applicable): No results found for: PREGTESTUR, PREGSERUM, HCG, HCGQUANT     ABGs:   Lab Results   Component Value Date    PHART 7.219 12/11/2015    PO2ART 414 12/11/2015    OLM5LHN 68 12/11/2015    CRS4JSX 27.8 12/11/2015    BEART 0 12/11/2015    S1XOKSNX 100 12/11/2015        Type & Screen (If Applicable):   No results found for: [facility]    Anesthesia Evaluation  Patient summary reviewed no history of anesthetic complications:   Airway: Mallampati: II  TM distance: >3 FB   Neck ROM: full  Mouth opening: > = 3 FB Dental:      Comment: No loose teeth    Pulmonary:Negative Pulmonary ROS breath sounds clear to auscultation      (-) COPD, asthma, sleep apnea and not a current smoker          Patient did not smoke on day of surgery. Cardiovascular:  Exercise tolerance: poor (<4 METS),   (+) hypertension: moderate, past MI: > 6 months, CAD: obstructive, CABG/stent (2 stents): no interval change, hyperlipidemia    (-) pacemaker and dysrhythmias      Rhythm: regular  Rate: normal           Beta Blocker:  Dose within 24 Hrs      ROS comment: Stress (6/18/2021):   Stress Interpretation. Appropriate hemodynamic response to lexiscan with no significant ST changes. Neuro/Psych:      (-) seizures, TIA and CVA           GI/Hepatic/Renal:   (+) GERD: well controlled, liver disease: portal hypertension, esophageal varices,      (-) no renal diseaseMorbid obesity: BMI: 32.       Endo/Other:    (+) DiabetesType II DM, well controlled, , : arthritis:., no malignancy/cancer. (-) hypothyroidism, hyperthyroidism, no malignancy/cancer               Abdominal:         (-) obese       Vascular: negative vascular ROS. Other Findings:             Anesthesia Plan      MAC     ASA 3       Induction: intravenous. Anesthetic plan and risks discussed with patient. Use of blood products discussed with patient whom consented to blood products. Plan discussed with CRNA. This pre-anesthesia assessment may be used as a history and physical.    DOS STAFF ADDENDUM:    Pt seen and examined, chart reviewed (including anesthesia, drug and allergy history). No interval changes to history and physical examination. Anesthetic plan, risks, benefits, alternatives, and personnel involved discussed with patient. Patient verbalized an understanding and agrees to proceed.       Maria G Brunson MD  September 1, 2021  7:55 AM

## 2021-09-01 NOTE — ANESTHESIA POSTPROCEDURE EVALUATION
Department of Anesthesiology  Postprocedure Note    Patient: Glenna Londono  MRN: [id number]  YOB: 1950  Date of evaluation: 9/1/2021  Time:  8:56 AM     Procedure Summary     Date: 09/01/21 Room / Location: [address] [place] [address]    Anesthesia Start: 2605 Anesthesia Stop: 0846    Procedure: ESOPHAGOGASTRODUODENOSCOPY WITH POSSIBLE BANDING (N/A ) Diagnosis:       Cirrhosis, non-alcoholic ([facility] [address]      (CIRRHOSIS, NON-ALCOHOLIC, ESOPHAGEAL VARICES)    Surgeons: Dottie Cohen MD Responsible Provider: Morena Coates MD    Anesthesia Type: MAC ASA Status: 3          Anesthesia Type: MAC    Queenie Phase I: Queenie Score: 8    Queenie Phase II:      Last vitals: Reviewed and per EMR flowsheets. Anesthesia Post Evaluation    Patient location during evaluation: PACU  Patient participation: complete - patient participated  Level of consciousness: awake and alert  Airway patency: patent  Nausea & Vomiting: no nausea and no vomiting  Complications: no  Cardiovascular status: hemodynamically stable and blood pressure returned to baseline  Respiratory status: spontaneous ventilation, nonlabored ventilation and room air  Hydration status: stable  Comments: Mr. Coreas Lone comfortably conversing with staff following procedure. He denies any significant pain or nausea. Appropriate for discharge to home.

## 2021-11-04 ENCOUNTER — OFFICE VISIT (OUTPATIENT)
Dept: SURGERY | Age: 71
End: 2021-11-04
Payer: MEDICARE

## 2021-11-04 VITALS — BODY MASS INDEX: 31.84 KG/M2 | DIASTOLIC BLOOD PRESSURE: 61 MMHG | WEIGHT: 248 LBS | SYSTOLIC BLOOD PRESSURE: 125 MMHG

## 2021-11-04 DIAGNOSIS — R10.9 RIGHT LATERAL ABDOMINAL PAIN: Primary | ICD-10-CM

## 2021-11-04 PROCEDURE — 3017F COLORECTAL CA SCREEN DOC REV: CPT | Performed by: SURGERY

## 2021-11-04 PROCEDURE — 1036F TOBACCO NON-USER: CPT | Performed by: SURGERY

## 2021-11-04 PROCEDURE — 4040F PNEUMOC VAC/ADMIN/RCVD: CPT | Performed by: SURGERY

## 2021-11-04 PROCEDURE — G8484 FLU IMMUNIZE NO ADMIN: HCPCS | Performed by: SURGERY

## 2021-11-04 PROCEDURE — 1123F ACP DISCUSS/DSCN MKR DOCD: CPT | Performed by: SURGERY

## 2021-11-04 PROCEDURE — G8427 DOCREV CUR MEDS BY ELIG CLIN: HCPCS | Performed by: SURGERY

## 2021-11-04 PROCEDURE — 99213 OFFICE O/P EST LOW 20 MIN: CPT | Performed by: SURGERY

## 2021-11-04 PROCEDURE — G8417 CALC BMI ABV UP PARAM F/U: HCPCS | Performed by: SURGERY

## 2021-11-04 ASSESSMENT — ENCOUNTER SYMPTOMS
ALLERGIC/IMMUNOLOGIC NEGATIVE: 1
EYES NEGATIVE: 1
ABDOMINAL PAIN: 1
RESPIRATORY NEGATIVE: 1

## 2021-11-04 NOTE — PROGRESS NOTES
Aarti Mckenzie MD at [address] [place] N/A 4/3/2019    ESOPHAGOGASTRODUODENOSCOPY WITH BANDING performed by Aarti Mckenzie MD at [address] [place] N/A 6/5/2019    ESOPHAGOGASTRODUODENOSCOPY performed by Aarti Mckenzie MD at [address] [place] 12/18/2019    EGD ESOPHAGOGASTRODUODENOSCOPY performed [facility]art[facility] Mckenzie MD [facility] [address] [facility] N/A 9/2/2020    EGD BIOPSY performed by Aarti Mckenzie MD at [address] [place] 3/3/2021    ESOPHAGOGASTRODUODENOSCOPY performed by Aarti Mckenzie MD at [address] [place] N/A 9/1/2021    ESOPHAGOGASTRODUODENOSCOPY performed by Aarti Mckenzie MD at [address] [facility] N/A 11/18/2019    OPEN REPAIR OF INCARCERATED VENTRAL HERNIA WITH MESH performed by Ramu Alexis MD at [facility]       Current Medications:   No current facility-administered medications for this visit. Prior to Admission medications    Medication Sig Start Date End Date Taking?  Authorizing Provider   potassium chloride (KLOR-CON M) 20 MEQ extended release tablet Take 20 mEq by mouth daily 11/13/20  Yes Historical Provider, MD   lansoprazole (PREVACID) 30 MG delayed release capsule Take 30 mg by mouth daily   Yes Historical Provider, MD   aspirin 81 MG tablet Take 1 tablet by mouth daily 4/22/19  Yes Marybeth Ortiz MD   atorvastatin (LIPITOR) 40 MG tablet Take 1 tablet by mouth nightly 1/30/19  Yes PATRICK Garcia - CNP   glipiZIDE (GLUCOTROL) 10 MG tablet Take 10 mg by mouth 2 times daily (before meals)   Yes Historical Provider, MD   metFORMIN (GLUCOPHAGE) 1000 MG tablet Take 1 tablet by mouth 2 times daily (with meals) 12/7/18  Yes Nick Mohan MD   nadolol (CORGARD) 80 MG tablet Take 1 tablet by mouth daily  Patient taking differently: Take 40 mg by mouth daily 40 mg daily 12/6/18  Yes Clemencia Gilford, MD   ferrous sulfate 325 (65 Fe) MG tablet Take 325 mg by mouth 2 times daily    Yes Historical Provider, MD   losartan-hydrochlorothiazide (HYZAAR) 100-25 MG per tablet Take 1 tablet by mouth daily  Patient taking differently: Take 1 tablet by mouth daily inafternoon 9/6/17  Yes Butch Cohen MD   Multiple Vitamins-Minerals (MULTIVITAMIN PO) Take 1 tablet by mouth daily   Yes Historical Provider, MD   CINNAMON PO Take 1,000 mg by mouth 2 times daily   Yes Historical Provider, MD   glucose blood VI test strips (FREESTYLE LITE) strip Test blood sugar once daily. 10/6/14  Yes Butch Cohen MD   methocarbamol (ROBAXIN) 750 MG tablet Take 750 mg by mouth every 8 hours as needed 4/1/19   Historical Provider, MD        Allergies:  Patient has no known allergies. Social History:    reports that he has never smoked. He has never used smokeless tobacco. He reports previous alcohol use. He reports that he does not use drugs. Family History:        Problem Relation Age of Onset    Kidney Disease Mother     Heart Disease Father     Diabetes Father     No Known Problems Brother     Diabetes Brother        REVIEW OF SYSTEMS:  Review of Systems   Constitutional: Negative. HENT: Negative. Eyes: Negative. Respiratory: Negative. Cardiovascular: Negative. Gastrointestinal: Positive for abdominal pain. Endocrine: Negative. Genitourinary: Negative. Musculoskeletal: Negative. Skin: Negative. Allergic/Immunologic: Negative. Neurological: Negative. Hematological: Negative. Psychiatric/Behavioral: Negative.         PHYSICAL EXAM:  VITALS:  Wt 248 lb (112.5 kg)   BMI 31.84 kg/m²   CONSTITUTIONAL:  alert, no apparent distress and normal weight  EYES:  sclera clear  ENT:  normocepalic, without obvious abnormality  NECK:  supple, symmetrical, trachea midline  LUNGS:  clear to auscultation  CARDIOVASCULAR:  regular rate and rhythm  ABDOMEN:  scars noted are healed, normal bowel sounds, soft, non-distended, tenderness noted mildly with pressure and small mass palpable in the right mid abdomen, voluntary guarding absent, no hepatosplenomegally and hernia possible - uncertain  MUSCULOSKELETAL:  0+ edema lower extremities  NEUROLOGIC:  Mental Status Exam:  Level of Alertness:   awake  Orientation:   person, place, time  SKIN:  no bruising or bleeding, normal skin color, texture, turgor and no redness, warmth, or swelling    DATA:      Radiology Review:   None    IMPRESSION/RECOMMENDATIONS:    Right mid abd swelling and tenderness, possible recent injury due to lifting and strain, possible recurrent hernia     Will check a CT scan to eval and plan treatment, BMP as well    The plan has been reviewed in detail today. Risks and benefits have been reviewed, and all questions answered.      25 minutes spent    Epifanio Porras MD

## 2021-11-04 NOTE — LETTER
[address] [address]  Phone: [phone]  Fax: [phone]    Adela Grossman MD    November 4, 2021     [address] [place] [address]    Patient: Wilton Whalen   MR Number: [id number]   YOB: 1950   Date of Visit: 11/4/2021       Dear Benny Johnson: Thank you for referring Geraldine Narvaez to me for evaluation/treatment. Below are the relevant portions of my assessment and plan of care. If you have questions, please do not hesitate to call me. I look forward to following Giovanni Raya along with you.     Sincerely,      Adela Grossman MD

## 2021-12-01 ENCOUNTER — HOSPITAL ENCOUNTER (OUTPATIENT)
Dept: CT IMAGING | Age: 71
Discharge: HOME | End: 2021-12-01
Payer: MEDICARE

## 2021-12-01 DIAGNOSIS — R10.9 RIGHT LATERAL ABDOMINAL PAIN: ICD-10-CM

## 2021-12-01 PROCEDURE — 6360000004 HC RX CONTRAST MEDICATION: Performed by: SURGERY

## 2021-12-01 PROCEDURE — 74177 CT ABD & PELVIS W/CONTRAST: CPT

## 2021-12-01 RX ADMIN — IOHEXOL 50 ML: 240 INJECTION, SOLUTION INTRATHECAL; INTRAVASCULAR; INTRAVENOUS; ORAL at 07:13

## 2021-12-01 RX ADMIN — IOPAMIDOL 75 ML: 755 INJECTION, SOLUTION INTRAVENOUS at 07:13

## 2021-12-01 NOTE — RESULT ENCOUNTER NOTE
CT reviewed, please call pt. Has a new hernia present in the abdomen to the right of the umbilical area. Likely area of symptoms / pain. Would go ahead and set up for outpt laparoscopic repair.   Anita Johnston

## 2021-12-02 ENCOUNTER — TELEPHONE (OUTPATIENT)
Dept: SURGERY | Age: 71
End: 2021-12-02

## 2021-12-03 DIAGNOSIS — Z01.818 PRE-OP TESTING: Primary | ICD-10-CM

## 2021-12-06 DIAGNOSIS — Z01.818 PRE-OP TESTING: ICD-10-CM

## 2021-12-06 RX ORDER — LANOLIN ALCOHOL/MO/W.PET/CERES
1000 CREAM (GRAM) TOPICAL DAILY
COMMUNITY

## 2021-12-06 NOTE — PROGRESS NOTES
Name_______________________________________Printed:____________________  Date and time of surgery__12/10/21____1200__________________Arrival Time:__1030___MAIN___________   1. The instructions given regarding when and if a patient needs to stop oral intake prior to surgery varies. Follow the specific instructions you were given                  _X__Nothing to eat or to drink after Midnight the night before.                   ____Carbo loading or ERAS instructions will be given to select patients-if you have been given those instructions -please do the following                           The evening before your surgery after dinner before midnight drink 40 ounces of gatorade. If you are diabetic use sugar free. The morning of surgery drink 40 ounces of water. This needs to be finished 3 hours prior to your surgery start time. 2. Take the following pills with a small sip of water on the morning of surgery____Nadolol_______________________________________________                  Do not take blood pressure medications ending in pril or sartan the gautam prior to surgery or the morning of surgery_   3. Aspirin, Ibuprofen, Advil, Naproxen, Vitamin E and other Anti-inflammatory products and supplements should be stopped for 5 -7days before surgery or as directed by your physician. 4. Check with your Doctor regarding stopping Plavix, Coumadin,Eliquis, Lovenox,Effient,Pradaxa,Xarelto, Fragmin or other blood thinners and follow their instructions. 5. Do not smoke, and do not drink any alcoholic beverages 24 hours prior to surgery. This includes NA Beer. Refrain from the usage of any recreational drugs. 6. You may brush your teeth and gargle the morning of surgery. DO NOT SWALLOW WATER   7. You MUST make arrangements for a responsible adult to stay on site while you are here and take you home after your surgery. You will not be allowed to leave alone or drive yourself home.   It is strongly suggested someone stay with you the first 24 hrs. Your surgery will be cancelled if you do not have a ride home. 8. A parent/legal guardian must accompany a child scheduled for surgery and plan to stay at the hospital until the child is discharged. Please do not bring other children with you. 9. Please wear simple, loose fitting clothing to the hospital.  Cedric Spicer not bring valuables (money, credit cards, checkbooks, etc.) Do not wear any makeup (including no eye makeup) or nail polish on your fingers or toes. 10. DO NOT wear any jewelry or piercings on day of surgery. All body piercing jewelry must be removed. 11. If you have ___dentures, they will be removed before going to the OR; we will provide you a container. If you wear ___contact lenses or _X__glasses, they will be removed; please bring a case for them. 12. Please see your family doctor/pediatrician for a history & physical and/or concerning medications. Bring any test results/reports from your physician's office. PCP__________________Phone___________H&P Appt. Date________             13 If you  have a Living Will and Durable Power of  for Healthcare, please bring in a copy. 15. Notify your Surgeon if you develop any illness between now and surgery  time, cough, cold, fever, sore throat, nausea, vomiting, etc.  Please notify your surgeon if you experience dizziness, shortness of breath or blurred vision between now & the time of your surgery             15. DO NOT shave your operative site 96 hours prior to surgery. For face & neck surgery, men may use an electric razor 48 hours prior to surgery. 16. Shower the night before or morning of surgery using an antibacterial soap or as you have been instructed. 17. To provide excellent care visitors will be limited to one in the room at any given time. 18.  Please bring picture ID and insurance card.              19.  Visit our web site for additional communication. If the ride is leaving the hospital grounds please make sure they are back in time for pickup. Have the patient inform the staff on arrival what their rides plans are while the patient is in the facility. At the MAIN there is one visitor allowed. Please note that the visitor policy is subject to change. All above information reviewed with patient in person or by phone. Patient verbalizes understanding. All questions and concerns addressed.                                                                                                  Patient/Rep__Patient__________________                                                                                                                                    PRE OP INSTRUCTIONS

## 2021-12-07 LAB — SARS-COV-2, PCR: NOT DETECTED

## 2021-12-10 ENCOUNTER — ANESTHESIA EVENT (OUTPATIENT)
Dept: OPERATING ROOM | Age: 71
End: 2021-12-10
Payer: MEDICARE

## 2021-12-10 ENCOUNTER — HOSPITAL ENCOUNTER (OUTPATIENT)
Age: 71
Setting detail: OUTPATIENT SURGERY
Discharge: HOME | End: 2021-12-10
Attending: SURGERY | Admitting: SURGERY
Payer: MEDICARE

## 2021-12-10 ENCOUNTER — ANESTHESIA (OUTPATIENT)
Dept: OPERATING ROOM | Age: 71
End: 2021-12-10
Payer: MEDICARE

## 2021-12-10 VITALS
SYSTOLIC BLOOD PRESSURE: 136 MMHG | TEMPERATURE: 97 F | BODY MASS INDEX: 31.44 KG/M2 | RESPIRATION RATE: 18 BRPM | WEIGHT: 245 LBS | HEIGHT: 74 IN | DIASTOLIC BLOOD PRESSURE: 53 MMHG | OXYGEN SATURATION: 99 % | HEART RATE: 75 BPM

## 2021-12-10 VITALS
RESPIRATION RATE: 3 BRPM | TEMPERATURE: 96.6 F | DIASTOLIC BLOOD PRESSURE: 75 MMHG | SYSTOLIC BLOOD PRESSURE: 155 MMHG | OXYGEN SATURATION: 100 %

## 2021-12-10 DIAGNOSIS — K43.0 RECURRENT INCISIONAL HERNIA WITH INCARCERATION: Primary | ICD-10-CM

## 2021-12-10 LAB
ANION GAP SERPL CALCULATED.3IONS-SCNC: 11 MMOL/L (ref 3–16)
BUN BLDV-MCNC: 13 MG/DL (ref 7–20)
CALCIUM SERPL-MCNC: 9.7 MG/DL (ref 8.3–10.6)
CHLORIDE BLD-SCNC: 96 MMOL/L (ref 99–110)
CO2: 26 MMOL/L (ref 21–32)
CREAT SERPL-MCNC: 1.1 MG/DL (ref 0.8–1.3)
GFR AFRICAN AMERICAN: >60
GFR NON-AFRICAN AMERICAN: >60
GLUCOSE BLD-MCNC: 149 MG/DL (ref 70–99)
GLUCOSE BLD-MCNC: 153 MG/DL (ref 70–99)
GLUCOSE BLD-MCNC: 157 MG/DL (ref 70–99)
HCT VFR BLD CALC: 39.6 % (ref 40.5–52.5)
HEMOGLOBIN: 13.6 G/DL (ref 13.5–17.5)
MCH RBC QN AUTO: 31.1 PG (ref 26–34)
MCHC RBC AUTO-ENTMCNC: 34.2 G/DL (ref 31–36)
MCV RBC AUTO: 90.8 FL (ref 80–100)
PDW BLD-RTO: 14.6 % (ref 12.4–15.4)
PERFORMED ON: ABNORMAL
PERFORMED ON: ABNORMAL
PLATELET # BLD: 84 K/UL (ref 135–450)
PLATELET SLIDE REVIEW: ABNORMAL
PMV BLD AUTO: 8.6 FL (ref 5–10.5)
POTASSIUM SERPL-SCNC: 3.9 MMOL/L (ref 3.5–5.1)
RBC # BLD: 4.36 M/UL (ref 4.2–5.9)
SLIDE REVIEW: ABNORMAL
SODIUM BLD-SCNC: 133 MMOL/L (ref 136–145)
WBC # BLD: 5.3 K/UL (ref 4–11)

## 2021-12-10 PROCEDURE — 3600000014 HC SURGERY LEVEL 4 ADDTL 15MIN: Performed by: SURGERY

## 2021-12-10 PROCEDURE — C1781 MESH (IMPLANTABLE): HCPCS | Performed by: SURGERY

## 2021-12-10 PROCEDURE — 2500000003 HC RX 250 WO HCPCS: Performed by: NURSE ANESTHETIST, CERTIFIED REGISTERED

## 2021-12-10 PROCEDURE — 36415 COLL VENOUS BLD VENIPUNCTURE: CPT

## 2021-12-10 PROCEDURE — 3700000001 HC ADD 15 MINUTES (ANESTHESIA): Performed by: SURGERY

## 2021-12-10 PROCEDURE — 6360000002 HC RX W HCPCS: Performed by: NURSE ANESTHETIST, CERTIFIED REGISTERED

## 2021-12-10 PROCEDURE — 2580000003 HC RX 258: Performed by: ANESTHESIOLOGY

## 2021-12-10 PROCEDURE — 7100000001 HC PACU RECOVERY - ADDTL 15 MIN: Performed by: SURGERY

## 2021-12-10 PROCEDURE — 85027 COMPLETE CBC AUTOMATED: CPT

## 2021-12-10 PROCEDURE — 3600000004 HC SURGERY LEVEL 4 BASE: Performed by: SURGERY

## 2021-12-10 PROCEDURE — 2500000003 HC RX 250 WO HCPCS: Performed by: SURGERY

## 2021-12-10 PROCEDURE — 80048 BASIC METABOLIC PNL TOTAL CA: CPT

## 2021-12-10 PROCEDURE — 6360000002 HC RX W HCPCS: Performed by: SURGERY

## 2021-12-10 PROCEDURE — 2709999900 HC NON-CHARGEABLE SUPPLY: Performed by: SURGERY

## 2021-12-10 PROCEDURE — 2580000003 HC RX 258: Performed by: SURGERY

## 2021-12-10 PROCEDURE — 6370000000 HC RX 637 (ALT 250 FOR IP): Performed by: SURGERY

## 2021-12-10 PROCEDURE — 3700000000 HC ANESTHESIA ATTENDED CARE: Performed by: SURGERY

## 2021-12-10 PROCEDURE — 7100000000 HC PACU RECOVERY - FIRST 15 MIN: Performed by: SURGERY

## 2021-12-10 PROCEDURE — 7100000010 HC PHASE II RECOVERY - FIRST 15 MIN: Performed by: SURGERY

## 2021-12-10 PROCEDURE — 2720000010 HC SURG SUPPLY STERILE: Performed by: SURGERY

## 2021-12-10 PROCEDURE — 49657 PR LAP, RECURRENT INCISIONAL HERNIA REPAIR,INCARCERATED: CPT | Performed by: SURGERY

## 2021-12-10 PROCEDURE — 7100000011 HC PHASE II RECOVERY - ADDTL 15 MIN: Performed by: SURGERY

## 2021-12-10 DEVICE — MESH SURG DIA6IN UNCOATED M WT MFIL PROPYLENE CIR HYDRGEL: Type: IMPLANTABLE DEVICE | Site: ABDOMEN | Status: FUNCTIONAL

## 2021-12-10 RX ORDER — PROPOFOL 10 MG/ML
INJECTION, EMULSION INTRAVENOUS PRN
Status: DISCONTINUED | OUTPATIENT
Start: 2021-12-10 | End: 2021-12-10 | Stop reason: SDUPTHER

## 2021-12-10 RX ORDER — HYDROCODONE BITARTRATE AND ACETAMINOPHEN 5; 325 MG/1; MG/1
1 TABLET ORAL ONCE
Status: COMPLETED | OUTPATIENT
Start: 2021-12-10 | End: 2021-12-10

## 2021-12-10 RX ORDER — ROCURONIUM BROMIDE 10 MG/ML
INJECTION, SOLUTION INTRAVENOUS PRN
Status: DISCONTINUED | OUTPATIENT
Start: 2021-12-10 | End: 2021-12-10 | Stop reason: SDUPTHER

## 2021-12-10 RX ORDER — PHENYLEPHRINE HCL IN 0.9% NACL 1 MG/10 ML
SYRINGE (ML) INTRAVENOUS PRN
Status: DISCONTINUED | OUTPATIENT
Start: 2021-12-10 | End: 2021-12-10 | Stop reason: SDUPTHER

## 2021-12-10 RX ORDER — GLYCOPYRROLATE 1 MG/5 ML
SYRINGE (ML) INTRAVENOUS PRN
Status: DISCONTINUED | OUTPATIENT
Start: 2021-12-10 | End: 2021-12-10 | Stop reason: SDUPTHER

## 2021-12-10 RX ORDER — SODIUM CHLORIDE 9 MG/ML
25 INJECTION, SOLUTION INTRAVENOUS PRN
Status: DISCONTINUED | OUTPATIENT
Start: 2021-12-10 | End: 2021-12-10 | Stop reason: HOSPADM

## 2021-12-10 RX ORDER — MAGNESIUM HYDROXIDE 1200 MG/15ML
LIQUID ORAL CONTINUOUS PRN
Status: COMPLETED | OUTPATIENT
Start: 2021-12-10 | End: 2021-12-10

## 2021-12-10 RX ORDER — MEPERIDINE HYDROCHLORIDE 25 MG/ML
12.5 INJECTION INTRAMUSCULAR; INTRAVENOUS; SUBCUTANEOUS EVERY 5 MIN PRN
Status: DISCONTINUED | OUTPATIENT
Start: 2021-12-10 | End: 2021-12-10 | Stop reason: HOSPADM

## 2021-12-10 RX ORDER — DEXAMETHASONE SODIUM PHOSPHATE 4 MG/ML
INJECTION, SOLUTION INTRA-ARTICULAR; INTRALESIONAL; INTRAMUSCULAR; INTRAVENOUS; SOFT TISSUE PRN
Status: DISCONTINUED | OUTPATIENT
Start: 2021-12-10 | End: 2021-12-10 | Stop reason: SDUPTHER

## 2021-12-10 RX ORDER — FENTANYL CITRATE 50 UG/ML
INJECTION, SOLUTION INTRAMUSCULAR; INTRAVENOUS PRN
Status: DISCONTINUED | OUTPATIENT
Start: 2021-12-10 | End: 2021-12-10 | Stop reason: SDUPTHER

## 2021-12-10 RX ORDER — LIDOCAINE HYDROCHLORIDE 20 MG/ML
INJECTION, SOLUTION EPIDURAL; INFILTRATION; INTRACAUDAL; PERINEURAL PRN
Status: DISCONTINUED | OUTPATIENT
Start: 2021-12-10 | End: 2021-12-10 | Stop reason: SDUPTHER

## 2021-12-10 RX ORDER — SODIUM CHLORIDE 0.9 % (FLUSH) 0.9 %
10 SYRINGE (ML) INJECTION PRN
Status: DISCONTINUED | OUTPATIENT
Start: 2021-12-10 | End: 2021-12-10 | Stop reason: HOSPADM

## 2021-12-10 RX ORDER — SODIUM CHLORIDE, SODIUM LACTATE, POTASSIUM CHLORIDE, CALCIUM CHLORIDE 600; 310; 30; 20 MG/100ML; MG/100ML; MG/100ML; MG/100ML
INJECTION, SOLUTION INTRAVENOUS CONTINUOUS
Status: DISCONTINUED | OUTPATIENT
Start: 2021-12-10 | End: 2021-12-10 | Stop reason: HOSPADM

## 2021-12-10 RX ORDER — OXYCODONE HYDROCHLORIDE AND ACETAMINOPHEN 5; 325 MG/1; MG/1
1 TABLET ORAL
Status: DISCONTINUED | OUTPATIENT
Start: 2021-12-10 | End: 2021-12-10 | Stop reason: HOSPADM

## 2021-12-10 RX ORDER — LIDOCAINE HYDROCHLORIDE 10 MG/ML
1 INJECTION, SOLUTION EPIDURAL; INFILTRATION; INTRACAUDAL; PERINEURAL
Status: DISCONTINUED | OUTPATIENT
Start: 2021-12-10 | End: 2021-12-10 | Stop reason: HOSPADM

## 2021-12-10 RX ORDER — SUCCINYLCHOLINE/SOD CL,ISO/PF 200MG/10ML
SYRINGE (ML) INTRAVENOUS PRN
Status: DISCONTINUED | OUTPATIENT
Start: 2021-12-10 | End: 2021-12-10 | Stop reason: SDUPTHER

## 2021-12-10 RX ORDER — ONDANSETRON 2 MG/ML
INJECTION INTRAMUSCULAR; INTRAVENOUS PRN
Status: DISCONTINUED | OUTPATIENT
Start: 2021-12-10 | End: 2021-12-10 | Stop reason: SDUPTHER

## 2021-12-10 RX ORDER — HYDRALAZINE HYDROCHLORIDE 20 MG/ML
5 INJECTION INTRAMUSCULAR; INTRAVENOUS EVERY 10 MIN PRN
Status: DISCONTINUED | OUTPATIENT
Start: 2021-12-10 | End: 2021-12-10 | Stop reason: HOSPADM

## 2021-12-10 RX ORDER — DOCUSATE SODIUM 100 MG/1
100 CAPSULE, LIQUID FILLED ORAL 2 TIMES DAILY
Qty: 30 CAPSULE | Refills: 0 | Status: SHIPPED | OUTPATIENT
Start: 2021-12-10 | End: 2021-12-17

## 2021-12-10 RX ORDER — ONDANSETRON 2 MG/ML
4 INJECTION INTRAMUSCULAR; INTRAVENOUS
Status: DISCONTINUED | OUTPATIENT
Start: 2021-12-10 | End: 2021-12-10 | Stop reason: HOSPADM

## 2021-12-10 RX ORDER — SODIUM CHLORIDE 0.9 % (FLUSH) 0.9 %
10 SYRINGE (ML) INJECTION EVERY 12 HOURS SCHEDULED
Status: DISCONTINUED | OUTPATIENT
Start: 2021-12-10 | End: 2021-12-10 | Stop reason: HOSPADM

## 2021-12-10 RX ORDER — HYDROCODONE BITARTRATE AND ACETAMINOPHEN 5; 325 MG/1; MG/1
1 TABLET ORAL EVERY 4 HOURS PRN
Qty: 25 TABLET | Refills: 0 | Status: SHIPPED | OUTPATIENT
Start: 2021-12-10 | End: 2021-12-17

## 2021-12-10 RX ORDER — BUPIVACAINE HYDROCHLORIDE 5 MG/ML
INJECTION, SOLUTION EPIDURAL; INTRACAUDAL
Status: COMPLETED | OUTPATIENT
Start: 2021-12-10 | End: 2021-12-10

## 2021-12-10 RX ORDER — HYDROMORPHONE HCL 110MG/55ML
0.5 PATIENT CONTROLLED ANALGESIA SYRINGE INTRAVENOUS EVERY 5 MIN PRN
Status: DISCONTINUED | OUTPATIENT
Start: 2021-12-10 | End: 2021-12-10 | Stop reason: HOSPADM

## 2021-12-10 RX ORDER — LABETALOL HYDROCHLORIDE 5 MG/ML
5 INJECTION, SOLUTION INTRAVENOUS EVERY 10 MIN PRN
Status: DISCONTINUED | OUTPATIENT
Start: 2021-12-10 | End: 2021-12-10 | Stop reason: HOSPADM

## 2021-12-10 RX ADMIN — DEXAMETHASONE SODIUM PHOSPHATE 4 MG: 4 INJECTION, SOLUTION INTRAMUSCULAR; INTRAVENOUS at 12:28

## 2021-12-10 RX ADMIN — FENTANYL CITRATE 50 MCG: 50 INJECTION, SOLUTION INTRAMUSCULAR; INTRAVENOUS at 12:22

## 2021-12-10 RX ADMIN — ROCURONIUM BROMIDE 10 MG: 10 INJECTION, SOLUTION INTRAVENOUS at 12:12

## 2021-12-10 RX ADMIN — FENTANYL CITRATE 50 MCG: 50 INJECTION, SOLUTION INTRAMUSCULAR; INTRAVENOUS at 11:46

## 2021-12-10 RX ADMIN — Medication 100 MCG: at 11:58

## 2021-12-10 RX ADMIN — SODIUM CHLORIDE: 9 INJECTION, SOLUTION INTRAVENOUS at 11:41

## 2021-12-10 RX ADMIN — ROCURONIUM BROMIDE 30 MG: 10 INJECTION, SOLUTION INTRAVENOUS at 11:54

## 2021-12-10 RX ADMIN — CEFAZOLIN 2000 MG: 10 INJECTION, POWDER, FOR SOLUTION INTRAVENOUS at 11:38

## 2021-12-10 RX ADMIN — ONDANSETRON 4 MG: 2 INJECTION INTRAMUSCULAR; INTRAVENOUS at 12:49

## 2021-12-10 RX ADMIN — Medication 100 MCG: at 12:10

## 2021-12-10 RX ADMIN — HYDROCODONE BITARTRATE AND ACETAMINOPHEN 1 TABLET: 5; 325 TABLET ORAL at 14:13

## 2021-12-10 RX ADMIN — Medication 140 MG: at 11:47

## 2021-12-10 RX ADMIN — Medication 100 MCG: at 12:35

## 2021-12-10 RX ADMIN — PROPOFOL 150 MG: 10 INJECTION, EMULSION INTRAVENOUS at 11:46

## 2021-12-10 RX ADMIN — LIDOCAINE HYDROCHLORIDE 100 MG: 20 INJECTION, SOLUTION EPIDURAL; INFILTRATION; INTRACAUDAL; PERINEURAL at 11:46

## 2021-12-10 RX ADMIN — ROCURONIUM BROMIDE 10 MG: 10 INJECTION, SOLUTION INTRAVENOUS at 11:46

## 2021-12-10 RX ADMIN — Medication 0.2 MG: at 12:35

## 2021-12-10 RX ADMIN — SUGAMMADEX 200 MG: 100 INJECTION, SOLUTION INTRAVENOUS at 12:56

## 2021-12-10 ASSESSMENT — PULMONARY FUNCTION TESTS
PIF_VALUE: 0
PIF_VALUE: 17
PIF_VALUE: 28
PIF_VALUE: 27
PIF_VALUE: 28
PIF_VALUE: 20
PIF_VALUE: 30
PIF_VALUE: 0
PIF_VALUE: 3
PIF_VALUE: 25
PIF_VALUE: 16
PIF_VALUE: 3
PIF_VALUE: 20
PIF_VALUE: 3
PIF_VALUE: 17
PIF_VALUE: 16
PIF_VALUE: 16
PIF_VALUE: 28
PIF_VALUE: 16
PIF_VALUE: 20
PIF_VALUE: 26
PIF_VALUE: 20
PIF_VALUE: 0
PIF_VALUE: 29
PIF_VALUE: 16
PIF_VALUE: 16
PIF_VALUE: 27
PIF_VALUE: 17
PIF_VALUE: 30
PIF_VALUE: 18
PIF_VALUE: 1
PIF_VALUE: 29
PIF_VALUE: 28
PIF_VALUE: 16
PIF_VALUE: 24
PIF_VALUE: 15
PIF_VALUE: 16
PIF_VALUE: 4
PIF_VALUE: 0
PIF_VALUE: 16
PIF_VALUE: 29
PIF_VALUE: 20
PIF_VALUE: 28
PIF_VALUE: 28
PIF_VALUE: 16
PIF_VALUE: 29
PIF_VALUE: 36
PIF_VALUE: 29
PIF_VALUE: 16
PIF_VALUE: 28
PIF_VALUE: 29
PIF_VALUE: 30
PIF_VALUE: 0
PIF_VALUE: 29
PIF_VALUE: 29
PIF_VALUE: 0
PIF_VALUE: 16
PIF_VALUE: 2
PIF_VALUE: 16
PIF_VALUE: 25
PIF_VALUE: 20
PIF_VALUE: 29
PIF_VALUE: 5
PIF_VALUE: 1
PIF_VALUE: 16
PIF_VALUE: 30
PIF_VALUE: 3
PIF_VALUE: 0
PIF_VALUE: 16
PIF_VALUE: 23
PIF_VALUE: 25
PIF_VALUE: 20
PIF_VALUE: 27
PIF_VALUE: 26
PIF_VALUE: 24
PIF_VALUE: 28
PIF_VALUE: 29
PIF_VALUE: 16
PIF_VALUE: 24
PIF_VALUE: 16
PIF_VALUE: 26
PIF_VALUE: 3
PIF_VALUE: 15
PIF_VALUE: 28
PIF_VALUE: 16
PIF_VALUE: 29
PIF_VALUE: 3
PIF_VALUE: 28

## 2021-12-10 ASSESSMENT — LIFESTYLE VARIABLES: SMOKING_STATUS: 0

## 2021-12-10 ASSESSMENT — PAIN SCALES - GENERAL: PAINLEVEL_OUTOF10: 4

## 2021-12-10 NOTE — PROGRESS NOTES
Pt arrived to PACU from OR in stable condition and is alert. Pt can move extremities to command. Respirations are even on 6L O2 per SM. Skin warm, dry, and with appropriate for ethnicity color. Abd is soft. Pain is tolerable at this time.   3 laparoscopic left side abdomen surgical site(s) intact with dressing= Dermabond surgical glue, well approximated, open to air        S/P: laparoscopic recurrent incisional hernia repair with mesh with Dr. Tomasa Urbano at [address] [facility] BSN, RN, [facility]  PACU, Pre-op, SDS

## 2021-12-10 NOTE — PROGRESS NOTES
Pt meets d/c criteria for phase 1 in PACU and has been seen by anesthesia. Ok to transition to phase 2 care. Will alert anyone in waiting room for them and the nursing unit if applicable. Will continue to monitor for safety and comfort.     Francesco LLOYDN, RN, [facility]  Pre-Op/Recovery   Same Day Surgery

## 2021-12-10 NOTE — H&P
[facility]        PATIENT NAME: Reji Rebolledo     TODAY'S DATE: 12/10/2021    HISTORY OF PRESENT ILLNESS:              The patient is a 66 y.o. male who presents with an incisional hernia, recurrent issue for pt. The patient has had prior hernia surgery. He has had previous abdominal surgery.       Past Medical History:        Diagnosis Date    Arthritis     CAD (coronary artery disease)     History of blood transfusion 2018    Hyperlipidemia     Hypertension     MARIE (nonalcoholic steatohepatitis)     Osteoarthritis     Spinal stenosis     Thrombocytopenia (HCC)     Type 2 diabetes mellitus without complication ([address]     Type II or unspecified type diabetes mellitus without mention of complication, not stated as uncontrolled     type 2    Wears glasses        Past Surgical History:        Procedure Laterality Date    BACK SURGERY  11/25/2016    L3, L4,L5 remove spurs    BACK SURGERY  2020    L2, L3 released pressure from nerve    COLONOSCOPY  12/04/2018    wiht polypectomy x3    COLONOSCOPY N/A 12/4/2018    COLONOSCOPY POLYPECTOMY SNARE/COLD BIOPSY performed by [facility]hi [facility]vel MD at [facility]  12/04/2018    x2 stents-70% and 85% blocked    CYST REMOVAL Right     on arm    HAND SURGERY Right 2013    dupuytren syndrome    INGUINAL HERNIA REPAIR Left 12/11/15    laparoscopic    UPPER GASTROINTESTINAL ENDOSCOPY  12/04/2018    with biopsy    UPPER GASTROINTESTINAL ENDOSCOPY N/A 12/4/2018    EGD BIOPSY performed by Vidhi Jovel MD at [address] [place] [address] N/A 3/6/2019    EGD BAND LIGATION performed by Sushma Green MD at [facility] N/A 4/3/2019    ESOPHAGOGASTRODUODENOSCOPY WITH BANDING performed by Sushma Green MD at [facility] N/A 6/5/2019    ESOPHAGOGASTRODUODENOSCOPY performed by Rajinder Vallecillo MD at [place] [address] 12/18/2019    EGD ESOPHAGOGASTRODUODENOSCOPY performed by Rajinder Vallecillo MD at [place] [address] 9/2/2020    EGD BIOPSY performed by Rajinder Vallecillo MD at [place] [address] N/A 3/3/2021    ESOPHAGOGASTRODUODENOSCOPY performed by Rajinder Vallecillo MD at [place] [address] N/A 9/1/2021    ESOPHAGOGASTRODUODENOSCOPY performed by Rajinder Vallecillo MD at [address] [place] [address] N/A 11/18/2019    OPEN REPAIR OF INCARCERATED VENTRAL HERNIA WITH MESH performed by Aimee Miller MD at [facility]       Current Medications:   Current Facility-Administered Medications: meperidine (DEMEROL) injection 12.5 mg, 12.5 mg, IntraVENous, Q5 Min PRN  HYDROmorphone (DILAUDID) injection 0.5 mg, 0.5 mg, IntraVENous, Q5 Min PRN  oxyCODONE-acetaminophen (PERCOCET) 5-325 MG per tablet 1 tablet, 1 tablet, Oral, Once PRN  ondansetron (ZOFRAN) injection 4 mg, 4 mg, IntraVENous, Once PRN  labetalol (NORMODYNE;TRANDATE) injection 5 mg, 5 mg, IntraVENous, Q10 Min PRN  hydrALAZINE (APRESOLINE) injection 5 mg, 5 mg, IntraVENous, Q10 Min PRN  Prior to Admission medications    Medication Sig Start Date End Date Taking?  Authorizing Provider   vitamin B-12 (CYANOCOBALAMIN) 1000 MCG tablet Take 1,000 mcg by mouth daily   Yes Historical Provider, MD   potassium chloride (KLOR-CON M) 20 MEQ extended release tablet Take 20 mEq by mouth daily 11/13/20  Yes Historical Provider, MD   lansoprazole (PREVACID) 30 MG delayed release capsule Take 30 mg by mouth daily   Yes Historical Provider, MD   methocarbamol (ROBAXIN) 750 MG tablet Take 750 mg by mouth every 8 hours as needed 4/1/19  Yes Historical Provider, MD   aspirin 81 MG tablet Take 1 tablet by mouth daily 4/22/19  Yes Annalise Alonzo MD   atorvastatin (LIPITOR) 40 MG tablet Take 1 tablet by mouth nightly 1/30/19  Yes Soledad Tate, APRN - CNP   glipiZIDE (GLUCOTROL) 10 MG tablet Take 10 mg by mouth 2 times daily (before meals)   Yes Historical Provider, MD   metFORMIN (GLUCOPHAGE) 1000 MG tablet Take 1 tablet by mouth 2 times daily (with meals) 12/7/18  Yes Mayco Luis MD   nadolol (CORGARD) 80 MG tablet Take 1 tablet by mouth daily  Patient taking differently: Take 40 mg by mouth daily 40 mg daily 12/6/18  Yes Mayco Luis MD   ferrous sulfate 325 (65 Fe) MG tablet Take 325 mg by mouth 2 times daily    Yes Historical Provider, MD   losartan-hydrochlorothiazide (HYZAAR) 100-25 MG per tablet Take 1 tablet by mouth daily  Patient taking differently: Take 1 tablet by mouth daily inafternoon 9/6/17  Yes Maciel Mcneill MD   Multiple Vitamins-Minerals (MULTIVITAMIN PO) Take 1 tablet by mouth daily   Yes Historical Provider, MD   CINNAMON PO Take 1,000 mg by mouth 2 times daily   Yes Historical Provider, MD   glucose blood VI test strips (FREESTYLE LITE) strip Test blood sugar once daily. 10/6/14  Yes Maciel Mcneill MD        Allergies:  Patient has no known allergies. Social History:    reports that he has never smoked. He has never used smokeless tobacco. He reports previous alcohol use. He reports that he does not use drugs.     Family History:        Problem Relation Age of Onset    Kidney Disease Mother     Heart Disease Father     Diabetes Father     No Known Problems Brother     Diabetes Brother          PHYSICAL EXAM:  VITALS:  BP (!) 145/65   Pulse 66   Temp 98.1 °F (36.7 °C) (Temporal)   Resp 20   Ht 6' 2\" (1.88 m)   Wt 245 lb (111.1 kg)   SpO2 99%   BMI 31.46 kg/m²   CONSTITUTIONAL:  alert, no apparent distress and mildly obese  EYES:  sclera clear  ENT:  normocepalic, without obvious abnormality  NECK:  supple, symmetrical, trachea midline  LUNGS:  clear to auscultation  CARDIOVASCULAR:  regular rate and rhythm  ABDOMEN:  scars noted - upper abdomen, normal bowel sounds, soft, non-distended, non-tender, voluntary guarding absent, no masses palpated, no hepatosplenomegally and hernia present mid upper to right  MUSCULOSKELETAL:  0+ edema lower extremities  NEUROLOGIC:  Mental Status Exam:  Level of Alertness:   awake  Orientation:   person, place, time  SKIN:  no bruising or bleeding, normal skin color, texture, turgor and no redness, warmth, or swelling    DATA:    CBC:   Recent Labs     12/10/21  1110   WBC 5.3   HGB 13.6   HCT 39.6*     BMP:  No results for input(s): NA, K, CL, CO2, BUN, CREATININE, GLUCOSE in the last 72 hours. Hepatic: No results for input(s): AST, ALT, ALB, BILITOT, ALKPHOS in the last 72 hours. Mag:    No results for input(s): MG in the last 72 hours. Phos:   No results for input(s): PHOS in the last 72 hours. INR: No results for input(s): INR in the last 72 hours. Radiology Review:   CT scan    IMPRESSION/RECOMMENDATIONS:    Ventral Incisional hernia without incarceration. Laparoscopic ventral incisional hernia repair with mesh will be scheduled today. The plan for surgery has been reviewed in detail today. Risks and benefits have been reviewed, and all questions answered.        Dallas Singleton MD

## 2021-12-10 NOTE — BRIEF OP NOTE
Brief Postoperative Note      Patient: Courtney Berry  YOB: 1950  MRN: [id number]    Date of Procedure: 12/10/2021    Pre-Op Diagnosis: K43.2  RECURRENT INCISIONAL HERNIA    Post-Op Diagnosis: Same       Procedure(s):  LAPAROSCOPIC RECURRENT INCISIONAL HERNIA REPAIR    Surgeon(s):  Elia Collier MD    Assistant:  Surgical Assistant: Latanya Echols    Anesthesia: General    Estimated Blood Loss (mL): Minimal    Complications: None    Specimens:   * No specimens in log *    Implants:  Implant Name Type Inv. Item Serial No.  Lot No. LRB No. Used Action   MESH SURG DIA6IN UNCOATED M WT MFIL PROPYLENE CIR HYDRGEL  MESH SURG DIA6IN UNCOATED M WT MFIL PROPYLENE CIR HYDRGEL  [employer] [id number] N/A 1 Implanted         Drains:   Urethral Catheter Non-latex 16 fr (Active)       Findings: Incarcerated omentum reduced and repair completed with Ventralight 15 cm mesh.    RIH incidentially noted - repair at future date  Liver cirrhotic in appearance      Electronically signed by lEia Collier MD on 12/10/2021 at 12:55 PM

## 2021-12-10 NOTE — ANESTHESIA PRE PROCEDURE
diabetes mellitus without complication ([facility] [address]     Type II or unspecified type diabetes mellitus without mention of complication, not stated as uncontrolled     type 2    Wears glasses        Past Surgical History:        Procedure Laterality Date    BACK SURGERY  11/25/2016    L3, L4,L5 remove spurs    BACK SURGERY  2020    L2, L3 released pressure from nerve    COLONOSCOPY  12/04/2018    wiht polypectomy x3    COLONOSCOPY N/A 12/4/2018    COLONOSCOPY POLYPECTOMY SNARE/COLD BIOPSY performed by Gaston Robin MD at [facility]  12/04/2018    x2 stents-70% and 85% blocked    CYST REMOVAL Right     on arm    HAND SURGERY Right 2013    dupuytren syndrome    INGUINAL HERNIA REPAIR Left 12/11/15    laparoscopic    UPPER GASTROINTESTINAL ENDOSCOPY  12/04/2018    with biopsy    UPPER GASTROINTESTINAL ENDOSCOPY N/A 12/4/2018    EGD BIOPSY performed by Gaston Robin MD at [address] [place] [address] N/A 3/6/2019    EGD BAND LIGATION performed by Heather Hooker MD at [facility] N/A 4/3/2019    ESOPHAGOGASTRODUODENOSCOPY WITH BANDING performed by Heather Hooker MD at [place] [address] 6/5/2019    ESOPHAGOGASTRODUODENOSCOPY performed by Heather Hooker MD at [place] [address] 12/18/2019    EGD ESOPHAGOGASTRODUODENOSCOPY performed by Heather Hooker MD at [place] [address] 9/2/2020    EGD BIOPSY performed by Heather Hooker MD at [place] [address] 3/3/2021    ESOPHAGOGASTRODUODENOSCOPY performed by Heather Hooker MD at [facility] N/A 9/1/2021    ESOPHAGOGASTRODUODENOSCOPY performed by Heather Hooker MD at [address] [place] [address] N/A 11/18/2019    OPEN REPAIR OF INCARCERATED VENTRAL HERNIA WITH MESH performed by Arturo Johansen MD at [address] [place] [address] History:    Social History     Tobacco Use    Smoking status: Never Smoker    Smokeless tobacco: Never Used   Substance Use Topics    Alcohol use: Not Currently     Alcohol/week: 0.0 standard drinks     Comment: quit drinking 1990's                                Counseling given: Not Answered      Vital Signs (Current): There were no vitals filed for this visit.                                            BP Readings from Last 3 Encounters:   12/10/21 (!) 145/65   11/04/21 125/61   09/01/21 (!) 159/72       NPO Status:                                                                                 BMI:   Wt Readings from Last 3 Encounters:   12/06/21 245 lb (111.1 kg)   11/04/21 248 lb (112.5 kg)   09/01/21 249 lb 9 oz (113.2 kg)     There is no height or weight on file to calculate BMI.    CBC:   Lab Results   Component Value Date    WBC 3.9 11/14/2020    RBC 4.10 11/18/2019    RBC 4.03 11/23/2016    HGB 12.7 11/14/2020    HCT 36.3 11/14/2020    MCV 88.9 11/14/2020    RDW 14.5 11/14/2020    PLT 72 11/14/2020       CMP:   Lab Results   Component Value Date     11/14/2020    K 4.2 11/14/2020    K 3.8 12/06/2018    CL 99 11/14/2020    CO2 26 11/14/2020    BUN 14 11/14/2020    CREATININE 1.00 11/14/2020    GFRAA 89 11/14/2020    GFRAA >60 03/08/2013    AGRATIO 1.4 10/26/2019    LABGLOM 73 11/14/2020    GLUCOSE 130 11/14/2020    PROT 6.8 10/26/2019    PROT 7.5 03/08/2013    CALCIUM 9.6 11/14/2020    BILITOT 1.7 10/26/2019    ALKPHOS 86 10/26/2019    AST 33 11/14/2020    ALT 33 11/14/2020       POC Tests:   Recent Labs     12/10/21  1112   POCGLU 153*       Coags:   Lab Results   Component Value Date    PROTIME 13.0 11/30/2018    INR 1.14 11/30/2018       HCG (If Applicable): No results found for: PREGTESTUR, PREGSERUM, HCG, HCGQUANT     ABGs:   Lab Results   Component Value Date    PHART 7.219 12/11/2015    PO2ART 414 12/11/2015    WLM6HOC 68 12/11/2015    EIT3UUS 27.8 12/11/2015    BEART 0 12/11/2015    B8LPRNGT 100 12/11/2015        Type & Screen (If Applicable):  No results found for: SANDYO, [address]    Anesthesia Evaluation  Patient summary reviewed and Nursing notes reviewed no history of anesthetic complications:   Airway: Mallampati: III  TM distance: >3 FB   Neck ROM: full  Mouth opening: > = 3 FB Dental: normal exam         Pulmonary:Negative Pulmonary ROS and normal exam  breath sounds clear to auscultation      (-) COPD, asthma, sleep apnea and not a current smoker                           Cardiovascular:    (+) hypertension:, past MI (NSTEMI 2018):, CAD (Cath 12/3/18 RCA 70-85% and LAD 40% blockages, normal EF; neg stress test 2021):, CABG/stent (PTCA RCA 2018):, hyperlipidemia    (-) dysrhythmias,  angina and  CHF    ECG reviewed  Rhythm: regular  Rate: normal  Echocardiogram reviewed  Stress test reviewed  Cleared by cardiology     Beta Blocker:  Dose within 24 Hrs      ROS comment: Per cards, GI service to proceed with colonoscopy and endoscopy to evaluate for GI blood loss prior to proceeding with intervention of RCA     Neuro/Psych:   (+) neuromuscular disease (lumbar ss):,    (-) seizures, TIA and CVA           GI/Hepatic/Renal:   (+) liver disease (esophageal varices, mild portal hypertensive gastropathy / portal HTN with cirrhosis):,      (-) GERD and no renal disease       Endo/Other:    (+) Diabetes (a1c 7.2)Type II DM, , blood dyscrasia (Fe def anemia, chronic ITP): arthritis: OA., .    (-) hypothyroidism, hyperthyroidism               Abdominal:   (+) obese,           Vascular: Other Findings:               Anesthesia Plan      general     ASA 3       Induction: intravenous. MIPS: Postoperative opioids intended and Prophylactic antiemetics administered. Anesthetic plan and risks discussed with patient. Plan discussed with CRNA.                   Tad Silva MD 12/10/2021

## 2021-12-11 NOTE — OP NOTE
[address]                     [facility], [address] [place] [address]                                OPERATIVE REPORT    PATIENT NAME: Kayli Jordan                   :        1950  MED REC NO:   [id number]                          ROOM:  ACCOUNT NO:   [de-identified]                           ADMIT DATE: 12/10/2021  PROVIDER:     Nohelia Ortega MD    DATE OF PROCEDURE:  12/10/2021    PREOPERATIVE DIAGNOSIS:  Recurrent incisional hernia with incarcerated  omentum. POSTOPERATIVE DIAGNOSIS:  Recurrent incisional hernia with incarcerated  omentum. PROCEDURE:  Laparoscopic repair of recurrent incarcerated incisional  hernia, with Ventralight ST 15 cm mesh. SURGEON:  Nohelia Ortega MD    ANESTHESIA:  General plus local.    ESTIMATED BLOOD LOSS:  Minimal.    COMPLICATIONS:  None. SPECIMENS:  None. INDICATIONS:  The patient is a 40-year-old gentleman presenting with a  tender recurrent abdominal wall incisional hernia. He has had prior  hernia repairs, had mesh placed previously for hernia and has a new  hernia noted to the right and up above the umbilical area. FINDINGS:  In terms surgery findings, adhesions were present in the  area. There was incarcerated omentum present in the area. There were 2  hernia defects noted after reduction and repair was performed with the  Ventralight ST 15 cm mesh. ADDITIONAL DETAILS OF SURGERY:  The patient was brought to the operating  room, placed on the operating table in supine position. Compression  boots were placed and general anesthetic was administered. The abdomen  was prepped and draped sterilely and time-out was done. A left upper  quadrant 5-mm direct entry view port was placed and the abdominal cavity  was insufflated to 15 mmHg pressure. Two 5-mm ports were then placed on  the left side, the middle one of which was merged to 12-mm port later in  the case. Adhesiolysis then performed. Intra-abdominal adhesions were  taken down through the mid abdomen from his prior incisions and there  was a notable finding of old mesh present at the area of the umbilical  region and slightly above this area. Out to the right there, we sharply  dissected omentum present in a recurrent hernia and reduced the hernia  contents. There was some oozing from the omentum, which was controlled  with cautery. All bowel in the area appeared fine. The liver was  inspected briefly and it was noted to be grossly cirrhotic. There were  some adhesions around the liver and these were all left alone. The hernia was then sized for repair and the 15 cm Ventralight ST mesh  was used. Suture was placed in _____ mesh. It was rolled tight and  passed through the 12-mm port. The mesh was unfurled, positioned  centrally and tacked in position with the SecureStrap absorbable tacker  using a complete outer row and complete second inner row as well as a  few additional tacks holding the mesh nicely in place of the repair. The retaining suture was cut. The 12-mm port was removed. This port  site was closed with the PMI catheter and 0 Vicryl ties x3. All  surgical sites were double checked for hemostasis and all appeared  intact with the dissection area, omentum, and the mesh placement tacks. The 5-mm ports were removed. The abdomen was deflated. Skin was closed  at all sites with 4-0 Monocryl suture and Dermabond glue. The patient  was taken to recovery room in stable condition postop.         Ellen Altamirano MD    D: 12/10/2021 13:12:37       T: 12/10/2021 13:16:10     [email or web address]  Job#: [id number]     Doc#: [id number]    CC:  Marvin Gardner

## 2021-12-15 NOTE — PROGRESS NOTES
[facility]   Cardiac Follow up     Referring Provider:  Sebastian Melendez     Chief Complaint   Patient presents with    6 Month Follow-Up    Coronary Artery Disease    Hypertension    Hyperlipidemia      History of Present Illness:  Carl Bryan a history of NSTEMI / CAD s/p PTCA RCA (12/2018), hypertension, and hyperlipidemia. His other history includes: JOSE LUIS, esophageal varices, mild portal hypertensive gastropathy / portal HTN with cirrhosis and ITP. Nuclear stress test in June was without ischemia. Hipolito Welsh underwent hernia repair with Dr. Elsa Fabian this month without significant complication. He also continues to follow with GI and had EGD in September. Today, he reports he is feeling well. Patient denies chest pain, shortness of breath, palpitations, or dizziness. He reports at the time of his 2018 stenting he was having dyspnea, and denies recurrence of this. States his appetite has been down due to recent surgery and sleep cycle has been off. His wife has lung cancer (nonsmoker), and is stable by his description, however due to caring for her, he has not been able to maintain his walking regimen. Past Medical History:   has a past medical history of Arthritis, CAD (coronary artery disease), History of blood transfusion, Hyperlipidemia, Hypertension, MARIE (nonalcoholic steatohepatitis), Osteoarthritis, Spinal stenosis, Thrombocytopenia ([address] Type 2 diabetes mellitus without complication ([address] Type II or unspecified type diabetes mellitus without mention of complication, not stated as uncontrolled, and Wears glasses. Surgical History:   has a past surgical history that includes cyst removal (Right); Colonoscopy (12/04/2018); Upper gastrointestinal endoscopy (12/04/2018); Upper gastrointestinal endoscopy (N/A, 12/4/2018); Colonoscopy (N/A, 12/4/2018); Coronary angioplasty with stent (12/04/2018); Upper gastrointestinal endoscopy (N/A, 3/6/2019);  Upper gastrointestinal endoscopy (N/A, 4/3/2019); Upper gastrointestinal endoscopy (N/A, 6/5/2019); Inguinal hernia repair (Left, 12/11/15); Hand surgery (Right, 2013); ventral hernia repair (N/A, 11/18/2019); Upper gastrointestinal endoscopy (N/A, 12/18/2019); back surgery (11/25/2016); back surgery (2020); Upper gastrointestinal endoscopy (N/A, 9/2/2020); Upper gastrointestinal endoscopy (N/A, 3/3/2021); Upper gastrointestinal endoscopy (N/A, 9/1/2021); and ventral hernia repair (N/A, 12/10/2021). Social History:   reports that he has never smoked. He has never used smokeless tobacco. He reports previous alcohol use. He reports that he does not use drugs. Family History:  family history includes Diabetes in his brother and father; Heart Disease in his father; Kidney Disease in his mother; No Known Problems in his brother. Home Medications:  Prior to Admission medications    Medication Sig Start Date End Date Taking? Authorizing Provider   HYDROcodone-acetaminophen (NORCO) 5-325 MG per tablet Take 1 tablet by mouth every 4 hours as needed for Pain for up to 7 days.  12/10/21 12/17/21 Yes Balaji Mckeon MD   vitamin B-12 (CYANOCOBALAMIN) 1000 MCG tablet Take 1,000 mcg by mouth daily   Yes Historical Provider, MD   potassium chloride (KLOR-CON M) 20 MEQ extended release tablet Take 20 mEq by mouth daily 11/13/20  Yes Historical Provider, MD   lansoprazole (PREVACID) 30 MG delayed release capsule Take 30 mg by mouth daily   Yes Historical Provider, MD   methocarbamol (ROBAXIN) 750 MG tablet Take 750 mg by mouth every 8 hours as needed 4/1/19  Yes Historical Provider, MD   aspirin 81 MG tablet Take 1 tablet by mouth daily 4/22/19  Yes Kizzy Barnhart MD   atorvastatin (LIPITOR) 40 MG tablet Take 1 tablet by mouth nightly 1/30/19  Yes PATRICK Smith - CNP   glipiZIDE (GLUCOTROL) 10 MG tablet Take 10 mg by mouth 2 times daily (before meals)   Yes Historical Provider, MD   metFORMIN (GLUCOPHAGE) 1000 MG tablet Take 1 tablet by mouth 2 times daily (with meals) 12/7/18  Yes Tapan Hopkins MD   nadolol (CORGARD) 80 MG tablet Take 1 tablet by mouth daily  Patient taking differently: Take 40 mg by mouth daily 40 mg daily 12/6/18  Yes Tapan Hopkins MD   ferrous sulfate 325 (65 Fe) MG tablet Take 325 mg by mouth 2 times daily    Yes Historical Provider, MD   losartan-hydrochlorothiazide (HYZAAR) 100-25 MG per tablet Take 1 tablet by mouth daily  Patient taking differently: Take 1 tablet by mouth daily inafternoon 9/6/17  Yes Will Dey MD   Multiple Vitamins-Minerals (MULTIVITAMIN PO) Take 1 tablet by mouth daily   Yes Historical Provider, MD   CINNAMON PO Take 1,000 mg by mouth 2 times daily   Yes Historical Provider, MD   glucose blood VI test strips (FREESTYLE LITE) strip Test blood sugar once daily. 10/6/14  Yes Will Dey MD        Allergies:  Patient has no known allergies. Review of Systems:   · Constitutional: there has been no unanticipated weight loss. There's been no change in energy level, sleep pattern, or activity level. · Eyes: No visual changes or diplopia. No scleral icterus. · ENT: No Headaches, hearing loss or vertigo. No mouth sores or sore throat. · Cardiovascular: Reviewed in HPI  · Respiratory: No cough or wheezing, no sputum production. No hematemesis. · Gastrointestinal: No abdominal pain, appetite loss, blood in stools. No change in bowel or bladder habits. · Genitourinary: No dysuria, trouble voiding, or hematuria. · Musculoskeletal:  No gait disturbance, weakness or joint complaints. · Integumentary: No rash or pruritis. · Neurological: No headache, diplopia, change in muscle strength, numbness or tingling. No change in gait, balance, coordination, mood, affect, memory, mentation, behavior. · Psychiatric: No anxiety, no depression. · Endocrine: No malaise, fatigue or temperature intolerance. No excessive thirst, fluid intake, or urination.  No tremor. · Hematologic/Lymphatic: No abnormal bruising or bleeding, blood clots or swollen lymph nodes. · Allergic/Immunologic: No nasal congestion or hives. Physical Examination:    Vitals:    12/17/21 0744   BP: 134/62   Pulse: 67   SpO2: 98%        Wt Readings from Last 1 Encounters:   12/17/21 255 lb (115.7 kg)       Constitutional and General Appearance: NAD  Skin:good turgor,intact without lesions  HEENT: EOMI ,normal  Neck:no JVD    Respiratory:  · Normal excursion and expansion without use of accessory muscles  · Resp Auscultation: Normal breath sounds without dullness  Cardiovascular:  · The apical impulses not displaced  · Heart tones are crisp and normal  · Cervical veins are not engorged  · The carotid upstroke is normal in amplitude and contour without delay or bruit  · Peripheral pulses are symmetrical and full  · There is no clubbing, cyanosis of the extremities. · No edema  · Femoral Arteries: 2+ and equal  · Pedal Pulses: 2+ and equal   Abdomen:  · No masses or tenderness  · Liver/Spleen: No Abnormalities Noted  Neurological/Psychiatric:  · Alert and oriented in all spheres  · Moves all extremities well  · Exhibits normal gait balance and coordination  · No abnormalities of mood, affect, memory, mentation, or behavior are noted    Abd u/s 12/4/2018  Aorta is poorly visualized with normal caliber proximally and in the    midportion. [facility] 12/5/18   Patient with markedly abnormal stress with inferior ischemia. Cath done with 75% prox RCA,85% distal RCA. Now post GI w/up with varices identified. GI service cleared him for dual antiplatelet therapy ,now post transfusion     PCI with VOLODYMYR x 2 in prox and distal RCA. He will need dual antiplatelet therapy, prefer for 6 months but at least 1 month    Stress echo 11/14/2019  -Normal stress echocardiogram study.   -Target heart rate not achieved.   -Normal left ventricle size, wall thickness, and systolic function with an estimated ejection fraction of 55%. There is concentric left ventricular hypertrophy.   -Mild mitral regurgitation.   -Trivial aortic regurgitation.   -Mild tricuspid regurgitation with PASP of 30 mmHg. SPECT 6/18/21  Summary    Normal myocardial perfusion study. Normal LV size and systolic function. Assessment:    1. Coronary artery disease involving native coronary artery of native heart without angina pectoris  H(X) NSTEMI: metoprolol changed to nadolol (cirrhosis & varices)  Not on Plavix due to his bleeding issues, but tolerates baby aspirin. s/p PTCA prox & distal RCA Dec 2018  Stress echo 11/14/2019> Normal, EF 55%  SPECT 6/2021 without ischemia    No medication changes today- continue current management   Denies anginal recurrence which he reports was dyspnea at time of 2018 stenting. 2.Essential hypertension  /62 (Site: Left Upper Arm, Position: Sitting, Cuff Size: Large Adult)   Pulse 67   Ht 6' 2\" (1.88 m)   Wt 255 lb (115.7 kg)   SpO2 98%   BMI 32.74 kg/m²   Controlled today    3. Mixed hyperlipidemia    8/2021: , , HDL 32, LDL 62  AST 37 ALT 37  Well controlled on Lipitor 40 mg  Recheck prior to next visit    4. DM  A1C 7.2 (8/2021)  NID  Managed by PCP      Plan:    Nevin Crocker has a stable cardiac status. Cardiac test and lab results personally reviewed by me during this office visit and discussed. No med changes. Labwork before next visit. Lipids ordered. Continue risk factor modifications. Call for any change in symptoms. Return for regular follow up in 6 months. I appreciate the opportunity of cooperating in the care of this individual.    Michelle Miller. Ryanne Reis M.D., [facility] [place]    Patient's problem list, medications, allergies, past medical, surgical, social and family histories were reviewed and updated as appropriate. Scribe's Attestation: This note was scribed in the presence of Dr. Ryanne Reis MD by Jose Reyes RN.     The scribe's documentation has been prepared under my direction and personally reviewed by me in its entirety. I confirm that the note above accurately reflects all work, treatment, procedures, and medical decision making performed by me.

## 2021-12-17 ENCOUNTER — OFFICE VISIT (OUTPATIENT)
Dept: CARDIOLOGY CLINIC | Age: 71
End: 2021-12-17
Payer: MEDICARE

## 2021-12-17 VITALS
HEIGHT: 74 IN | SYSTOLIC BLOOD PRESSURE: 134 MMHG | BODY MASS INDEX: 32.73 KG/M2 | HEART RATE: 67 BPM | WEIGHT: 255 LBS | OXYGEN SATURATION: 98 % | DIASTOLIC BLOOD PRESSURE: 62 MMHG

## 2021-12-17 DIAGNOSIS — E78.49 OTHER HYPERLIPIDEMIA: ICD-10-CM

## 2021-12-17 DIAGNOSIS — I10 PRIMARY HYPERTENSION: ICD-10-CM

## 2021-12-17 DIAGNOSIS — I25.10 CORONARY ARTERY DISEASE INVOLVING NATIVE CORONARY ARTERY OF NATIVE HEART WITHOUT ANGINA PECTORIS: Primary | ICD-10-CM

## 2021-12-17 PROCEDURE — 4040F PNEUMOC VAC/ADMIN/RCVD: CPT | Performed by: INTERNAL MEDICINE

## 2021-12-17 PROCEDURE — 3017F COLORECTAL CA SCREEN DOC REV: CPT | Performed by: INTERNAL MEDICINE

## 2021-12-17 PROCEDURE — G8427 DOCREV CUR MEDS BY ELIG CLIN: HCPCS | Performed by: INTERNAL MEDICINE

## 2021-12-17 PROCEDURE — G8417 CALC BMI ABV UP PARAM F/U: HCPCS | Performed by: INTERNAL MEDICINE

## 2021-12-17 PROCEDURE — 99214 OFFICE O/P EST MOD 30 MIN: CPT | Performed by: INTERNAL MEDICINE

## 2021-12-17 PROCEDURE — 1036F TOBACCO NON-USER: CPT | Performed by: INTERNAL MEDICINE

## 2021-12-17 PROCEDURE — G8484 FLU IMMUNIZE NO ADMIN: HCPCS | Performed by: INTERNAL MEDICINE

## 2021-12-17 PROCEDURE — 1123F ACP DISCUSS/DSCN MKR DOCD: CPT | Performed by: INTERNAL MEDICINE

## 2021-12-17 NOTE — PATIENT INSTRUCTIONS
No medication changes  Call with questions or concerns  Follow up in 6 months    Lab work before next visit. Cholesterol panel ordered.

## 2021-12-23 ENCOUNTER — OFFICE VISIT (OUTPATIENT)
Dept: SURGERY | Age: 71
End: 2021-12-23

## 2021-12-23 VITALS — WEIGHT: 253 LBS | SYSTOLIC BLOOD PRESSURE: 121 MMHG | BODY MASS INDEX: 32.48 KG/M2 | DIASTOLIC BLOOD PRESSURE: 62 MMHG

## 2021-12-23 DIAGNOSIS — Z09 POSTOP CHECK: Primary | ICD-10-CM

## 2021-12-23 PROBLEM — E11.9 TYPE 2 DIABETES MELLITUS (HCC): Status: ACTIVE | Noted: 2021-12-23

## 2021-12-23 PROCEDURE — 99024 POSTOP FOLLOW-UP VISIT: CPT | Performed by: SURGERY

## 2021-12-23 NOTE — PROGRESS NOTES
[facility]              PATIENT NAME: Pedro Carmen     TODAY'S DATE: 12/23/2021    SUBJECTIVE:      Pt. returns to the office today following a laparoscopic ventral incisional hernia repair with mesh. He had surgery on 12/10/21 at [facility]. He has been recovering well to date, with progression towards normal activity and diet. He has no complaints today. OBJECTIVE:   VITALS:  Wt 253 lb (114.8 kg)   BMI 32.48 kg/m²                                  CONSTITUTIONAL:  awake and alert  ABDOMEN:   Hernia repair is intact, normal bowel sounds, soft, non-distended, non-tender   INCISIONS: clean, dry, no drainage, skin with diffuse ecchymosis        ASSESSMENT AND PLAN:  66 y.o. male status post  ventral incisional hernia repair. His recovery is progressing uneventfully, ecchymosis will resolve on it's own, and he is released to progressive normal activity. He will call or return for any additional problems or questions.       Aimee Miller MD

## 2022-02-18 RX ORDER — LOSARTAN POTASSIUM 100 MG/1
100 TABLET ORAL DAILY
COMMUNITY

## 2022-02-18 RX ORDER — SPIRONOLACTONE 50 MG/1
50 TABLET, FILM COATED ORAL DAILY
COMMUNITY

## 2022-02-18 NOTE — PROGRESS NOTES
SAFETY FIRST. .call before you fall      [address]  time_____0600_______        Surgery time_____0730_______    Take the following medications with a sip of water:    Do not eat or drink anything after 12:00 midnight prior to your surgery. This includes water chewing gum, mints and ice chips. You may brush your teeth and gargle the morning of your surgery, but do not swallow the water     Please see your family doctor/pediatrician for a history and physical and/or concerning medications. Bring any test results/reports from your physicians office. If you are under the care of a heart doctor or specialist doctor, please be aware that you may be asked to them for clearance    You may be asked to stop blood thinners such as Coumadin, Plavix, Fragmin, Lovenox, etc., or any anti-inflammatories such as:  Aspirin, Ibuprofen, Advil, Naproxen prior to your surgery. We also ask that you stop any OTC medications such as fish oil, vitamin E, glucosamine, garlic, Multivitamins, COQ 10, etc.    We ask that you do not smoke 24 hours prior to surgery  We ask that you do not  drink any alcoholic beverages 24 hours prior to surgery     You must make arrangements for a responsible adult to take you home after your surgery. For your safety you will not be allowed to leave alone or drive yourself home. Your surgery will be cancelled if you do not have a ride home. Also for your safety, it is strongly suggested that someone stay with you the first 24 hours after your surgery. A parent or legal guardian must accompany a child scheduled for surgery and plan to stay at the hospital until the child is discharged. Please do not bring other children with you. For your comfort, please wear simple loose fitting clothing to the hospital.  Please do not bring valuables.     Do not wear any make-up or nail polish on your fingers or toes      For your safety, please do not wear any jewelry or body piercing's on the day of surgery. All jewelry must be removed. If you have dentures, they will be removed before going to operating room. For your convenience, we will provide you with a container. If you wear contact lenses or glasses, they will be removed, please bring a case for them. If you have a living will and a durable power of  for healthcare, please bring in a copy. As part of our patient safety program to minimize surgical site infections, we ask you to do the following:    · Please notify your surgeon if you develop any illness between         now and the  day of your surgery. · This includes a cough, cold, fever, sore throat, nausea,         or vomiting, and diarrhea, etc.  ·  Please notify your surgeon if you experience dizziness, shortness         of breath or blurred vision between now and the time of your surgery. Do not shave your operative site 96 hours prior to surgery. For face and neck surgery, men may use an electric razor 48 hours   prior to surgery. You may shower the night before surgery or the morning of   your surgery with an antibacterial soap. You will need to bring a photo ID and insurance card    [facility] has an onsite pharmacy, would you like to utilize our pharmacy     If you will be staying overnight and use a C-pap machine, please bring   your C-pap to hospital     Our goal is to provide you with excellent care, therefore, visitors will be limited to two(2) in the room at a time so that we may focus on providing this care for you. Please contact pre-admission testing if you have any further questions. [facility] phone number:  [address] [facility] fax number:  [phone]  Please note these are generalized instructions for all surgical cases, you may be provided with more specific instructions according to your surgery.

## 2022-03-01 ENCOUNTER — ANESTHESIA EVENT (OUTPATIENT)
Dept: ENDOSCOPY | Age: 72
End: 2022-03-01
Payer: MEDICARE

## 2022-03-02 ENCOUNTER — HOSPITAL ENCOUNTER (OUTPATIENT)
Age: 72
Setting detail: OUTPATIENT SURGERY
Discharge: HOME | End: 2022-03-02
Attending: INTERNAL MEDICINE | Admitting: INTERNAL MEDICINE
Payer: MEDICARE

## 2022-03-02 ENCOUNTER — ANESTHESIA (OUTPATIENT)
Dept: ENDOSCOPY | Age: 72
End: 2022-03-02
Payer: MEDICARE

## 2022-03-02 ENCOUNTER — HOSPITAL ENCOUNTER (OUTPATIENT)
Dept: ENDOSCOPY | Age: 72
Setting detail: OUTPATIENT SURGERY
Discharge: HOME | End: 2022-03-02

## 2022-03-02 VITALS
RESPIRATION RATE: 16 BRPM | HEIGHT: 74 IN | OXYGEN SATURATION: 100 % | SYSTOLIC BLOOD PRESSURE: 150 MMHG | TEMPERATURE: 96.8 F | DIASTOLIC BLOOD PRESSURE: 66 MMHG | BODY MASS INDEX: 31.79 KG/M2 | HEART RATE: 63 BPM | WEIGHT: 247.69 LBS

## 2022-03-02 VITALS — OXYGEN SATURATION: 95 % | DIASTOLIC BLOOD PRESSURE: 68 MMHG | SYSTOLIC BLOOD PRESSURE: 144 MMHG

## 2022-03-02 LAB
GLUCOSE BLD-MCNC: 132 MG/DL (ref 70–99)
GLUCOSE BLD-MCNC: 152 MG/DL (ref 70–99)
PERFORMED ON: ABNORMAL
PERFORMED ON: ABNORMAL
SARS-COV-2, NAAT: NOT DETECTED

## 2022-03-02 PROCEDURE — 7100000011 HC PHASE II RECOVERY - ADDTL 15 MIN: Performed by: INTERNAL MEDICINE

## 2022-03-02 PROCEDURE — 6360000002 HC RX W HCPCS: Performed by: NURSE ANESTHETIST, CERTIFIED REGISTERED

## 2022-03-02 PROCEDURE — 7100000000 HC PACU RECOVERY - FIRST 15 MIN: Performed by: INTERNAL MEDICINE

## 2022-03-02 PROCEDURE — 2709999900 HC NON-CHARGEABLE SUPPLY: Performed by: INTERNAL MEDICINE

## 2022-03-02 PROCEDURE — 3700000000 HC ANESTHESIA ATTENDED CARE: Performed by: INTERNAL MEDICINE

## 2022-03-02 PROCEDURE — 3609017100 HC EGD: Performed by: INTERNAL MEDICINE

## 2022-03-02 PROCEDURE — 87635 SARS-COV-2 COVID-19 AMP PRB: CPT

## 2022-03-02 PROCEDURE — 7100000010 HC PHASE II RECOVERY - FIRST 15 MIN: Performed by: INTERNAL MEDICINE

## 2022-03-02 PROCEDURE — 2500000003 HC RX 250 WO HCPCS: Performed by: NURSE ANESTHETIST, CERTIFIED REGISTERED

## 2022-03-02 PROCEDURE — 2580000003 HC RX 258: Performed by: NURSE ANESTHETIST, CERTIFIED REGISTERED

## 2022-03-02 PROCEDURE — 2580000003 HC RX 258: Performed by: ANESTHESIOLOGY

## 2022-03-02 RX ORDER — PROPOFOL 10 MG/ML
INJECTION, EMULSION INTRAVENOUS PRN
Status: DISCONTINUED | OUTPATIENT
Start: 2022-03-02 | End: 2022-03-02 | Stop reason: SDUPTHER

## 2022-03-02 RX ORDER — GLYCOPYRROLATE 0.2 MG/ML
INJECTION INTRAMUSCULAR; INTRAVENOUS PRN
Status: DISCONTINUED | OUTPATIENT
Start: 2022-03-02 | End: 2022-03-02 | Stop reason: SDUPTHER

## 2022-03-02 RX ORDER — SODIUM CHLORIDE 9 MG/ML
INJECTION, SOLUTION INTRAVENOUS CONTINUOUS PRN
Status: DISCONTINUED | OUTPATIENT
Start: 2022-03-02 | End: 2022-03-02 | Stop reason: SDUPTHER

## 2022-03-02 RX ORDER — PROPOFOL 10 MG/ML
INJECTION, EMULSION INTRAVENOUS CONTINUOUS PRN
Status: DISCONTINUED | OUTPATIENT
Start: 2022-03-02 | End: 2022-03-02 | Stop reason: SDUPTHER

## 2022-03-02 RX ORDER — SODIUM CHLORIDE 9 MG/ML
25 INJECTION, SOLUTION INTRAVENOUS PRN
Status: DISCONTINUED | OUTPATIENT
Start: 2022-03-02 | End: 2022-03-02 | Stop reason: HOSPADM

## 2022-03-02 RX ORDER — SODIUM CHLORIDE 0.9 % (FLUSH) 0.9 %
5-40 SYRINGE (ML) INJECTION PRN
Status: DISCONTINUED | OUTPATIENT
Start: 2022-03-02 | End: 2022-03-02 | Stop reason: HOSPADM

## 2022-03-02 RX ORDER — SODIUM CHLORIDE 0.9 % (FLUSH) 0.9 %
5-40 SYRINGE (ML) INJECTION EVERY 12 HOURS SCHEDULED
Status: DISCONTINUED | OUTPATIENT
Start: 2022-03-02 | End: 2022-03-02 | Stop reason: HOSPADM

## 2022-03-02 RX ORDER — LIDOCAINE HYDROCHLORIDE 20 MG/ML
INJECTION, SOLUTION EPIDURAL; INFILTRATION; INTRACAUDAL; PERINEURAL PRN
Status: DISCONTINUED | OUTPATIENT
Start: 2022-03-02 | End: 2022-03-02 | Stop reason: SDUPTHER

## 2022-03-02 RX ADMIN — PROPOFOL 180 MCG/KG/MIN: 10 INJECTION, EMULSION INTRAVENOUS at 07:51

## 2022-03-02 RX ADMIN — LIDOCAINE HYDROCHLORIDE 80 MG: 20 INJECTION, SOLUTION EPIDURAL; INFILTRATION; INTRACAUDAL; PERINEURAL at 07:51

## 2022-03-02 RX ADMIN — PROPOFOL 100 MG: 10 INJECTION, EMULSION INTRAVENOUS at 07:51

## 2022-03-02 RX ADMIN — SODIUM CHLORIDE 25 ML: 9 INJECTION, SOLUTION INTRAVENOUS at 06:37

## 2022-03-02 RX ADMIN — GLYCOPYRROLATE 0.2 MG: 0.2 INJECTION, SOLUTION INTRAMUSCULAR; INTRAVENOUS at 07:46

## 2022-03-02 RX ADMIN — SODIUM CHLORIDE: 9 INJECTION, SOLUTION INTRAVENOUS at 07:46

## 2022-03-02 ASSESSMENT — PULMONARY FUNCTION TESTS
PIF_VALUE: 0

## 2022-03-02 ASSESSMENT — PAIN SCALES - GENERAL
PAINLEVEL_OUTOF10: 0

## 2022-03-02 ASSESSMENT — PAIN - FUNCTIONAL ASSESSMENT: PAIN_FUNCTIONAL_ASSESSMENT: 0-10

## 2022-03-02 NOTE — PROGRESS NOTES
Pt arrived to PACU from Endoscopy unit. Pt asleep on room air, awakens easily. Abd soft. Denies c/o pain.

## 2022-03-02 NOTE — PROCEDURES
[address]                                 PROCEDURE NOTE    PATIENT NAME: Zee Johnson                   :        1950  MED REC NO:   [id number]                          ROOM:  ACCOUNT NO:   [de-identified]                           ADMIT DATE: 2022  PROVIDER:     Rafita Cota MD    EGD    DATE OF PROCEDURE:  2022    REFERRING PROVIDER:  Shayne Wilkins MD    PATIENT HISTORY:  A 75-year-old male, outpatient. INSTRUMENT USED:  Olympus GIF-Q180. ANESTHESIA:  The patient was premedicated with Diprivan intravenously as  administered by the anesthesiology service. INDICATIONS:  The patient has a history of cirrhosis on the basis of  nonalcoholic steatohepatitis. He undergoes regular EGDs for esophageal  variceal screening. PROCEDURE:  The endoscope was inserted into the esophagus without  difficulty. Again noted were small distal variceal channels that did  not require banding. More prominent varices were noted in the mid  esophagus. The Z-line was located at 42 to 43 cm. In the stomach,  there was persistent antral nodularity. There were no gastric varices. There was very mild portal hypertensive gastropathy. The duodenal bulb  and descending duodenum were normal.    ESTIMATED BLOOD LOSS:  None. IMPRESSION:  1. Small distal esophageal varices that did not require banding. 2.  More prominent mid esophageal varices. 3.  No evidence of gastric varices. 4.  Mild portal hypertensive gastropathy. 5.  Persistent antral nodularity. PLAN:  The patient should undergo a followup EGD in six months. KEITH Woods MD    D: 2022 8:13:36       T: 2022 8:17:01     [id number]  Job#: [id number]     Doc#: [id number]    CC:  Rafita Cota MD

## 2022-03-02 NOTE — ANESTHESIA POSTPROCEDURE EVALUATION
Department of Anesthesiology  Postprocedure Note    Patient: Marybeth Pierre  MRN: [id number]  YOB: 1950  Date of evaluation: 3/2/2022  Time:  1:28 PM     Procedure Summary     Date: 03/02/22 Room / Location: [address] [place] [address]    Anesthesia Start: 3498 Anesthesia Stop: 0800    Procedure: ESOPHAGOGASTRODUODENOSCOPY (N/A ) Diagnosis:       Non-alcoholic cirrhosis ([address]      (NON-ALCOHOLIC CIRRHOSIS, ESOPHAGEAL VARICES)    Surgeons: Rosetta Agarwal MD Responsible Provider: Radha Carrizales MD    Anesthesia Type: MAC ASA Status: 3          Anesthesia Type: MAC    Queenie Phase I: Queenie Score: 9    Queenie Phase II: Queenie Score: 10    Last vitals: Reviewed and per EMR flowsheets.        Anesthesia Post Evaluation    Patient location during evaluation: PACU  Level of consciousness: awake and alert  Airway patency: patent  Nausea & Vomiting: no nausea and no vomiting  Complications: no  Cardiovascular status: blood pressure returned to baseline  Respiratory status: acceptable  Hydration status: euvolemic  Comments: Postoperative Anesthesia Note    Name:    Marybeth Pierre  MRN:      [id number]    Patient Vitals in the past 12 hrs:  03/02/22 0845, BP:(!) 150/66, Pulse:63, Resp:16, SpO2:100 %  03/02/22 0810, BP:127/63, Temp:96.8 °F (36 °C), Temp src:Temporal, Pulse:73, Resp:12, SpO2:99 %  03/02/22 0805, BP:(!) 110/52, Pulse:70, Resp:16, SpO2:100 %  03/02/22 0800, BP:(!) 123/54, Pulse:80, Resp:(!) 35, SpO2:100 %  03/02/22 0758, BP:129/64, Temp:96.7 °F (35.9 °C), Temp src:Temporal, Pulse:78, Resp:(!) 33, SpO2:95 %  03/02/22 0620, BP:(!) 150/64, Temp:97.8 °F (36.6 °C), Temp src:Temporal, Pulse:69, Resp:16, SpO2:100 %, Height:6' 2\" (1.88 m), Weight:247 lb 11 oz (112.4 kg)     LABS:    CBC  Lab Results       Component                Value               Date/Time                  WBC                      5.3                 12/10/2021 11:10 AM        HGB                      13.6 12/10/2021 11:10 AM        HCT                      39.6 (L)            12/10/2021 11:10 AM        PLT                      84 (L)              12/10/2021 11:10 AM   RENAL  Lab Results       Component                Value               Date/Time                  NA                       133 (L)             12/10/2021 11:10 AM        K                        3.9                 12/10/2021 11:10 AM        K                        3.8                 12/06/2018 05:00 AM        CL                       96 (L)              12/10/2021 11:10 AM        CO2                      26                  12/10/2021 11:10 AM        BUN                      13                  12/10/2021 11:10 AM        CREATININE               1.1                 12/10/2021 11:10 AM        GLUCOSE                  157 (H)             12/10/2021 11:10 AM   COAGS  Lab Results       Component                Value               Date/Time                  PROTIME                  13.0                11/30/2018 04:19 PM        INR                      1.14                11/30/2018 04:19 PM     Intake & Output:  @04ESFW@    Nausea & Vomiting:  No    Level of Consciousness:  Awake    Pain Assessment:  Adequate analgesia    Anesthesia Complications:  No apparent anesthetic complications    SUMMARY      Vital signs stable  OK to discharge from Stage I post anesthesia care.   Care transferred from Anesthesiology department on discharge from perioperative area

## 2022-03-02 NOTE — BRIEF OP NOTE
Brief Postoperative Note    Darrel Redman  YOB: 1950  [id number]    Pre-operative Diagnosis: Variceal screening    Post-operative Diagnosis: Same    Procedure: EGD    Anesthesia: MAC    Surgeons/Assistants: Mary Aponte MD    Estimated Blood Loss: None    Complications: None    Specimens: Was Not Obtained    Findings: See dictated report    Electronically signed by Mary Aponte MD on 3/2/2022 at 8:03 AM

## 2022-03-02 NOTE — ANESTHESIA PRE PROCEDURE
[facility] Department of Anesthesiology  Pre-Anesthesia Evaluation/Consultation       Name:  Nhung Arevalo  : 1950  Age:  66 y.o.                                            MRN:  [id number]  Date: 3/2/2022           Surgeon: Gm Reyes):  Mikayla Herrera MD    Procedure: Procedure(s):  ESOPHAGOGASTRODUODENOSCOPY     No Known Allergies  Patient Active Problem List   Diagnosis    Hypertension    Cyst of joint of hand    High triglycerides    Dupuytren's contracture of right hand    Rosacea    Thrombocytopenia (HCC)    Chronic ITP (idiopathic thrombocytopenia) (HCC)    Iron deficiency anemia    Anemia, iron deficiency    Chest pain    Non-STEMI (non-ST elevated myocardial infarction) ([facility] [address]    CAD (coronary artery disease)    Hyperlipidemia    Incarcerated ventral hernia    Recurrent incisional hernia with incarceration    Type 2 diabetes mellitus     Past Medical History:   Diagnosis Date    Arthritis     CAD (coronary artery disease)     History of blood transfusion     Hyperlipidemia     Hypertension     MARIE (nonalcoholic steatohepatitis)     Osteoarthritis     Spinal stenosis     Thrombocytopenia (HCC)     Type 2 diabetes mellitus without complication ([facility] [address]     Type II or unspecified type diabetes mellitus without mention of complication, not stated as uncontrolled     type 2    Wears glasses      Past Surgical History:   Procedure Laterality Date    BACK SURGERY  2016    L3, L4,L5 remove spurs    BACK SURGERY      L2, L3 released pressure from nerve    COLONOSCOPY  2018    wiht polypectomy x3    COLONOSCOPY N/A 2018    COLONOSCOPY POLYPECTOMY SNARE/COLD BIOPSY performed by Edelmira Kauffman MD at [facility]  12/04/2018    x2 stents-70% and 85% blocked    CYST REMOVAL Right     on arm    ENDOSCOPY, COLON, DIAGNOSTIC      HAND SURGERY Right     dupuytren syndrome    INGUINAL HERNIA REPAIR Left 12/11/15    laparoscopic    UPPER GASTROINTESTINAL ENDOSCOPY  12/04/2018    with biopsy    UPPER GASTROINTESTINAL ENDOSCOPY N/A 12/4/2018    EGD BIOPSY performed by Damian Torres MD at [address] [place] [address] N/A 3/6/2019    EGD BAND LIGATION performed by Thee Cornejo MD at [facility] N/A 4/3/2019    ESOPHAGOGASTRODUODENOSCOPY WITH BANDING performed by Thee Cornejo MD at [facility] N/A 6/5/2019    ESOPHAGOGASTRODUODENOSCOPY performed by Thee Cornejo MD at [address] [facility] 12/18/2019    EGD ESOPHAGOGASTRODUODENOSCOPY performed by Thee Cornejo MD at [address] [place] [address] 9/2/2020    EGD BIOPSY performed by Thee Cornejo MD at [address] [place] [address] 3/3/2021    ESOPHAGOGASTRODUODENOSCOPY performed by Thee Cornejo MD at [facility] N/A 9/1/2021    ESOPHAGOGASTRODUODENOSCOPY performed by Thee Cornejo MD at [address] [facility] N/A 11/18/2019    OPEN REPAIR OF INCARCERATED VENTRAL HERNIA WITH MESH performed by Chris Bunn MD at [address] [place] [address] N/A 12/10/2021    LAPAROSCOPIC RECURRENT INCISIONAL HERNIA REPAIR performed by Chris Bunn MD at [address] [facility] History     Tobacco Use    Smoking status: Never Smoker    Smokeless tobacco: Never Used   Vaping Use    Vaping Use: Never used   Substance Use Topics    Alcohol use: Not Currently     Alcohol/week: 0.0 standard drinks     Comment: quit drinking 1990's    Drug use: Never     Medications  No current facility-administered medications on file prior to encounter.      Current Outpatient Medications on File Prior to Encounter   Medication Sig Dispense Refill    losartan (COZAAR) 100 MG tablet Take 100 mg by mouth daily      spironolactone (ALDACTONE) 50 MG tablet Take 50 mg by mouth daily      vitamin B-12 (CYANOCOBALAMIN) 1000 MCG tablet Take 1,000 mcg by mouth daily      lansoprazole (PREVACID) 30 MG delayed release capsule Take 30 mg by mouth daily      methocarbamol (ROBAXIN) 750 MG tablet Take 750 mg by mouth every 8 hours as needed      aspirin 81 MG tablet Take 1 tablet by mouth daily 90 tablet 3    atorvastatin (LIPITOR) 40 MG tablet Take 1 tablet by mouth nightly 90 tablet 3    glipiZIDE (GLUCOTROL) 10 MG tablet Take 10 mg by mouth 2 times daily (before meals)      metFORMIN (GLUCOPHAGE) 1000 MG tablet Take 1 tablet by mouth 2 times daily (with meals) 180 tablet 1    nadolol (CORGARD) 80 MG tablet Take 1 tablet by mouth daily (Patient taking differently: Take 40 mg by mouth daily 40 mg daily) 30 tablet 1    ferrous sulfate 325 (65 Fe) MG tablet Take 325 mg by mouth 2 times daily       Multiple Vitamins-Minerals (MULTIVITAMIN PO) Take 1 tablet by mouth daily      CINNAMON PO Take 1,000 mg by mouth 2 times daily      glucose blood VI test strips (FREESTYLE LITE) strip Test blood sugar once daily.  100 each 3     Current Facility-Administered Medications   Medication Dose Route Frequency Provider Last Rate Last Admin    sodium chloride flush 0.9 % injection 5-40 mL  5-40 mL IntraVENous 2 times per day Stephenie Rivera MD        sodium chloride flush 0.9 % injection 5-40 mL  5-40 mL IntraVENous PRN Stephenie Rivera MD        0.9 % sodium chloride infusion  25 mL IntraVENous PRDARLENE Rivera  mL/hr at 22 0637 25 mL at 22 0637     Vital Signs (Current)   Vitals:    22 1423 22 0620   BP:  (!) 150/64   Pulse:  69   Resp:  16   Temp:  97.8 °F (36.6 °C)   TempSrc:  Temporal   SpO2:  100%   Weight: 245 lb (111.1 kg) 247 lb 11 oz (112.4 kg)   Height: 6' 2\" (1.88 m) 6' 2\" (1.88 m)                                            Vital Signs Statistics (for past 48 hrs)     Temp  Av.8 °F (36.6 °C) Min: 97.8 °F (36.6 °C)   Min taken time: 22  Max: 97.8 °F (36.6 °C)   Max taken time: 22  Pulse  Av  Min: 71   Min taken time: 22  Max: 71   Max taken time: 22  Resp  Av  Min: 12   Min taken time: 22  Max: 16   Max taken time: 22  BP  Min: 150/64   Min taken time: 22  Max: 150/64   Max taken time: 22  SpO2  Av %  Min: 100 %   Min taken time: 22  Max: 100 %   Max taken time: 22  BP Readings from Last 3 Encounters:   22 (!) 150/64   21 121/62   21 134/62       BMI  Body mass index is 31.8 kg/m². Estimated body mass index is 31.8 kg/m² as calculated from the following:    Height as of this encounter: 6' 2\" (1.88 m). Weight as of this encounter: 247 lb 11 oz (112.4 kg). CBC   Lab Results   Component Value Date    WBC 5.3 12/10/2021    RBC 4.36 12/10/2021    RBC 4.03 2016    HGB 13.6 12/10/2021    HCT 39.6 12/10/2021    MCV 90.8 12/10/2021    RDW 14.6 12/10/2021    PLT 84 12/10/2021     CMP    Lab Results   Component Value Date     12/10/2021    K 3.9 12/10/2021    K 3.8 2018    CL 96 12/10/2021    CO2 26 12/10/2021    BUN 13 12/10/2021    CREATININE 1.1 12/10/2021    GFRAA >60 12/10/2021    GFRAA >60 2013    AGRATIO 1.4 10/26/2019    LABGLOM >60 12/10/2021    GLUCOSE 157 12/10/2021    PROT 6.8 10/26/2019    PROT 7.5 2013    CALCIUM 9.7 12/10/2021    BILITOT 1.7 10/26/2019    ALKPHOS 86 10/26/2019    AST 33 2020    ALT 33 2020     BMP    Lab Results   Component Value Date     12/10/2021    K 3.9 12/10/2021    K 3.8 2018    CL 96 12/10/2021    CO2 26 12/10/2021    BUN 13 12/10/2021    CREATININE 1.1 12/10/2021    CALCIUM 9.7 12/10/2021    GFRAA >60 12/10/2021    GFRAA >60 2013    LABGLOM >60 12/10/2021    GLUCOSE 157 12/10/2021     POCGlucose  No results for input(s): GLUCOSE in the last 72 hours.    Coa    Lab Results   Component Value Date    PROTIME 13.0 11/30/2018    INR 1.14 54/09/6873     HCG (If Applicable) No results found for: [place], HCG, HCGQUANT   ABGs   Lab Results   Component Value Date    PHART 7.219 12/11/2015    PO2ART 414 12/11/2015    XNJ2TRV 68 12/11/2015    XGJ5IGJ 27.8 12/11/2015    BEART 0 12/11/2015    Z6NFVQMO 100 12/11/2015      Type & Screen (If Applicable)  No results found for: LABABO, LABRH                         BMI: Wt Readings from Last 3 Encounters:       NPO Status:   Date of last liquid consumption: 03/01/22   Time of last liquid consumption: 2000   Date of last solid food consumption: 03/01/22      Time of last solid consumption: 1900       Anesthesia Evaluation    Airway: Mallampati: II        Dental:    (+) upper dentures and lower dentures      Pulmonary:                              Cardiovascular:    (+) hypertension:, past MI:, CAD: no interval change,                   Neuro/Psych:               GI/Hepatic/Renal:   (+) liver disease:,           Endo/Other:    (+) DiabetesType II DM, , : arthritis:., .                 Abdominal:             Vascular: Other Findings:             Anesthesia Plan      MAC     ASA 3     (I discussed intravenous sedation to the patient's satisfaction including risks and alternatives. The patient agreed with the plan and has no further questions. Chu Jackson MD )  Induction: intravenous. Anesthetic plan and risks discussed with patient and child/children. Plan discussed with CRNA. This pre-anesthesia assessment may be used as a history and physical.    DOS STAFF ADDENDUM:    Pt seen and examined, chart reviewed (including anesthesia, drug and allergy history). No interval changes to history and physical examination. Anesthetic plan, risks, benefits, alternatives, and personnel involved discussed with patient. Patient verbalized an understanding and agrees to proceed.       Praveen Sandoval Jayleen Kaur MD  March 2, 2022  7:04 AM

## 2022-03-02 NOTE — H&P
[facility]   Pre-operative History and Physical    Patient: Gifty Roberts  : 1950  Acct#:         HISTORY OF PRESENT ILLNESS:    The patient is a 66 y.o. male  who presents with variceal screening  Past Medical History:        Diagnosis Date    Arthritis     CAD (coronary artery disease)     History of blood transfusion     Hyperlipidemia     Hypertension     MARIE (nonalcoholic steatohepatitis)     Osteoarthritis     Spinal stenosis     Thrombocytopenia (HCC)     Type 2 diabetes mellitus without complication ([facility] [address]     Type II or unspecified type diabetes mellitus without mention of complication, not stated as uncontrolled     type 2    Wears glasses      Past Surgical History:        Procedure Laterality Date    BACK SURGERY  2016    L3, L4,L5 remove spurs    BACK SURGERY      L2, L3 released pressure from nerve    COLONOSCOPY  2018    wiht polypectomy x3    COLONOSCOPY N/A 2018    COLONOSCOPY POLYPECTOMY SNARE/COLD BIOPSY performed by [facility] Hui [facility] at [facility]  12/04/2018    x2 stents-70% and 85% blocked    CYST REMOVAL Right     on arm    ENDOSCOPY, COLON, DIAGNOSTIC      HAND SURGERY Right     dupuytren syndrome    INGUINAL HERNIA REPAIR Left 12/11/15    laparoscopic    UPPER GASTROINTESTINAL ENDOSCOPY  2018    with biopsy    UPPER GASTROINTESTINAL ENDOSCOPY N/A 2018    EGD BIOPSY performed by Cate Hui MD at [address] N/A 3/6/2019    EGD BAND LIGATION performed by Britt Henley MD at [address] N/A 4/3/2019    ESOPHAGOGASTRODUODENOSCOPY WITH BANDING performed by Britt Henley MD at [address] N/A 2019    ESOPHAGOGASTRODUODENOSCOPY performed by Britt Henley MD at [address] N/A 2019 EGD ESOPHAGOGASTRODUODENOSCOPY performed by Lawrence Palmer MD at [address] [facility] 9/2/2020    EGD BIOPSY performed by Lawrence Palmer MD at [address] [facility] 3/3/2021    ESOPHAGOGASTRODUODENOSCOPY performed by Lawrence Palmer MD at [address] [facility] N/A 9/1/2021    ESOPHAGOGASTRODUODENOSCOPY performed by Lawrence Palmer MD at [address] [facility] N/A 11/18/2019    OPEN REPAIR OF INCARCERATED VENTRAL HERNIA WITH MESH performed by Fifi Taveras MD at [address] [place] [address] N/A 12/10/2021    LAPAROSCOPIC RECURRENT INCISIONAL HERNIA REPAIR performed by Fifi Taveras MD at [address] [facility]     Medications prior to admission:   Prior to Admission medications    Medication Sig Start Date End Date Taking?  Authorizing Provider   losartan (COZAAR) 100 MG tablet Take 100 mg by mouth daily   Yes Historical Provider, MD   spironolactone (ALDACTONE) 50 MG tablet Take 50 mg by mouth daily   Yes Historical Provider, MD   vitamin B-12 (CYANOCOBALAMIN) 1000 MCG tablet Take 1,000 mcg by mouth daily   Yes Historical Provider, MD   lansoprazole (PREVACID) 30 MG delayed release capsule Take 30 mg by mouth daily   Yes Historical Provider, MD   methocarbamol (ROBAXIN) 750 MG tablet Take 750 mg by mouth every 8 hours as needed 4/1/19  Yes Historical Provider, MD   aspirin 81 MG tablet Take 1 tablet by mouth daily 4/22/19  Yes Gretta Ramos MD   atorvastatin (LIPITOR) 40 MG tablet Take 1 tablet by mouth nightly 1/30/19  Yes PATRICK Fernandez CNP   glipiZIDE (GLUCOTROL) 10 MG tablet Take 10 mg by mouth 2 times daily (before meals)   Yes Historical Provider, MD   metFORMIN (GLUCOPHAGE) 1000 MG tablet Take 1 tablet by mouth 2 times daily (with meals) 12/7/18  Yes Tre Block MD   nadolol (CORGARD) 80 MG tablet Take 1 tablet by mouth daily  Patient taking differently: Active Member of Clubs or Organizations: Not on file    Attends Club or Organization Meetings: Not on file    Marital Status: Not on file   Intimate Partner Violence:     Fear of Current or Ex-Partner: Not on file    Emotionally Abused: Not on file    Physically Abused: Not on file    Sexually Abused: Not on file   Housing Stability:     Unable to Pay for Housing in the Last Year: Not on file    Number of [place] in the Last Year: Not on file    Unstable Housing in the Last Year: Not on file           Family History:   Family History   Problem Relation Age of Onset    Kidney Disease Mother     Heart Disease Father     Diabetes Father     No Known Problems Brother     Diabetes Brother          PHYSICAL EXAM:      BP (!) 150/64   Pulse 69   Temp 97.8 °F (36.6 °C) (Temporal)   Resp 16   Ht 6' 2\" (1.88 m)   Wt 247 lb 11 oz (112.4 kg)   SpO2 100%   BMI 31.80 kg/m²  I        Heart: Normal    Lungs: Normal    Abdomen: Normal      ASA Grade: ASA 3 - Patient with moderate systemic disease with functional limitations    II (soft palate, uvula, fauces visible)  ASSESSMENT AND PLAN:    1. Patient is a 66 y.o. male here for EGD  2. Procedure options, risks and benefits reviewed with patient who expresses understanding.

## 2022-03-10 ENCOUNTER — INITIAL CONSULT (OUTPATIENT)
Dept: SURGERY | Age: 72
End: 2022-03-10
Payer: MEDICARE

## 2022-03-10 VITALS — BODY MASS INDEX: 31.82 KG/M2 | SYSTOLIC BLOOD PRESSURE: 124 MMHG | DIASTOLIC BLOOD PRESSURE: 60 MMHG | WEIGHT: 247.8 LBS

## 2022-03-10 DIAGNOSIS — K40.90 RIGHT INGUINAL HERNIA: Primary | ICD-10-CM

## 2022-03-10 DIAGNOSIS — Z01.818 PRE-OP TESTING: Primary | ICD-10-CM

## 2022-03-10 PROCEDURE — G8417 CALC BMI ABV UP PARAM F/U: HCPCS | Performed by: SURGERY

## 2022-03-10 PROCEDURE — G8484 FLU IMMUNIZE NO ADMIN: HCPCS | Performed by: SURGERY

## 2022-03-10 PROCEDURE — 99213 OFFICE O/P EST LOW 20 MIN: CPT | Performed by: SURGERY

## 2022-03-10 PROCEDURE — 3017F COLORECTAL CA SCREEN DOC REV: CPT | Performed by: SURGERY

## 2022-03-10 PROCEDURE — 1036F TOBACCO NON-USER: CPT | Performed by: SURGERY

## 2022-03-10 PROCEDURE — 1123F ACP DISCUSS/DSCN MKR DOCD: CPT | Performed by: SURGERY

## 2022-03-10 PROCEDURE — G8427 DOCREV CUR MEDS BY ELIG CLIN: HCPCS | Performed by: SURGERY

## 2022-03-10 PROCEDURE — 4040F PNEUMOC VAC/ADMIN/RCVD: CPT | Performed by: SURGERY

## 2022-03-10 ASSESSMENT — ENCOUNTER SYMPTOMS
ABDOMINAL PAIN: 1
RESPIRATORY NEGATIVE: 1
EYES NEGATIVE: 1
ALLERGIC/IMMUNOLOGIC NEGATIVE: 1

## 2022-03-10 NOTE — LETTER
[address]  [address] [address]  Phone: [phone]  Fax: [phone]    Cedric Redding MD    March 10, 2022     [address]  [address] [place] [address]    Patient: Tai Dunbar   MR Number: [id number]   YOB: 1950   Date of Visit: 3/10/2022       Dear [address]: Thank you for referring Toni Hawkins to me for evaluation/treatment. Below are the relevant portions of my assessment and plan of care. If you have questions, please do not hesitate to call me. I look forward to following [employer] along with you.     Sincerely,      Cedric Redding MD

## 2022-03-10 NOTE — PROGRESS NOTES
[facility]      PATIENT NAME: Gaston Dobbins     TODAY'S DATE: 3/10/2022    Reason for Visit:  Hernia    Requesting Physician:  Self    HISTORY OF PRESENT ILLNESS:              The patient is a 66 y.o. male who presents with concerns of his [facility]. Feels area is enlarging,has increased strain and bulging in the area. He has had symptoms for the past several months. Associated symptoms are swelling and bulging in the scrotal area. The patient has had prior ventral and left inguinal hernia surgery.       Past Medical History:        Diagnosis Date    Arthritis     CAD (coronary artery disease)     History of blood transfusion 2018    Hyperlipidemia     Hypertension     MARIE (nonalcoholic steatohepatitis)     Osteoarthritis     Spinal stenosis     Thrombocytopenia (HCC)     Type 2 diabetes mellitus without complication (HCC)     Type II or unspecified type diabetes mellitus without mention of complication, not stated as uncontrolled     type 2    Wears glasses        Past Surgical History:        Procedure Laterality Date    BACK SURGERY  11/25/2016    L3, L4,L5 remove spurs    BACK SURGERY  2020    L2, L3 released pressure from nerve    COLONOSCOPY  12/04/2018    wiht polypectomy x3    COLONOSCOPY N/A 12/4/2018    COLONOSCOPY POLYPECTOMY SNARE/COLD BIOPSY performed by Arnulfo Sandifer, MD at [facility]  12/04/2018    x2 stents-70% and 85% blocked    CYST REMOVAL Right     on arm    ENDOSCOPY, COLON, DIAGNOSTIC      HAND SURGERY Right 2013    dupuytren syndrome    INGUINAL HERNIA REPAIR Left 12/11/15    laparoscopic    UPPER GASTROINTESTINAL ENDOSCOPY  12/04/2018    with biopsy    UPPER GASTROINTESTINAL ENDOSCOPY N/A 12/4/2018    EGD BIOPSY performed by Arnulfo Sandifer, MD at [address] [place] [address] N/A 3/6/2019    EGD BAND LIGATION performed by Charlene Rogers MD at [address] [place] N/A 4/3/2019    ESOPHAGOGASTRODUODENOSCOPY WITH BANDING performed by Kellee Ivory MD at [address] [place] N/A 6/5/2019    ESOPHAGOGASTRODUODENOSCOPY performed by Kellee Ivory MD at [address] [place] N/A 12/18/2019    EGD ESOPHAGOGASTRODUODENOSCOPY performed by Kellee Ivory MD at [address] 9/2/2020    EGD BIOPSY performed by Kellee Ivory MD at [address] 3/3/2021    ESOPHAGOGASTRODUODENOSCOPY performed by Kellee Ivory MD at [address] 9/1/2021    ESOPHAGOGASTRODUODENOSCOPY performed by Kellee Ivory MD at [address] [place] N/A 3/2/2022    ESOPHAGOGASTRODUODENOSCOPY performed by Kellee Ivory MD at [address] [facility] N/A 11/18/2019    OPEN REPAIR OF INCARCERATED VENTRAL HERNIA WITH MESH performed by Billie Betancourt MD at [address] [facility] [address] N/A 12/10/2021    LAPAROSCOPIC RECURRENT [address] [facility] [address] performed by Billie Betancourt MD at [address]       Current Medications:   No current facility-administered medications for this visit. Prior to Admission medications    Medication Sig Start Date End Date Taking?  Authorizing Provider   losartan (COZAAR) 100 MG tablet Take 100 mg by mouth daily   Yes Historical Provider, MD   spironolactone (ALDACTONE) 50 MG tablet Take 50 mg by mouth daily   Yes Historical Provider, MD   vitamin B-12 (CYANOCOBALAMIN) 1000 MCG tablet Take 1,000 mcg by mouth daily   Yes Historical Provider, MD   lansoprazole (PREVACID) 30 MG delayed release capsule Take 30 mg by mouth daily   Yes Historical Provider, MD   methocarbamol (ROBAXIN) 750 MG tablet Take 750 mg by mouth every 8 hours as needed 4/1/19  Yes Historical Provider, MD   aspirin 81 MG tablet Take 1 tablet by mouth daily 4/22/19  Yes Pasha Naranjo MD   atorvastatin (LIPITOR) 40 MG tablet Take 1 tablet by mouth nightly 1/30/19  Yes PATRICK Morrissey - ANAYA   glipiZIDE (GLUCOTROL) 10 MG tablet Take 10 mg by mouth 2 times daily (before meals)   Yes Historical Provider, MD   metFORMIN (GLUCOPHAGE) 1000 MG tablet Take 1 tablet by mouth 2 times daily (with meals) 12/7/18  Yes Collin Sheehan MD   nadolol (CORGARD) 80 MG tablet Take 1 tablet by mouth daily  Patient taking differently: Take 40 mg by mouth daily 40 mg daily 12/6/18  Yes Collin Sheehan MD   ferrous sulfate 325 (65 Fe) MG tablet Take 325 mg by mouth 2 times daily    Yes Historical Provider, MD   Multiple Vitamins-Minerals (MULTIVITAMIN PO) Take 1 tablet by mouth daily   Yes Historical Provider, MD   CINNAMON PO Take 1,000 mg by mouth 2 times daily   Yes Historical Provider, MD   glucose blood VI test strips (FREESTYLE LITE) strip Test blood sugar once daily. 10/6/14  Yes Herlinda Bhandari MD        Allergies:  Patient has no known allergies. Social History:    reports that he has never smoked. He has never used smokeless tobacco. He reports previous alcohol use. He reports that he does not use drugs. Family History:        Problem Relation Age of Onset    Kidney Disease Mother     Heart Disease Father     Diabetes Father     No Known Problems Brother     Diabetes Brother        REVIEW OF SYSTEMS:  Review of Systems   Constitutional: Negative. HENT: Negative. Eyes: Negative. Respiratory: Negative. Cardiovascular: Negative. Gastrointestinal: Positive for abdominal pain. Endocrine: Negative. Genitourinary: Negative. Musculoskeletal: Negative. Allergic/Immunologic: Negative. Psychiatric/Behavioral: Negative.         PHYSICAL EXAM:  VITALS:  Wt 247 lb 12.8 oz (112.4 kg)   BMI 31.82 kg/m²   CONSTITUTIONAL:  alert, no apparent distress and moderately overweight  EYES:  sclera clear  ENT:  normocepalic, without obvious abnormality  NECK:  supple, symmetrical, trachea midline  LUNGS:  clear to auscultation  CARDIOVASCULAR:  regular rate and rhythm  ABDOMEN:  scars noted are healed, normal bowel sounds, soft, non-distended, tenderness noted in the right lower quadrant, voluntary guarding absent, no masses palpated, no hepatosplenomegally and hernia present - right inguinal. Other prior repairs are intact  MUSCULOSKELETAL:  0+ edema lower extremities  NEUROLOGIC:  Mental Status Exam:  Level of Alertness:   awake  Orientation:   person, place, time  SKIN:  no bruising or bleeding, normal skin color, texture, turgor and no redness, warmth, or swelling    DATA:    No new studies  IMPRESSION/RECOMMENDATIONS:    Right inguinal hernia. Laparoscopic, possible open Right inguinal hernia repair will be scheduled. The plan for surgery has been reviewed in detail today. Risks and benefits have been reviewed, and all questions answered.        Andrae Rowe MD

## 2022-04-01 RX ORDER — LANSOPRAZOLE 30 MG/1
30 CAPSULE, DELAYED RELEASE ORAL DAILY
COMMUNITY

## 2022-04-01 NOTE — PROGRESS NOTES
Name_______________________________________Printed:____________________  Date and time of surgery_04/06/22_______0930________________Arrival Time:_0800___MAIN____________   1. The instructions given regarding when and if a patient needs to stop oral intake prior to surgery varies. Follow the specific instructions you were given                  _X__Nothing to eat or to drink after Midnight the night before.                   ____Carbo loading or ERAS instructions will be given to select patients-if you have been given those instructions -please do the following                           The evening before your surgery after dinner before midnight drink 40 ounces of gatorade. If you are diabetic use sugar free. The morning of surgery drink 40 ounces of water. This needs to be finished 3 hours prior to your surgery start time. 2. Take the following pills with a small sip of water on the morning of surgery______Nadolol_____________________________________________                  Do not take blood pressure medications ending in pril or sartan the gautam prior to surgery or the morning of surgery_   3. Aspirin, Ibuprofen, Advil, Naproxen, Vitamin E and other Anti-inflammatory products and supplements should be stopped for 5 -7days before surgery or as directed by your physician. 4. Check with your Doctor regarding stopping Plavix, Coumadin,Eliquis, Lovenox,Effient,Pradaxa,Xarelto, Fragmin or other blood thinners and follow their instructions. 5. Do not smoke, and do not drink any alcoholic beverages 24 hours prior to surgery. This includes NA Beer. Refrain from the usage of any recreational drugs. 6. You may brush your teeth and gargle the morning of surgery. DO NOT SWALLOW WATER   7. You MUST make arrangements for a responsible adult to stay on site while you are here and take you home after your surgery. You will not be allowed to leave alone or drive yourself home.   It is strongly suggested someone stay with you the first 24 hrs. Your surgery will be cancelled if you do not have a ride home. 8. A parent/legal guardian must accompany a child scheduled for surgery and plan to stay at the hospital until the child is discharged. Please do not bring other children with you. 9. Please wear simple, loose fitting clothing to the hospital.  Freddie Ruiz not bring valuables (money, credit cards, checkbooks, etc.) Do not wear any makeup (including no eye makeup) or nail polish on your fingers or toes. 10. DO NOT wear any jewelry or piercings on day of surgery. All body piercing jewelry must be removed. 11. If you have ___dentures, they will be removed before going to the OR; we will provide you a container. If you wear ___contact lenses or _X__glasses, they will be removed; please bring a case for them. 12. Please see your family doctor/pediatrician for a history & physical and/or concerning medications. Bring any test results/reports from your physician's office. PCP__________________Phone___________H&P Appt. Date________             13 If you  have a Living Will and Durable Power of  for Healthcare, please bring in a copy. 15. Notify your Surgeon if you develop any illness between now and surgery  time, cough, cold, fever, sore throat, nausea, vomiting, etc.  Please notify your surgeon if you experience dizziness, shortness of breath or blurred vision between now & the time of your surgery             15. DO NOT shave your operative site 96 hours prior to surgery. For face & neck surgery, men may use an electric razor 48 hours prior to surgery. 16. Shower the night before or morning of surgery using an antibacterial soap or as you have been instructed. 17. To provide excellent care visitors will be limited to one in the room at any given time. 18.  Please bring picture ID and insurance card.              19.  Visit our web site for additional information:  [email or web address][email or web address]              20.During flu season no children under the age of 15 are permitted in the hospital for the safety of all patients. 21. If you take a long acting insulin in the evening only  take half of your usual  dose the night  before your procedure              22. If you use a c-pap please bring DOS if staying overnight,             23.For your convenience [facility] has a pharmacy on site to fill your prescriptions. 24. If you use oxygen and have a portable tank please bring it  with you the DOS             25. Bring a complete list of all your medications with name and dose include any supplements. 26. Other__________________________________________   *Please call pre admission testing if you any further questions   [address]    [facility] [id number]. [place]  [phone]   [address] [place] [address]       VISITOR POLICY(subject to change)    Current policy is 2 visitors per patient. No children. A mask is required. Visiting hours are 8a-8p. Overnight visitors will be at the discretion of the nurse. All above information reviewed with patient in person or by phone. Patient verbalizes understanding. All questions and concerns addressed.                                                                                                  Patient/Rep_Patient-per phone___________________                                                                                                                                    PRE OP INSTRUCTIONS

## 2022-04-05 ENCOUNTER — ANESTHESIA EVENT (OUTPATIENT)
Dept: OPERATING ROOM | Age: 72
End: 2022-04-05
Payer: MEDICARE

## 2022-04-06 ENCOUNTER — HOSPITAL ENCOUNTER (OUTPATIENT)
Age: 72
Setting detail: OUTPATIENT SURGERY
Discharge: HOME | End: 2022-04-06
Attending: SURGERY | Admitting: SURGERY
Payer: MEDICARE

## 2022-04-06 ENCOUNTER — ANESTHESIA (OUTPATIENT)
Dept: OPERATING ROOM | Age: 72
End: 2022-04-06
Payer: MEDICARE

## 2022-04-06 VITALS
RESPIRATION RATE: 4 BRPM | OXYGEN SATURATION: 100 % | DIASTOLIC BLOOD PRESSURE: 56 MMHG | SYSTOLIC BLOOD PRESSURE: 118 MMHG

## 2022-04-06 VITALS
BODY MASS INDEX: 31.13 KG/M2 | RESPIRATION RATE: 15 BRPM | SYSTOLIC BLOOD PRESSURE: 176 MMHG | DIASTOLIC BLOOD PRESSURE: 81 MMHG | WEIGHT: 242.6 LBS | HEART RATE: 68 BPM | OXYGEN SATURATION: 100 % | TEMPERATURE: 96.5 F | HEIGHT: 74 IN

## 2022-04-06 DIAGNOSIS — K40.90 RIGHT INGUINAL HERNIA: Primary | ICD-10-CM

## 2022-04-06 LAB
ANION GAP SERPL CALCULATED.3IONS-SCNC: 13 MMOL/L (ref 3–16)
BUN BLDV-MCNC: 9 MG/DL (ref 7–20)
CALCIUM SERPL-MCNC: 9.5 MG/DL (ref 8.3–10.6)
CHLORIDE BLD-SCNC: 100 MMOL/L (ref 99–110)
CO2: 22 MMOL/L (ref 21–32)
CREAT SERPL-MCNC: 0.8 MG/DL (ref 0.8–1.3)
EKG ATRIAL RATE: 69 BPM
EKG DIAGNOSIS: NORMAL
EKG P AXIS: 39 DEGREES
EKG P-R INTERVAL: 152 MS
EKG Q-T INTERVAL: 414 MS
EKG QRS DURATION: 84 MS
EKG QTC CALCULATION (BAZETT): 443 MS
EKG R AXIS: 38 DEGREES
EKG T AXIS: 32 DEGREES
EKG VENTRICULAR RATE: 69 BPM
GFR AFRICAN AMERICAN: >60
GFR NON-AFRICAN AMERICAN: >60
GLUCOSE BLD-MCNC: 119 MG/DL (ref 70–99)
GLUCOSE BLD-MCNC: 121 MG/DL (ref 70–99)
GLUCOSE BLD-MCNC: 122 MG/DL (ref 70–99)
PERFORMED ON: ABNORMAL
PERFORMED ON: ABNORMAL
POTASSIUM SERPL-SCNC: 4.5 MMOL/L (ref 3.5–5.1)
SODIUM BLD-SCNC: 135 MMOL/L (ref 136–145)

## 2022-04-06 PROCEDURE — 7100000010 HC PHASE II RECOVERY - FIRST 15 MIN: Performed by: SURGERY

## 2022-04-06 PROCEDURE — 93005 ELECTROCARDIOGRAM TRACING: CPT | Performed by: ANESTHESIOLOGY

## 2022-04-06 PROCEDURE — 2500000003 HC RX 250 WO HCPCS: Performed by: NURSE ANESTHETIST, CERTIFIED REGISTERED

## 2022-04-06 PROCEDURE — 2580000003 HC RX 258: Performed by: ANESTHESIOLOGY

## 2022-04-06 PROCEDURE — A4217 STERILE WATER/SALINE, 500 ML: HCPCS | Performed by: SURGERY

## 2022-04-06 PROCEDURE — 6370000000 HC RX 637 (ALT 250 FOR IP): Performed by: SURGERY

## 2022-04-06 PROCEDURE — 93010 ELECTROCARDIOGRAM REPORT: CPT | Performed by: INTERNAL MEDICINE

## 2022-04-06 PROCEDURE — 2709999900 HC NON-CHARGEABLE SUPPLY: Performed by: SURGERY

## 2022-04-06 PROCEDURE — 2720000010 HC SURG SUPPLY STERILE: Performed by: SURGERY

## 2022-04-06 PROCEDURE — 7100000000 HC PACU RECOVERY - FIRST 15 MIN: Performed by: SURGERY

## 2022-04-06 PROCEDURE — 6360000002 HC RX W HCPCS: Performed by: NURSE ANESTHETIST, CERTIFIED REGISTERED

## 2022-04-06 PROCEDURE — 49650 LAP ING HERNIA REPAIR INIT: CPT | Performed by: SURGERY

## 2022-04-06 PROCEDURE — 3600000014 HC SURGERY LEVEL 4 ADDTL 15MIN: Performed by: SURGERY

## 2022-04-06 PROCEDURE — 2500000003 HC RX 250 WO HCPCS: Performed by: SURGERY

## 2022-04-06 PROCEDURE — 2580000003 HC RX 258: Performed by: SURGERY

## 2022-04-06 PROCEDURE — 3600000004 HC SURGERY LEVEL 4 BASE: Performed by: SURGERY

## 2022-04-06 PROCEDURE — 3700000000 HC ANESTHESIA ATTENDED CARE: Performed by: SURGERY

## 2022-04-06 PROCEDURE — 80048 BASIC METABOLIC PNL TOTAL CA: CPT

## 2022-04-06 PROCEDURE — 36415 COLL VENOUS BLD VENIPUNCTURE: CPT

## 2022-04-06 PROCEDURE — C1713 ANCHOR/SCREW BN/BN,TIS/BN: HCPCS | Performed by: SURGERY

## 2022-04-06 PROCEDURE — 7100000001 HC PACU RECOVERY - ADDTL 15 MIN: Performed by: SURGERY

## 2022-04-06 PROCEDURE — C1781 MESH (IMPLANTABLE): HCPCS | Performed by: SURGERY

## 2022-04-06 PROCEDURE — 3700000001 HC ADD 15 MINUTES (ANESTHESIA): Performed by: SURGERY

## 2022-04-06 PROCEDURE — 6360000002 HC RX W HCPCS: Performed by: SURGERY

## 2022-04-06 PROCEDURE — 7100000011 HC PHASE II RECOVERY - ADDTL 15 MIN: Performed by: SURGERY

## 2022-04-06 DEVICE — SYSTEM PERM FIX L37CM 15 FAST CAPSUR: Type: IMPLANTABLE DEVICE | Site: INGUINAL | Status: FUNCTIONAL

## 2022-04-06 DEVICE — MESH HERN L W10.8XL16CM R INGUINAL WHT POLYPR MFIL: Type: IMPLANTABLE DEVICE | Site: INGUINAL | Status: FUNCTIONAL

## 2022-04-06 RX ORDER — HYDROMORPHONE HCL 110MG/55ML
0.5 PATIENT CONTROLLED ANALGESIA SYRINGE INTRAVENOUS EVERY 5 MIN PRN
Status: DISCONTINUED | OUTPATIENT
Start: 2022-04-06 | End: 2022-04-06 | Stop reason: HOSPADM

## 2022-04-06 RX ORDER — SODIUM CHLORIDE 0.9 % (FLUSH) 0.9 %
5-40 SYRINGE (ML) INJECTION PRN
Status: DISCONTINUED | OUTPATIENT
Start: 2022-04-06 | End: 2022-04-06 | Stop reason: HOSPADM

## 2022-04-06 RX ORDER — MINERAL OIL 471.99 G/472ML
OIL TOPICAL
Status: COMPLETED | OUTPATIENT
Start: 2022-04-06 | End: 2022-04-06

## 2022-04-06 RX ORDER — MEPERIDINE HYDROCHLORIDE 25 MG/ML
12.5 INJECTION INTRAMUSCULAR; INTRAVENOUS; SUBCUTANEOUS EVERY 5 MIN PRN
Status: DISCONTINUED | OUTPATIENT
Start: 2022-04-06 | End: 2022-04-06 | Stop reason: HOSPADM

## 2022-04-06 RX ORDER — LIDOCAINE HYDROCHLORIDE 20 MG/ML
INJECTION, SOLUTION EPIDURAL; INFILTRATION; INTRACAUDAL; PERINEURAL PRN
Status: DISCONTINUED | OUTPATIENT
Start: 2022-04-06 | End: 2022-04-06 | Stop reason: SDUPTHER

## 2022-04-06 RX ORDER — SODIUM CHLORIDE, SODIUM LACTATE, POTASSIUM CHLORIDE, AND CALCIUM CHLORIDE .6; .31; .03; .02 G/100ML; G/100ML; G/100ML; G/100ML
IRRIGANT IRRIGATION
Status: COMPLETED | OUTPATIENT
Start: 2022-04-06 | End: 2022-04-06

## 2022-04-06 RX ORDER — PROPOFOL 10 MG/ML
INJECTION, EMULSION INTRAVENOUS PRN
Status: DISCONTINUED | OUTPATIENT
Start: 2022-04-06 | End: 2022-04-06 | Stop reason: SDUPTHER

## 2022-04-06 RX ORDER — HYDROMORPHONE HCL 110MG/55ML
0.25 PATIENT CONTROLLED ANALGESIA SYRINGE INTRAVENOUS EVERY 5 MIN PRN
Status: DISCONTINUED | OUTPATIENT
Start: 2022-04-06 | End: 2022-04-06 | Stop reason: HOSPADM

## 2022-04-06 RX ORDER — HYDROCODONE BITARTRATE AND ACETAMINOPHEN 5; 325 MG/1; MG/1
1 TABLET ORAL EVERY 4 HOURS PRN
Qty: 20 TABLET | Refills: 0 | Status: SHIPPED | OUTPATIENT
Start: 2022-04-06 | End: 2022-04-13

## 2022-04-06 RX ORDER — SODIUM CHLORIDE 9 MG/ML
INJECTION, SOLUTION INTRAVENOUS CONTINUOUS
Status: DISCONTINUED | OUTPATIENT
Start: 2022-04-06 | End: 2022-04-06 | Stop reason: HOSPADM

## 2022-04-06 RX ORDER — SODIUM CHLORIDE 9 MG/ML
INJECTION, SOLUTION INTRAVENOUS PRN
Status: DISCONTINUED | OUTPATIENT
Start: 2022-04-06 | End: 2022-04-06 | Stop reason: HOSPADM

## 2022-04-06 RX ORDER — SODIUM CHLORIDE 0.9 % (FLUSH) 0.9 %
5-40 SYRINGE (ML) INJECTION EVERY 12 HOURS SCHEDULED
Status: DISCONTINUED | OUTPATIENT
Start: 2022-04-06 | End: 2022-04-06 | Stop reason: HOSPADM

## 2022-04-06 RX ORDER — MAGNESIUM HYDROXIDE 1200 MG/15ML
LIQUID ORAL CONTINUOUS PRN
Status: COMPLETED | OUTPATIENT
Start: 2022-04-06 | End: 2022-04-06

## 2022-04-06 RX ORDER — FENTANYL CITRATE 50 UG/ML
INJECTION, SOLUTION INTRAMUSCULAR; INTRAVENOUS PRN
Status: DISCONTINUED | OUTPATIENT
Start: 2022-04-06 | End: 2022-04-06 | Stop reason: SDUPTHER

## 2022-04-06 RX ORDER — BUPIVACAINE HYDROCHLORIDE 5 MG/ML
INJECTION, SOLUTION EPIDURAL; INTRACAUDAL
Status: COMPLETED | OUTPATIENT
Start: 2022-04-06 | End: 2022-04-06

## 2022-04-06 RX ORDER — DIPHENHYDRAMINE HYDROCHLORIDE 50 MG/ML
12.5 INJECTION INTRAMUSCULAR; INTRAVENOUS
Status: DISCONTINUED | OUTPATIENT
Start: 2022-04-06 | End: 2022-04-06 | Stop reason: HOSPADM

## 2022-04-06 RX ORDER — ROCURONIUM BROMIDE 10 MG/ML
INJECTION, SOLUTION INTRAVENOUS PRN
Status: DISCONTINUED | OUTPATIENT
Start: 2022-04-06 | End: 2022-04-06 | Stop reason: SDUPTHER

## 2022-04-06 RX ORDER — DEXAMETHASONE SODIUM PHOSPHATE 4 MG/ML
INJECTION, SOLUTION INTRA-ARTICULAR; INTRALESIONAL; INTRAMUSCULAR; INTRAVENOUS; SOFT TISSUE PRN
Status: DISCONTINUED | OUTPATIENT
Start: 2022-04-06 | End: 2022-04-06 | Stop reason: SDUPTHER

## 2022-04-06 RX ORDER — ONDANSETRON 2 MG/ML
INJECTION INTRAMUSCULAR; INTRAVENOUS PRN
Status: DISCONTINUED | OUTPATIENT
Start: 2022-04-06 | End: 2022-04-06 | Stop reason: SDUPTHER

## 2022-04-06 RX ORDER — ONDANSETRON 2 MG/ML
4 INJECTION INTRAMUSCULAR; INTRAVENOUS
Status: DISCONTINUED | OUTPATIENT
Start: 2022-04-06 | End: 2022-04-06 | Stop reason: HOSPADM

## 2022-04-06 RX ADMIN — PROPOFOL 160 MG: 10 INJECTION, EMULSION INTRAVENOUS at 09:59

## 2022-04-06 RX ADMIN — SODIUM CHLORIDE: 9 INJECTION, SOLUTION INTRAVENOUS at 09:51

## 2022-04-06 RX ADMIN — DEXAMETHASONE SODIUM PHOSPHATE 10 MG: 4 INJECTION, SOLUTION INTRAMUSCULAR; INTRAVENOUS at 10:07

## 2022-04-06 RX ADMIN — ONDANSETRON 4 MG: 2 INJECTION INTRAMUSCULAR; INTRAVENOUS at 10:57

## 2022-04-06 RX ADMIN — SUGAMMADEX 200 MG: 100 INJECTION, SOLUTION INTRAVENOUS at 11:08

## 2022-04-06 RX ADMIN — LIDOCAINE HYDROCHLORIDE 60 MG: 20 INJECTION, SOLUTION EPIDURAL; INFILTRATION; INTRACAUDAL; PERINEURAL at 09:59

## 2022-04-06 RX ADMIN — ROCURONIUM BROMIDE 10 MG: 10 INJECTION, SOLUTION INTRAVENOUS at 10:38

## 2022-04-06 RX ADMIN — ROCURONIUM BROMIDE 40 MG: 10 INJECTION, SOLUTION INTRAVENOUS at 09:59

## 2022-04-06 RX ADMIN — FENTANYL CITRATE 100 MCG: 50 INJECTION, SOLUTION INTRAMUSCULAR; INTRAVENOUS at 09:59

## 2022-04-06 RX ADMIN — CEFAZOLIN SODIUM 2000 MG: 10 INJECTION, POWDER, FOR SOLUTION INTRAVENOUS at 09:44

## 2022-04-06 RX ADMIN — FENTANYL CITRATE 100 MCG: 50 INJECTION, SOLUTION INTRAMUSCULAR; INTRAVENOUS at 11:15

## 2022-04-06 ASSESSMENT — PULMONARY FUNCTION TESTS
PIF_VALUE: 20
PIF_VALUE: 31
PIF_VALUE: 22
PIF_VALUE: 22
PIF_VALUE: 31
PIF_VALUE: 16
PIF_VALUE: 22
PIF_VALUE: 31
PIF_VALUE: 23
PIF_VALUE: 4
PIF_VALUE: 31
PIF_VALUE: 32
PIF_VALUE: 30
PIF_VALUE: 30
PIF_VALUE: 31
PIF_VALUE: 23
PIF_VALUE: 24
PIF_VALUE: 31
PIF_VALUE: 18
PIF_VALUE: 18
PIF_VALUE: 32
PIF_VALUE: 2
PIF_VALUE: 30
PIF_VALUE: 19
PIF_VALUE: 31
PIF_VALUE: 31
PIF_VALUE: 20
PIF_VALUE: 30
PIF_VALUE: 2
PIF_VALUE: 31
PIF_VALUE: 31
PIF_VALUE: 32
PIF_VALUE: 20
PIF_VALUE: 5
PIF_VALUE: 30
PIF_VALUE: 21
PIF_VALUE: 31
PIF_VALUE: 24
PIF_VALUE: 31
PIF_VALUE: 22
PIF_VALUE: 2
PIF_VALUE: 2
PIF_VALUE: 22
PIF_VALUE: 29
PIF_VALUE: 32
PIF_VALUE: 22
PIF_VALUE: 22
PIF_VALUE: 4
PIF_VALUE: 14
PIF_VALUE: 19
PIF_VALUE: 0
PIF_VALUE: 19
PIF_VALUE: 23
PIF_VALUE: 31
PIF_VALUE: 19
PIF_VALUE: 14
PIF_VALUE: 26
PIF_VALUE: 2
PIF_VALUE: 32
PIF_VALUE: 8
PIF_VALUE: 23
PIF_VALUE: 2
PIF_VALUE: 22
PIF_VALUE: 18
PIF_VALUE: 31
PIF_VALUE: 31
PIF_VALUE: 30
PIF_VALUE: 18
PIF_VALUE: 31
PIF_VALUE: 31
PIF_VALUE: 30
PIF_VALUE: 31
PIF_VALUE: 18
PIF_VALUE: 0
PIF_VALUE: 1
PIF_VALUE: 19
PIF_VALUE: 18
PIF_VALUE: 30
PIF_VALUE: 18
PIF_VALUE: 28
PIF_VALUE: 19
PIF_VALUE: 23
PIF_VALUE: 18

## 2022-04-06 ASSESSMENT — PAIN DESCRIPTION - LOCATION: LOCATION: ABDOMEN

## 2022-04-06 ASSESSMENT — PAIN - FUNCTIONAL ASSESSMENT: PAIN_FUNCTIONAL_ASSESSMENT: 0-10

## 2022-04-06 ASSESSMENT — PAIN SCALES - GENERAL
PAINLEVEL_OUTOF10: 3
PAINLEVEL_OUTOF10: 1

## 2022-04-06 ASSESSMENT — PAIN DESCRIPTION - PAIN TYPE: TYPE: SURGICAL PAIN

## 2022-04-06 NOTE — OP NOTE
[facility]                     [facility], [address] [facility]                                OPERATIVE REPORT    PATIENT NAME: Cy Jiang                   :        1950  MED REC NO:   [id number]                          ROOM:  ACCOUNT NO:   [de-identified]                           ADMIT DATE: 2022  PROVIDER:     Timothy Burgess MD    DATE OF PROCEDURE:  2022    PREOPERATIVE DIAGNOSIS:  Right inguinal hernia, incarcerated. POSTOPERATIVE DIAGNOSIS:  Right inguinal hernia, incarcerated. PROCEDURE:  Laparoscopic repair of incarcerated right inguinal hernia  with Bard 3DMax large mesh. SURGEON:  Timothy Burgess MD    ANESTHESIA:  General plus local.    ESTIMATED BLOOD LOSS:  Minimal.    COMPLICATIONS:  None. INDICATION:  The patient is a 64-year-old gentleman with a tender  enlarging right inguinal hernia for repair today. Intended procedure  was reviewed with the patient. Antibiotics were ordered and  administered. All questions were answered. The site was marked and we  proceeded to the OR. ADDITIONAL DETAILS OF SURGERY:  The patient was brought to the operating  room and placed on the operating table in the supine position. Compression boots were placed. General anesthetic was administered. The abdomen was prepped and draped sterilely and time-out was done. A  vertical infraumbilical incision was made just to the right of midline  below the umbilicus and dissection was carried down to the level of the  rectus fascia. To the right of midline, the rectus sheath was opened,  muscles were split to the side and the posterior rectus sheath was  visualized. In this plane, a balloon dissecting catheter was passed  down to the level of pubis, inflated with 30 pumps of the bulb and then  removed. There was a 12 mm port placed at this site and the  preperitoneal space was insufflated to 15 mmHg pressure.   Two 5-mm ports  were then placed in the lower midline under direct vision. Inguinal  anatomy was assessed. The direct space, indirect space and lateral  space were all cleared. There was a moderate-sized lipoma of the cord,  which was reduced. There was a large incarcerated hernia sac which was  gradually and progressively dissected out of the internal ring and the  inguinal canal.  With the balloon dissector, there was one opening of  the hernia sac created which was closed with 0-PDS Endoloops x2. The  repair was then performed. The 3DMax right-sided large mesh was  positioned covering the direct space, indirect space and femoral space  nicely. It was tacked medially to the pubis, inferiorly to Rigo's  ligament and superiorly to the posterior aspect of the rectus muscle  above Hesselbach's triangle. No tacks were placed out laterally or  inferior laterally as to avoid the nerves and vessels in these  locations. Of note, the patient had a previous laparoscopic repair done  on the left side and this was undisturbed with the surgery today. The  mesh was held down in position for the repair. The hernia _____ in the  preperitoneal space. All appeared hemostatic in the dissected fields. The preperitoneal space was deflated. With the opening in the  peritoneum, some air got into the peritoneal cavity and peritoneum was  opened below the fascial incision in the rectus fascia by the umbilicus. This was evacuated. Additionally, the patient does have some ascites  due to chronic medical disease. The peritoneum and posterior fascia  were closed with 0 Vicryl suture and the anterior fascia at the  umbilicus at this site was also then closed with 0 Vicryl suture. Skin  was closed at all sites with 4-0 Monocryl suture. Dermabond glue was  placed. The patient was taken to recovery room in stable condition  postop.         Mahin Flores MD    D: 04/06/2022 11:21:58       T: 04/06/2022 11:25:52 [id number]  Job#: [id number]     Doc#: [id number]    CC:  Epifanio Rice MD

## 2022-04-06 NOTE — PROGRESS NOTES
Phase 1 complete, pt seen by anesthesiologist. VSS, pt resting comfortably. Incision sites x3 are CDI, ice applied. Will transition to phase 2 for d/c.

## 2022-04-06 NOTE — H&P
[facility] Laparoscopic [facility]      I have reviewed the history and physical and examined the patient and find no relevant changes. I have reviewed with the patient and/or family the risks, benefits, and alternatives to the procedure.     Lillian Headley MD  4/6/2022

## 2022-04-06 NOTE — PROGRESS NOTES
Discharge instructions reviewed with pt and wife. Pt and wife v/u. Abdominal laparoscopic sites well approximated, no drainage noted.   Pt denies pain at this time

## 2022-04-06 NOTE — DISCHARGE INSTR - DIET

## 2022-04-06 NOTE — BRIEF OP NOTE
Brief Postoperative Note      Patient: Linda Vera  YOB: 1950  MRN: [id number]    Date of Procedure: 4/6/2022    Pre-Op Diagnosis: K40.90 Right Inguinal Hernia    Post-Op Diagnosis: Same       Procedure(s):  LAPAROSCOPIC RIGHT INGUINAL HERNIA REPAIR WITH MESH  POSSIB LE OPEN    Surgeon(s):  Екатерина Alexis MD    Assistant:  Surgical Assistant: Link Matos    Anesthesia: General    Estimated Blood Loss (mL): Minimal    Complications: None    Specimens:   * No specimens in log *    Implants:  Implant Name Type Inv.  Item Serial No.  Lot No. LRB No. Used Action   SYSTEM PERM FIX L37CM 15 FAST CAPSUR - [id number]  SYSTEM PERM FIX L37CM 15 FAST CAPSUR  BARD DAVOL-WD [id number] Right 1 Implanted   MESH LAURA L W10.2BC20LE R INGUINAL WHT POLYPR MFIL - [id number]  MESH LAURA L [id number] R INGUINAL WHT POLYPR MFIL  Mihir Patricia [id number] Right 1 Implanted         Drains: * No LDAs found *    Findings: Large incarcerated indirect RIH repaired    Electronically signed by Екатерина Alexis MD on 4/6/2022 at 11:05 AM

## 2022-04-06 NOTE — PROGRESS NOTES
Pt arrive to bay 3 for phase II recovery. Pt is awake and alert. VSS. Pt tolerating PO. Pain is rated 1 on 0-10 scale, pt denies need for pain medication.

## 2022-04-06 NOTE — ANESTHESIA PRE PROCEDURE
Pre-Anesthesia Evaluation/Consultation       Name:  Boyd Eldridge  : 1950  Age:  66 y.o.                                            MRN:  [id number]  Date: 2022           Surgeon: Surgeon(s):  Waqar Mendoza MD    Procedure: Procedure(s):  LAPAROSCOPIC RIGHT INGUINAL HERNIA REPAIR WITH MESH  POSSIB LE OPEN     No Known Allergies  Patient Active Problem List   Diagnosis    Hypertension    Cyst of joint of hand    High triglycerides    Dupuytren's contracture of right hand    Rosacea    Thrombocytopenia (HCC)    Chronic ITP (idiopathic thrombocytopenia) (HCC)    Iron deficiency anemia    Anemia, iron deficiency    Chest pain    Non-STEMI (non-ST elevated myocardial infarction) ([address]    CAD (coronary artery disease)    Hyperlipidemia    Incarcerated ventral hernia    Recurrent incisional hernia with incarceration    Type 2 diabetes mellitus     Past Medical History:   Diagnosis Date    Arthritis     CAD (coronary artery disease)     History of blood transfusion     Hyperlipidemia     Hypertension     MARIE (nonalcoholic steatohepatitis)     Osteoarthritis     Spinal stenosis     Thrombocytopenia (HCC)     Type 2 diabetes mellitus without complication ([address]     Type II or unspecified type diabetes mellitus without mention of complication, not stated as uncontrolled     type 2    Wears glasses      Past Surgical History:   Procedure Laterality Date    BACK SURGERY  2016    L3, L4,L5 remove spurs    BACK SURGERY      L2, L3 released pressure from nerve    COLONOSCOPY  2018    wiht polypectomy x3    COLONOSCOPY N/A 2018    COLONOSCOPY POLYPECTOMY SNARE/COLD BIOPSY performed by Karen Davis MD at [facility]  12/04/2018    x2 stents-70% and 85% blocked    CYST REMOVAL Right     on arm    ENDOSCOPY, COLON, DIAGNOSTIC      HAND SURGERY Right 2013    dupuytren syndrome    INGUINAL HERNIA REPAIR Left 12/11/15    laparoscopic    UPPER GASTROINTESTINAL ENDOSCOPY  12/04/2018    with biopsy    UPPER GASTROINTESTINAL ENDOSCOPY N/A 12/4/2018    EGD BIOPSY performed by Clara Gómez MD at [facility] N/A 3/6/2019    EGD BAND LIGATION performed by Low Shin MD at [address] [facility] N/A 4/3/2019    ESOPHAGOGASTRODUODENOSCOPY WITH BANDING performed by Low hSin MD at [address] [facility] N/A 6/5/2019    ESOPHAGOGASTRODUODENOSCOPY performed by Low Shin MD at [address] [facility] N/A 12/18/2019    EGD ESOPHAGOGASTRODUODENOSCOPY performed by Low Shin MD at [address] [facility] N/A 9/2/2020    EGD BIOPSY performed by Low Shin MD at [address] [facility] 3/3/2021    ESOPHAGOGASTRODUODENOSCOPY performed by Low Shin MD at [address] [facility] N/A 9/1/2021    ESOPHAGOGASTRODUODENOSCOPY performed by Low Shin MD at [address] [facility] N/A 3/2/2022    ESOPHAGOGASTRODUODENOSCOPY performed by Low Shin MD at [facility] N/A 11/18/2019    OPEN REPAIR OF INCARCERATED VENTRAL HERNIA WITH MESH performed by Ca Mejia MD at [address] [place] [address] N/A 12/10/2021    LAPAROSCOPIC RECURRENT [address] performed by Ca Mejia MD at [address] [facility] History     Tobacco Use    Smoking status: Never Smoker    Smokeless tobacco: Never Used   Vaping Use    Vaping Use: Never used   Substance Use Topics    Alcohol use: Not Currently     Alcohol/week: 0.0 standard drinks     Comment: quit drinking 1990's    Drug use: Never     Medications  Current Outpatient Medications on File Prior to Visit   Medication Sig Dispense Refill    lansoprazole (PREVACID) 30 MG delayed release capsule Take 30 mg by mouth daily      losartan (COZAAR) 100 MG tablet Take 100 mg by mouth daily      spironolactone (ALDACTONE) 50 MG tablet Take 50 mg by mouth daily      vitamin B-12 (CYANOCOBALAMIN) 1000 MCG tablet Take 1,000 mcg by mouth daily      methocarbamol (ROBAXIN) 750 MG tablet Take 750 mg by mouth every 8 hours as needed      aspirin 81 MG tablet Take 1 tablet by mouth daily 90 tablet 3    atorvastatin (LIPITOR) 40 MG tablet Take 1 tablet by mouth nightly 90 tablet 3    glipiZIDE (GLUCOTROL) 10 MG tablet Take 10 mg by mouth daily       metFORMIN (GLUCOPHAGE) 1000 MG tablet Take 1 tablet by mouth 2 times daily (with meals) 180 tablet 1    nadolol (CORGARD) 80 MG tablet Take 1 tablet by mouth daily (Patient taking differently: Take 40 mg by mouth daily 40 mg daily) 30 tablet 1    ferrous sulfate 325 (65 Fe) MG tablet Take 325 mg by mouth 2 times daily       Multiple Vitamins-Minerals (MULTIVITAMIN PO) Take 1 tablet by mouth daily      CINNAMON PO Take 1,000 mg by mouth 2 times daily      glucose blood VI test strips (FREESTYLE LITE) strip Test blood sugar once daily. 100 each 3     No current facility-administered medications on file prior to visit. No current outpatient medications on file. No current facility-administered medications for this visit. Vital Signs (Current)   There were no vitals filed for this visit. Vital Signs Statistics (for past 48 hrs)     No data recorded  BP Readings from Last 3 Encounters:   03/10/22 124/60   03/02/22 (!) 144/68   03/02/22 (!) 150/66       BMI  There is no height or weight on file to calculate BMI. Estimated body mass index is 31.46 kg/m² as calculated from the following:    Height as of 4/1/22: 6' 2\" (1.88 m). Weight as of 4/1/22: 245 lb (111.1 kg).     CBC   Lab Results   Component Value Date    WBC 5.3 12/10/2021    RBC 4.36 12/10/2021 RBC 4.03 11/23/2016    HGB 13.6 12/10/2021    HCT 39.6 12/10/2021    MCV 90.8 12/10/2021    RDW 14.6 12/10/2021    PLT 84 12/10/2021     CMP    Lab Results   Component Value Date     12/10/2021    K 3.9 12/10/2021    K 3.8 12/06/2018    CL 96 12/10/2021    CO2 26 12/10/2021    BUN 13 12/10/2021    CREATININE 1.1 12/10/2021    GFRAA >60 12/10/2021    GFRAA >60 03/08/2013    AGRATIO 1.4 10/26/2019    LABGLOM >60 12/10/2021    GLUCOSE 157 12/10/2021    PROT 6.8 10/26/2019    PROT 7.5 03/08/2013    CALCIUM 9.7 12/10/2021    BILITOT 1.7 10/26/2019    ALKPHOS 86 10/26/2019    AST 33 11/14/2020    ALT 33 11/14/2020     BMP    Lab Results   Component Value Date     12/10/2021    K 3.9 12/10/2021    K 3.8 12/06/2018    CL 96 12/10/2021    CO2 26 12/10/2021    BUN 13 12/10/2021    CREATININE 1.1 12/10/2021    CALCIUM 9.7 12/10/2021    GFRAA >60 12/10/2021    GFRAA >60 03/08/2013    LABGLOM >60 12/10/2021    GLUCOSE 157 12/10/2021     POCGlucose  No results for input(s): GLUCOSE in the last 72 hours.    Coags    Lab Results   Component Value Date    PROTIME 13.0 11/30/2018    INR 1.14 66/89/3629     HCG (If Applicable) No results found for: Tonna Slice, HCG, HCGQUANT   ABGs   Lab Results   Component Value Date    PHART 7.219 12/11/2015    PO2ART 414 12/11/2015    STI0JVH 68 12/11/2015    ABR2MNP 27.8 12/11/2015    BEART 0 12/11/2015    S4FJBZLI 100 12/11/2015      Type & Screen (If Applicable)  No results found for: LABABO, LABRH                         BMI: Wt Readings from Last 3 Encounters:       NPO Status:                          Anesthesia Evaluation  Patient summary reviewed and Nursing notes reviewed  Airway: Mallampati: II  TM distance: >3 FB   Neck ROM: full  Mouth opening: > = 3 FB Dental:    (+) upper dentures and lower dentures      Pulmonary:                              Cardiovascular:  Exercise tolerance: good (>4 METS),   (+) hypertension: moderate, past MI:, CAD: obstructive, Neuro/Psych:                ROS comment: Spinal stenosis GI/Hepatic/Renal:   (+) liver disease:,           Endo/Other:    (+) DiabetesType II DM, well controlled, , : arthritis:., .                 Abdominal:             Vascular: Other Findings:               Anesthesia Plan      general     ASA 3     (    Anish Veloz MD )  Induction: intravenous and rapid sequence. MIPS: Postoperative opioids intended, Prophylactic antiemetics administered and Postoperative trial extubation. Anesthetic plan and risks discussed with patient and child/children. Plan discussed with CRNA.                 This pre-anesthesia assessment may be used as a history and physical.         Evangelina Judge MD  April 6, 2022  8:01 AM

## 2022-04-06 NOTE — ANESTHESIA POSTPROCEDURE EVALUATION
Department of Anesthesiology  Postprocedure Note    Patient: Curt Ratliff  MRN: [id number]  YOB: 1950  Date of evaluation: 4/6/2022  Time:  11:28 AM     Procedure Summary     Date: 04/06/22 Room / Location: [address]    Anesthesia Start: 8458 Anesthesia Stop: 1127    Procedures:       LAPAROSCOPIC RIGHT INGUINAL HERNIA REPAIR WITH MESH (Right )      POSSIB LE OPEN (Right Abdomen) Diagnosis: (K40.90)    Surgeons: Angela Dennison MD Responsible Provider: Carlie Lemus MD    Anesthesia Type: general ASA Status: 3          Anesthesia Type: general    Queenie Phase I: Queenie Score: 10    Queenie Phase II:      Last vitals: Reviewed and per EMR flowsheets.        Anesthesia Post Evaluation    Patient location during evaluation: PACU  Patient participation: complete - patient participated  Level of consciousness: awake and alert  Pain score: 2  Airway patency: patent  Nausea & Vomiting: no vomiting  Complications: no  Cardiovascular status: blood pressure returned to baseline  Respiratory status: acceptable  Hydration status: euvolemic  Multimodal analgesia pain management approach

## 2022-04-21 ENCOUNTER — OFFICE VISIT (OUTPATIENT)
Dept: SURGERY | Age: 72
End: 2022-04-21

## 2022-04-21 VITALS — DIASTOLIC BLOOD PRESSURE: 58 MMHG | WEIGHT: 242 LBS | BODY MASS INDEX: 31.07 KG/M2 | SYSTOLIC BLOOD PRESSURE: 122 MMHG

## 2022-04-21 DIAGNOSIS — K40.90 RIGHT INGUINAL HERNIA: Primary | ICD-10-CM

## 2022-04-21 PROCEDURE — 99024 POSTOP FOLLOW-UP VISIT: CPT | Performed by: SURGERY

## 2022-04-21 NOTE — LETTER
[address]  [address] [address]  Phone: [phone]  Fax: [phone]    Rohan Smallwood MD    April 21, 2022     [place] [address]    Patient: Eli Bonilla   MR Number: [id number]   YOB: 1950   Date of Visit: 4/21/2022       Dear Kay Silvestreal: Thank you for referring Reuben Dietz to me for evaluation/treatment. Below are the relevant portions of my assessment and plan of care. If you have questions, please do not hesitate to call me. I look forward to following [place] Erika along with you.     Sincerely,      Rohan Smallwood MD

## 2022-04-21 NOTE — PROGRESS NOTES
[facility]            PATIENT NAME: Andreia Fine     TODAY'S DATE: 4/21/2022    SUBJECTIVE:      Pt. returns to the office today following a Right laparoscopic inguinal hernia repair. He had surgery on 4/6 at [facility]. He has been recovering well to date, with progression towards normal activity and diet. He has no complaints today. OBJECTIVE:   VITALS:  BP (!) 122/58   Wt 242 lb (109.8 kg)   BMI 31.07 kg/m²                                  CONSTITUTIONAL:  awake and alert  ABDOMEN:   Hernia repair is intact, normal bowel sounds, soft, non-distended, non-tender   INCISIONS: clean, dry, no drainage        ASSESSMENT AND PLAN:  66 y.o. male status post Right laparoscopic inguinal hernia repair. His recovery is progressing uneventfully, had some groin pain while crab walking with granddaughter. Asked him to wait a month to do, then will be fine. He will call or return for any additional problems or questions.       Dior Hall MD

## 2022-06-02 NOTE — PROGRESS NOTES
[facility]   Cardiac Follow up     Referring Provider:  Afia Ibrahim     Chief Complaint   Patient presents with    6 Month Follow-Up    Hypertension      History of Present Illness:  Jevon Winslow is a 74 y.o.male with a history of NSTEMI / CAD s/p PTCA RCA (12/2018), hypertension, and hyperlipidemia. His other history includes: JOSE LUIS, esophageal varices, mild portal hypertensive gastropathy / portal HTN with cirrhosis and ITP. Nuclear stress test in June was without ischemia. He reports at the time of his 2018 stenting he was having dyspnea. He has reported that his wife has lung cancer (she is nonsmoker). Jamir Rees did undergo a hernia repair (4/6/21) with Dr. Cristopher Hooper without significant complication. He also continues to follow with GI and had EGD in September. Today, Jamir Rees is here for 6 mos follow up. Pt reports his wife's cancer is \"stable. \" In October, he plans to go to [facility] for a week. Pt denies exertional chest pain, SOUZA/PND, palpitations, light-headedness, edema. Past Medical History:   has a past medical history of Arthritis, CAD (coronary artery disease), History of blood transfusion, Hyperlipidemia, Hypertension, MARIE (nonalcoholic steatohepatitis), Osteoarthritis, Spinal stenosis, Thrombocytopenia ([address] Type 2 diabetes mellitus without complication ([address] Type II or unspecified type diabetes mellitus without mention of complication, not stated as uncontrolled, and Wears glasses. Surgical History:   has a past surgical history that includes cyst removal (Right); Colonoscopy (12/04/2018); Upper gastrointestinal endoscopy (12/04/2018); Upper gastrointestinal endoscopy (N/A, 12/4/2018); Colonoscopy (N/A, 12/4/2018); Coronary angioplasty with stent (12/04/2018); Upper gastrointestinal endoscopy (N/A, 3/6/2019); Upper gastrointestinal endoscopy (N/A, 4/3/2019); Upper gastrointestinal endoscopy (N/A, 6/5/2019); Inguinal hernia repair (Left, 12/11/15);  Hand surgery Oximeter put on patient before IV started.  Patient lying flat.  Patient became diaphoretic and HR dropped to 46 during IV start.  Patient remained responsive. Cool cloth to forehead and back of neck.  Fluids run wide open for about 5 minutes.  After 5 minutes patient felt normal.  HR 72.  IV fluids TKO.  HOB raised to 45 degrees.   (Right, 2013); ventral hernia repair (N/A, 11/18/2019); Upper gastrointestinal endoscopy (N/A, 12/18/2019); back surgery (11/25/2016); back surgery (2020); Upper gastrointestinal endoscopy (N/A, 9/2/2020); Upper gastrointestinal endoscopy (N/A, 3/3/2021); Upper gastrointestinal endoscopy (N/A, 9/1/2021); ventral hernia repair (N/A, 12/10/2021); Endoscopy, colon, diagnostic; Upper gastrointestinal endoscopy (N/A, 3/2/2022); and hernia repair (Right, 4/6/2022). Social History:   reports that he has never smoked. He has never used smokeless tobacco. He reports previous alcohol use. He reports that he does not use drugs. Family History:  family history includes Diabetes in his brother and father; Heart Disease in his father; Kidney Disease in his mother; No Known Problems in his brother. Home Medications:  Prior to Admission medications    Medication Sig Start Date End Date Taking?  Authorizing Provider   lansoprazole (PREVACID) 30 MG delayed release capsule Take 30 mg by mouth daily   Yes Historical Provider, MD   losartan (COZAAR) 100 MG tablet Take 100 mg by mouth daily   Yes Historical Provider, MD   spironolactone (ALDACTONE) 50 MG tablet Take 50 mg by mouth daily   Yes Historical Provider, MD   vitamin B-12 (CYANOCOBALAMIN) 1000 MCG tablet Take 1,000 mcg by mouth daily   Yes Historical Provider, MD   methocarbamol (ROBAXIN) 750 MG tablet Take 750 mg by mouth every 8 hours as needed 4/1/19  Yes Historical Provider, MD   aspirin 81 MG tablet Take 1 tablet by mouth daily 4/22/19  Yes Howard Fraga MD   atorvastatin (LIPITOR) 40 MG tablet Take 1 tablet by mouth nightly 1/30/19  Yes PATRICK Claros CNP   glipiZIDE (GLUCOTROL) 10 MG tablet Take 10 mg by mouth daily    Yes Historical Provider, MD   metFORMIN (GLUCOPHAGE) 1000 MG tablet Take 1 tablet by mouth 2 times daily (with meals) 12/7/18  Yes Alba Hill MD   nadolol (CORGARD) 80 MG tablet Take 1 tablet by mouth daily  Patient taking differently: Take 40 mg by mouth daily 40 mg daily 12/6/18  Yes Maxine López MD   ferrous sulfate 325 (65 Fe) MG tablet Take 325 mg by mouth 2 times daily    Yes Historical Provider, MD   Multiple Vitamins-Minerals (MULTIVITAMIN PO) Take 1 tablet by mouth daily   Yes Historical Provider, MD   CINNAMON PO Take 1,000 mg by mouth 2 times daily   Yes Historical Provider, MD   glucose blood VI test strips (FREESTYLE LITE) strip Test blood sugar once daily. 10/6/14  Yes America Tejada MD        Allergies:  Patient has no known allergies. Review of Systems:   · Constitutional: there has been no unanticipated weight loss. There's been no change in energy level, sleep pattern, or activity level. · Eyes: No visual changes or diplopia. No scleral icterus. · ENT: No Headaches, hearing loss or vertigo. No mouth sores or sore throat. · Cardiovascular: Reviewed in HPI  · Respiratory: No cough or wheezing, no sputum production. No hematemesis. · Gastrointestinal: No abdominal pain, appetite loss, blood in stools. No change in bowel or bladder habits. · Genitourinary: No dysuria, trouble voiding, or hematuria. · Musculoskeletal:  No gait disturbance, weakness or joint complaints. · Integumentary: No rash or pruritis. · Neurological: No headache, diplopia, change in muscle strength, numbness or tingling. No change in gait, balance, coordination, mood, affect, memory, mentation, behavior. · Psychiatric: No anxiety, no depression. · Endocrine: No malaise, fatigue or temperature intolerance. No excessive thirst, fluid intake, or urination. No tremor. · Hematologic/Lymphatic: No abnormal bruising or bleeding, blood clots or swollen lymph nodes. · Allergic/Immunologic: No nasal congestion or hives.     Physical Examination:    Vitals:    06/09/22 0733   BP: 122/60   Pulse: 74   SpO2: 100%        Wt Readings from Last 1 Encounters:   06/09/22 238 lb 8 oz (108.2 kg)       Constitutional and General Appearance: NAD  Skin:good turgor,intact without lesions  HEENT: EOMI ,normal  Neck:no JVD    Respiratory:  · Normal excursion and expansion without use of accessory muscles  · Resp Auscultation: Normal breath sounds without dullness  Cardiovascular:  · The apical impulses not displaced  · Heart tones are crisp and normal  · Cervical veins are not engorged  · The carotid upstroke is normal in amplitude and contour without delay or bruit  · Peripheral pulses are symmetrical and full  · There is no clubbing, cyanosis of the extremities. · No edema  · Femoral Arteries: 2+ and equal  · Pedal Pulses: 2+ and equal   Abdomen:  · No masses or tenderness  · Liver/Spleen: No Abnormalities Noted  Neurological/Psychiatric:  · Alert and oriented in all spheres  · Moves all extremities well  · Exhibits normal gait balance and coordination  · No abnormalities of mood, affect, memory, mentation, or behavior are noted    Abd u/s 12/4/2018  Aorta is poorly visualized with normal caliber proximally and in the    midportion. [facility] 12/5/18   Patient with markedly abnormal stress with inferior ischemia. Cath done with 75% prox RCA,85% distal RCA. Now post GI w/up with varices identified. GI service cleared him for dual antiplatelet therapy ,now post transfusion     PCI with VOLODYMYR x 2 in prox and distal RCA. He will need dual antiplatelet therapy, prefer for 6 months but at least 1 month    Stress echo 11/14/2019  -Normal stress echocardiogram study.   -Target heart rate not achieved. -Normal left ventricle size, wall thickness, and systolic function with an estimated ejection fraction of 55%. There is concentric left ventricular hypertrophy.   -Mild mitral regurgitation.   -Trivial aortic regurgitation.   -Mild tricuspid regurgitation with PASP of 30 mmHg. SPECT 6/18/21  Summary    Normal myocardial perfusion study. Normal LV size and systolic function. Assessment:    1.  Coronary artery disease involving native coronary artery of native heart without angina pectoris  H(X) NSTEMI: metoprolol changed to nadolol (cirrhosis & varices)  Not on Plavix due to his bleeding issues  s/p PTCA prox & distal RCA Dec 2018  Stress echo 11/14/2019> Normal, EF 55%  SPECT 6/2021 without ischemia  Continue on baby ASA. Denies anginal symptoms, which he reports was dyspnea at time of 2018 stenting. 2.Essential hypertension  /60   Pulse 74   Ht 6' 2\" (1.88 m)   Wt 238 lb 8 oz (108.2 kg)   SpO2 100%   BMI 30.62 kg/m²   controlled  Continue current medication regimen. 3. Mixed hyperlipidemia    8/2021: , , HDL 32, LDL 62  AST 37 ALT 37  2/12/22  TG 93 HDL 37 LDL 54. Continue on Lipitor 40 mg.      4. DM  A1C 7.2 (8/2021)  A1C 5.4 (2/2022)  Managed by PCP      Plan:    Cardiac test and lab results personally reviewed by me during this office visit and discussed. No med changes. Continue risk factor modifications. Call for any change in symptoms. Return for regular follow up in 6 mos    I appreciate the opportunity of cooperating in the care of this individual.      Samara George M.D., [facility]    Patient's problem list, medications, allergies, past medical, surgical, social and family histories were reviewed and updated as appropriate. Scribe's attestation: This note was scribed in the presence of Dr Kiran George by Panfilo Garner RN. The scribe's documentation has been prepared under my direction and personally reviewed by me in its entirety. I confirm that the note above accurately reflects all work, treatment, procedures, and medical decision making performed by me.

## 2022-06-06 ENCOUNTER — HOSPITAL ENCOUNTER (OUTPATIENT)
Age: 72
Discharge: HOME | End: 2022-06-06
Payer: MEDICARE

## 2022-06-06 DIAGNOSIS — R10.9 RIGHT LATERAL ABDOMINAL PAIN: ICD-10-CM

## 2022-06-06 DIAGNOSIS — E78.49 OTHER HYPERLIPIDEMIA: ICD-10-CM

## 2022-06-06 LAB
ANION GAP SERPL CALCULATED.3IONS-SCNC: 15 MMOL/L (ref 3–16)
BUN BLDV-MCNC: 9 MG/DL (ref 7–20)
CALCIUM SERPL-MCNC: 9.2 MG/DL (ref 8.3–10.6)
CHLORIDE BLD-SCNC: 106 MMOL/L (ref 99–110)
CHOLESTEROL, TOTAL: 99 MG/DL (ref 0–199)
CO2: 20 MMOL/L (ref 21–32)
CREAT SERPL-MCNC: 0.9 MG/DL (ref 0.8–1.3)
GFR AFRICAN AMERICAN: >60
GFR NON-AFRICAN AMERICAN: >60
GLUCOSE BLD-MCNC: 121 MG/DL (ref 70–99)
HDLC SERPL-MCNC: 31 MG/DL (ref 40–60)
LDL CHOLESTEROL CALCULATED: 54 MG/DL
POTASSIUM SERPL-SCNC: 4 MMOL/L (ref 3.5–5.1)
SODIUM BLD-SCNC: 141 MMOL/L (ref 136–145)
TRIGL SERPL-MCNC: 69 MG/DL (ref 0–150)
VLDLC SERPL CALC-MCNC: 14 MG/DL

## 2022-06-06 PROCEDURE — 80048 BASIC METABOLIC PNL TOTAL CA: CPT

## 2022-06-06 PROCEDURE — 80061 LIPID PANEL: CPT

## 2022-06-06 PROCEDURE — 36415 COLL VENOUS BLD VENIPUNCTURE: CPT

## 2022-06-09 ENCOUNTER — OFFICE VISIT (OUTPATIENT)
Dept: CARDIOLOGY CLINIC | Age: 72
End: 2022-06-09
Payer: MEDICARE

## 2022-06-09 VITALS
WEIGHT: 238.5 LBS | HEART RATE: 74 BPM | HEIGHT: 74 IN | SYSTOLIC BLOOD PRESSURE: 122 MMHG | DIASTOLIC BLOOD PRESSURE: 60 MMHG | OXYGEN SATURATION: 100 % | BODY MASS INDEX: 30.61 KG/M2

## 2022-06-09 DIAGNOSIS — I25.10 CORONARY ARTERY DISEASE INVOLVING NATIVE CORONARY ARTERY OF NATIVE HEART WITHOUT ANGINA PECTORIS: Primary | ICD-10-CM

## 2022-06-09 DIAGNOSIS — E78.49 OTHER HYPERLIPIDEMIA: ICD-10-CM

## 2022-06-09 DIAGNOSIS — I10 PRIMARY HYPERTENSION: ICD-10-CM

## 2022-06-09 PROCEDURE — G8427 DOCREV CUR MEDS BY ELIG CLIN: HCPCS | Performed by: INTERNAL MEDICINE

## 2022-06-09 PROCEDURE — G8417 CALC BMI ABV UP PARAM F/U: HCPCS | Performed by: INTERNAL MEDICINE

## 2022-06-09 PROCEDURE — 1036F TOBACCO NON-USER: CPT | Performed by: INTERNAL MEDICINE

## 2022-06-09 PROCEDURE — 1123F ACP DISCUSS/DSCN MKR DOCD: CPT | Performed by: INTERNAL MEDICINE

## 2022-06-09 PROCEDURE — 99214 OFFICE O/P EST MOD 30 MIN: CPT | Performed by: INTERNAL MEDICINE

## 2022-06-09 PROCEDURE — 3017F COLORECTAL CA SCREEN DOC REV: CPT | Performed by: INTERNAL MEDICINE

## 2022-06-09 NOTE — PATIENT INSTRUCTIONS
No medication changes. Continue risk factor modifications. Call for any change in symptoms.   Return for regular follow up in 6 mos

## 2022-07-27 NOTE — PROGRESS NOTES
* Admitted with diarrhea? [] YES    [x]  NO     *Prior history of C-Diff. In last 3 months? [] YES    [x]  NO     *Antibiotic use in the past 6-8 weeks? [x]  NO    []  YES      If yes, which: REASON_________________     *Prior hospitalization or nursing home in the last month? []  YES    [x]  NO     SAFETY FIRST. .call before you fall    [address] time____0730________        Surgery time_0900___________    Do not eat or drink anything after 12:00 midnight prior to your surgery. This includes water chewing gum, mints and ice chips- the Day of Surgery. You may brush your teeth and gargle the morning of your surgery, but do not swallow the water     Please see your family doctor/pediatrician for a history and physical and/or questions concerning medications. Bring any test results/reports from your physicians office. If you are under the care of a heart doctor or specialist doctor, please be aware that you may be asked to them for clearance    You may be asked to stop blood thinners such as Coumadin, Plavix, Fragmin, Lovenox, etc., or any anti-inflammatories such as:  Aspirin, Ibuprofen, Advil, Naproxen prior to your surgery. We also ask that you stop any OTC medications such as fish oil, vitamin E, glucosamine, garlic, Multivitamins, COQ 10, etc.    We ask that you do not smoke 24 hours prior to surgery  We ask that you do not  drink any alcoholic beverages 24 hours prior to surgery     You must make arrangements for a responsible adult to take you home after your surgery. For your safety you will not be allowed to leave alone or drive yourself home. Your surgery will be cancelled if you do not have a ride home. Also for your safety, it is strongly suggested that someone stay with you the first 24 hours after your surgery.      A parent or legal guardian must accompany a child scheduled for surgery and plan to stay at the hospital until the child is discharged. Please do not bring other children with you. For your comfort, please wear simple loose fitting clothing to the hospital.  Please do not bring valuables. Do not wear any make-up or nail polish on your fingers or toes. For your safety, please do not wear any jewelry or body piercing's on the day of surgery. All jewelry must be removed. If you have dentures, they will be removed before going to operating room. For your convenience, we will provide you with a container. If you wear contact lenses or glasses, they will be removed, please bring a case for them. If you have a living will and a durable power of  for healthcare, please bring in a copy. As part of our patient safety program to minimize surgical site infections, we ask you to do the following:    Please notify your surgeon if you develop any illness between         now and the day of your surgery. This includes a cough, cold, fever, sore throat, nausea,         or vomiting, and diarrhea, etc.   Please notify your surgeon if you experience dizziness, shortness         of breath or blurred vision between now and the time of your surgery. Do not shave your operative site 96 hours prior to surgery. For face and neck surgery, men may use an electric razor 48 hours   prior to surgery. You may shower the night before surgery or the morning of   your surgery with an antibacterial soap. You will need to bring a photo ID and insurance card     If you use a C-pap or Bi-pap machine, please bring your machine with you to the hospital     Our goal is to provide you with excellent care, therefore, visitors will be limited to so that we may focus on providing this care for you. Please contact your surgeon office, if you have any further questions.                  [facility] phone number:  [address] PAT fax number:  [phone]    Please note these are generalized instructions for all surgical cases, you may be provided with more specific instructions according to your surgery.

## 2022-08-02 ENCOUNTER — ANESTHESIA EVENT (OUTPATIENT)
Dept: ENDOSCOPY | Age: 72
End: 2022-08-02
Payer: MEDICARE

## 2022-08-03 ENCOUNTER — ANESTHESIA (OUTPATIENT)
Dept: ENDOSCOPY | Age: 72
End: 2022-08-03
Payer: MEDICARE

## 2022-08-03 ENCOUNTER — HOSPITAL ENCOUNTER (OUTPATIENT)
Age: 72
Setting detail: OUTPATIENT SURGERY
Discharge: HOME | End: 2022-08-03
Attending: INTERNAL MEDICINE | Admitting: INTERNAL MEDICINE
Payer: MEDICARE

## 2022-08-03 VITALS
BODY MASS INDEX: 30.9 KG/M2 | WEIGHT: 240.74 LBS | DIASTOLIC BLOOD PRESSURE: 60 MMHG | SYSTOLIC BLOOD PRESSURE: 122 MMHG | RESPIRATION RATE: 14 BRPM | TEMPERATURE: 97.4 F | OXYGEN SATURATION: 98 % | HEIGHT: 74 IN | HEART RATE: 68 BPM

## 2022-08-03 LAB
BASOPHILS ABSOLUTE: 0 K/UL (ref 0–0.2)
BASOPHILS RELATIVE PERCENT: 0.3 %
EOSINOPHILS ABSOLUTE: 0.1 K/UL (ref 0–0.6)
EOSINOPHILS RELATIVE PERCENT: 3.1 %
GLUCOSE BLD-MCNC: 103 MG/DL (ref 70–99)
GLUCOSE BLD-MCNC: 115 MG/DL (ref 70–99)
HCT VFR BLD CALC: 32.4 % (ref 40.5–52.5)
HEMOGLOBIN: 11 G/DL (ref 13.5–17.5)
INR BLD: 1.39 (ref 0.87–1.14)
LYMPHOCYTES ABSOLUTE: 0.4 K/UL (ref 1–5.1)
LYMPHOCYTES RELATIVE PERCENT: 12.1 %
MCH RBC QN AUTO: 29.9 PG (ref 26–34)
MCHC RBC AUTO-ENTMCNC: 34 G/DL (ref 31–36)
MCV RBC AUTO: 87.9 FL (ref 80–100)
MONOCYTES ABSOLUTE: 0.4 K/UL (ref 0–1.3)
MONOCYTES RELATIVE PERCENT: 10.1 %
NEUTROPHILS ABSOLUTE: 2.8 K/UL (ref 1.7–7.7)
NEUTROPHILS RELATIVE PERCENT: 74.4 %
PDW BLD-RTO: 15.3 % (ref 12.4–15.4)
PERFORMED ON: ABNORMAL
PERFORMED ON: ABNORMAL
PLATELET # BLD: 93 K/UL (ref 135–450)
PMV BLD AUTO: 7.8 FL (ref 5–10.5)
PROTHROMBIN TIME: 17 SEC (ref 11.7–14.5)
RBC # BLD: 3.69 M/UL (ref 4.2–5.9)
WBC # BLD: 3.7 K/UL (ref 4–11)

## 2022-08-03 PROCEDURE — 2580000003 HC RX 258: Performed by: ANESTHESIOLOGY

## 2022-08-03 PROCEDURE — 3700000000 HC ANESTHESIA ATTENDED CARE: Performed by: INTERNAL MEDICINE

## 2022-08-03 PROCEDURE — 6370000000 HC RX 637 (ALT 250 FOR IP)

## 2022-08-03 PROCEDURE — 3609027000 HC COLONOSCOPY: Performed by: INTERNAL MEDICINE

## 2022-08-03 PROCEDURE — 3700000001 HC ADD 15 MINUTES (ANESTHESIA): Performed by: INTERNAL MEDICINE

## 2022-08-03 PROCEDURE — 2580000003 HC RX 258: Performed by: NURSE ANESTHETIST, CERTIFIED REGISTERED

## 2022-08-03 PROCEDURE — 6360000002 HC RX W HCPCS: Performed by: NURSE ANESTHETIST, CERTIFIED REGISTERED

## 2022-08-03 PROCEDURE — 6370000000 HC RX 637 (ALT 250 FOR IP): Performed by: INTERNAL MEDICINE

## 2022-08-03 PROCEDURE — 7100000010 HC PHASE II RECOVERY - FIRST 15 MIN: Performed by: INTERNAL MEDICINE

## 2022-08-03 PROCEDURE — 7100000001 HC PACU RECOVERY - ADDTL 15 MIN: Performed by: INTERNAL MEDICINE

## 2022-08-03 PROCEDURE — 2709999900 HC NON-CHARGEABLE SUPPLY: Performed by: INTERNAL MEDICINE

## 2022-08-03 PROCEDURE — 36415 COLL VENOUS BLD VENIPUNCTURE: CPT

## 2022-08-03 PROCEDURE — 85025 COMPLETE CBC W/AUTO DIFF WBC: CPT

## 2022-08-03 PROCEDURE — 7100000000 HC PACU RECOVERY - FIRST 15 MIN: Performed by: INTERNAL MEDICINE

## 2022-08-03 PROCEDURE — 85610 PROTHROMBIN TIME: CPT

## 2022-08-03 PROCEDURE — 7100000011 HC PHASE II RECOVERY - ADDTL 15 MIN: Performed by: INTERNAL MEDICINE

## 2022-08-03 RX ORDER — PROPOFOL 10 MG/ML
INJECTION, EMULSION INTRAVENOUS PRN
Status: DISCONTINUED | OUTPATIENT
Start: 2022-08-03 | End: 2022-08-03 | Stop reason: SDUPTHER

## 2022-08-03 RX ORDER — SODIUM CHLORIDE 0.9 % (FLUSH) 0.9 %
5-40 SYRINGE (ML) INJECTION PRN
Status: CANCELLED | OUTPATIENT
Start: 2022-08-03

## 2022-08-03 RX ORDER — SODIUM CHLORIDE 9 MG/ML
INJECTION, SOLUTION INTRAVENOUS CONTINUOUS PRN
Status: DISCONTINUED | OUTPATIENT
Start: 2022-08-03 | End: 2022-08-03 | Stop reason: SDUPTHER

## 2022-08-03 RX ORDER — SIMETHICONE 20 MG/.3ML
EMULSION ORAL PRN
Status: DISCONTINUED | OUTPATIENT
Start: 2022-08-03 | End: 2022-08-03 | Stop reason: ALTCHOICE

## 2022-08-03 RX ORDER — SODIUM CHLORIDE 9 MG/ML
INJECTION, SOLUTION INTRAVENOUS PRN
Status: DISCONTINUED | OUTPATIENT
Start: 2022-08-03 | End: 2022-08-03 | Stop reason: HOSPADM

## 2022-08-03 RX ORDER — ONDANSETRON 2 MG/ML
4 INJECTION INTRAMUSCULAR; INTRAVENOUS
Status: CANCELLED | OUTPATIENT
Start: 2022-08-03 | End: 2022-08-03

## 2022-08-03 RX ORDER — DROPERIDOL 2.5 MG/ML
0.62 INJECTION, SOLUTION INTRAMUSCULAR; INTRAVENOUS
Status: CANCELLED | OUTPATIENT
Start: 2022-08-03 | End: 2022-08-03

## 2022-08-03 RX ORDER — SODIUM CHLORIDE 0.9 % (FLUSH) 0.9 %
5-40 SYRINGE (ML) INJECTION PRN
Status: DISCONTINUED | OUTPATIENT
Start: 2022-08-03 | End: 2022-08-03 | Stop reason: HOSPADM

## 2022-08-03 RX ORDER — PROPOFOL 10 MG/ML
INJECTION, EMULSION INTRAVENOUS CONTINUOUS PRN
Status: DISCONTINUED | OUTPATIENT
Start: 2022-08-03 | End: 2022-08-03 | Stop reason: SDUPTHER

## 2022-08-03 RX ORDER — SODIUM CHLORIDE 0.9 % (FLUSH) 0.9 %
5-40 SYRINGE (ML) INJECTION EVERY 12 HOURS SCHEDULED
Status: DISCONTINUED | OUTPATIENT
Start: 2022-08-03 | End: 2022-08-03 | Stop reason: HOSPADM

## 2022-08-03 RX ORDER — SODIUM CHLORIDE 9 MG/ML
INJECTION, SOLUTION INTRAVENOUS PRN
Status: CANCELLED | OUTPATIENT
Start: 2022-08-03

## 2022-08-03 RX ORDER — SODIUM CHLORIDE 9 MG/ML
INJECTION, SOLUTION INTRAVENOUS CONTINUOUS
Status: DISCONTINUED | OUTPATIENT
Start: 2022-08-03 | End: 2022-08-03 | Stop reason: HOSPADM

## 2022-08-03 RX ORDER — SODIUM CHLORIDE 0.9 % (FLUSH) 0.9 %
5-40 SYRINGE (ML) INJECTION EVERY 12 HOURS SCHEDULED
Status: CANCELLED | OUTPATIENT
Start: 2022-08-03

## 2022-08-03 RX ADMIN — SODIUM CHLORIDE: 9 INJECTION, SOLUTION INTRAVENOUS at 08:06

## 2022-08-03 RX ADMIN — PROPOFOL 80 MG: 10 INJECTION, EMULSION INTRAVENOUS at 09:17

## 2022-08-03 RX ADMIN — SODIUM CHLORIDE: 9 INJECTION, SOLUTION INTRAVENOUS at 09:03

## 2022-08-03 RX ADMIN — PROPOFOL 180 MCG/KG/MIN: 10 INJECTION, EMULSION INTRAVENOUS at 09:17

## 2022-08-03 ASSESSMENT — ENCOUNTER SYMPTOMS: SHORTNESS OF BREATH: 0

## 2022-08-03 ASSESSMENT — PAIN - FUNCTIONAL ASSESSMENT: PAIN_FUNCTIONAL_ASSESSMENT: 0-10

## 2022-08-03 NOTE — OP NOTE
Operative Note      Patient: Juvenal Reyes  YOB: 1950  MRN: [id number]    Date of Procedure: 8/3/2022    Pre-Op Diagnosis: HISTORY OF COLON POLYPS    Post-Op Diagnosis: Same       Procedure(s):  COLONOSCOPY    Surgeon(s):  Adilene Werner MD    Assistant:   * No surgical staff found *    Anesthesia: Monitor Anesthesia Care    Estimated Blood Loss (mL): None    Complications: None    Specimens:   * No specimens in log *    Implants:  * No implants in log *      Drains: * No LDAs found *    Findings: See dictated report    Detailed Description of Procedure:   See dictated report    Electronically signed by Adilene Werner MD on 8/3/2022 at 9:41 AM

## 2022-08-03 NOTE — PROCEDURES
[address]                               COLONOSCOPY REPORT    PATIENT NAME: Faith Sharma                   :        1950  MED REC NO:   [id number]                          ROOM:  ACCOUNT NO:   [de-identified]                           ADMIT DATE: 2022  PROVIDER:     Nicanor Degroot MD    DATE OF PROCEDURE:  2022    REFERRING PROVIDER:  India Olszewski, MD    PATIENT HISTORY:  This is a 64-year-old male, outpatient. INSTRUMENT USED:  Olympus PCF-H190L. MEDICATIONS OF PROCEDURE:  The patient was premedicated with Diprivan  intravenously as administered by the anesthesiology service. INDICATIONS:  The patient has a history of colonic polyps. DESCRIPTION OF PROCEDURE:  The digital and anal exams were remarkable  for hemorrhoidal tags. The colonoscope was inserted to the cecum. The  prep was alternately good to fair. Where the prep was fair, lesions  such as small polyps or vascular ectasias could have been missed. There  were a few incidental sigmoid diverticula. External hemorrhoids were  also noted. No mucosal lesions were identified. However, note was made  of mucosal friability with simple endoscope trauma. ESTIMATED BLOOD LOSS:  None. IMPRESSION:  1. Sigmoid diverticulosis. 2.  External hemorrhoids. 3.  A somewhat friable colonic mucosa. PLAN:  The patient should undergo a surveillance colonoscopy in five  years, health permitting. He has underlying cirrhosis. Given the  colonic mucosal friability, I will check a CBC and INR. KEITH Lynch MD    D: 2022 9:55:33       T: 2022 9:58:58     [id number]  Job#: [id number]     Doc#: [id number]    CC:  Nicanor Galeana MD

## 2022-08-03 NOTE — H&P
[facility]   Pre-operative History and Physical    Patient: Dora Macias  : 1950  Acct#:         HISTORY OF PRESENT ILLNESS:    The patient is a 74 y.o. male  who presents with polyp surveillance  Past Medical History:        Diagnosis Date    Arthritis     CAD (coronary artery disease)     History of blood transfusion     Hyperlipidemia     Hypertension     MARIE (nonalcoholic steatohepatitis)     Osteoarthritis     Spinal stenosis     Thrombocytopenia (HCC)     Type 2 diabetes mellitus without complication ([facility]     Type II or unspecified type diabetes mellitus without mention of complication, not stated as uncontrolled     type 2    Wears glasses      Past Surgical History:        Procedure Laterality Date    BACK SURGERY  2016    L3, L4,L5 remove spurs    BACK SURGERY      L2, L3 released pressure from nerve    COLONOSCOPY  2018    wiht polypectomy x3    COLONOSCOPY N/A 2018    COLONOSCOPY POLYPECTOMY SNARE/COLD BIOPSY performed by Jennifer Jacinto MD at [address] [place] [address], [address]  12/04/2018    x2 stents-70% and 85% blocked    CYST REMOVAL Right     on arm    ENDOSCOPY, COLON, DIAGNOSTIC      HAND SURGERY Right     dupuytren syndrome    HERNIA REPAIR Right 2022    LAPAROSCOPIC RIGHT INGUINAL HERNIA REPAIR WITH MESH performed by Kourtney Chung MD at [address] Left 12/11/15    laparoscopic    UPPER GASTROINTESTINAL ENDOSCOPY  2018    with biopsy    UPPER GASTROINTESTINAL ENDOSCOPY N/A 2018    EGD BIOPSY performed by Jennifer Jacinto MD at [address] N/A 3/6/2019    EGD BAND LIGATION performed by Denton Owens MD at [address] N/A 4/3/2019    ESOPHAGOGASTRODUODENOSCOPY WITH BANDING performed by Denton Owens MD at [address] N/A 2019 ESOPHAGOGASTRODUODENOSCOPY performed by Avila Wu MD at [address] [place] [address] 12/18/2019    EGD ESOPHAGOGASTRODUODENOSCOPY performed by Avila Wu MD at [address] [place] [address] 9/2/2020    EGD BIOPSY performed by Avila Wu MD at [address] 3/3/2021    ESOPHAGOGASTRODUODENOSCOPY performed by Avila Wu MD at [address] [place] [address] 9/1/2021    ESOPHAGOGASTRODUODENOSCOPY performed by Avila Wu MD at [address] [place] [address] N/A 3/2/2022    ESOPHAGOGASTRODUODENOSCOPY performed by Avila Wu MD at [address] N/A 11/18/2019    OPEN REPAIR OF INCARCERATED VENTRAL HERNIA WITH MESH performed by Beka Otto MD at [facility] N/A 12/10/2021    LAPAROSCOPIC RECURRENT [address] performed by Beka Otto MD at [address] [facility]     Medications prior to admission:   Prior to Admission medications    Medication Sig Start Date End Date Taking?  Authorizing Provider   lansoprazole (PREVACID) 30 MG delayed release capsule Take 30 mg by mouth daily    Historical Provider, MD   losartan (COZAAR) 100 MG tablet Take 100 mg by mouth daily    Historical Provider, MD   spironolactone (ALDACTONE) 50 MG tablet Take 50 mg by mouth daily    Historical Provider, MD   vitamin B-12 (CYANOCOBALAMIN) 1000 MCG tablet Take 1,000 mcg by mouth daily    Historical Provider, MD   methocarbamol (ROBAXIN) 750 MG tablet Take 750 mg by mouth every 8 hours as needed 4/1/19   Historical Provider, MD   aspirin 81 MG tablet Take 1 tablet by mouth daily 4/22/19   Sabrina Roy MD   atorvastatin (LIPITOR) 40 MG tablet Take 1 tablet by mouth nightly 1/30/19   PATRICK Tuttle - ANAYA   glipiZIDE (GLUCOTROL) 10 MG tablet Take 10 mg by mouth daily     Historical Provider, MD metFORMIN (GLUCOPHAGE) 1000 MG tablet Take 1 tablet by mouth 2 times daily (with meals) 12/7/18   Hudson Colon MD   nadolol (CORGARD) 80 MG tablet Take 1 tablet by mouth daily  Patient taking differently: Take 40 mg by mouth in the morning. 40 mg daily. 12/6/18   Hudson Colon MD   ferrous sulfate 325 (65 Fe) MG tablet Take 325 mg by mouth 2 times daily     Historical Provider, MD   Multiple Vitamins-Minerals (MULTIVITAMIN PO) Take 1 tablet by mouth daily    Historical Provider, MD   CINNAMON PO Take 1,000 mg by mouth 2 times daily    Historical Provider, MD   glucose blood VI test strips (FREESTYLE LITE) strip Test blood sugar once daily. 10/6/14   Georgia Argueta MD     Allergies:    Patient has no known allergies.   Social History:   Social History     Socioeconomic History    Marital status:      Spouse name: Katty Castillo    Number of children: Not on file    Years of education: 16    Highest education level: Not on file   Occupational History    Not on file   Tobacco Use    Smoking status: Never    Smokeless tobacco: Never   Vaping Use    Vaping Use: Never used   Substance and Sexual Activity    Alcohol use: Not Currently     Alcohol/week: 0.0 standard drinks     Comment: quit drinking 1990's    Drug use: Never    Sexual activity: Yes     Partners: Female   Other Topics Concern    Not on file   Social History Narrative    Not on file     Social Determinants of Health     Financial Resource Strain: Not on file   Food Insecurity: Not on file   Transportation Needs: Not on file   Physical Activity: Not on file   Stress: Not on file   Social Connections: Not on file   Intimate Partner Violence: Not on file   Housing Stability: Not on file           Family History:   Family History   Problem Relation Age of Onset    Kidney Disease Mother     Heart Disease Father     Diabetes Father     No Known Problems Brother     Diabetes Brother          PHYSICAL EXAM:      /60   Pulse 68   Temp 97.4 °F (36.3 °C) (Temporal)   Resp 14   Ht 6' 2\" (1.88 m)   Wt 240 lb 11.9 oz (109.2 kg)   SpO2 98%   BMI 30.91 kg/m²  I        Heart: Normal    Lungs: Normal    Abdomen: Normal      ASA Grade: ASA 3 - Patient with moderate systemic disease with functional limitations    II (soft palate, uvula, fauces visible)  ASSESSMENT AND PLAN:    1. Patient is a 74 y.o. male here for Colonoscopy  2. Procedure options, risks and benefits reviewed with patient who expresses understanding.

## 2022-08-03 NOTE — BRIEF OP NOTE
Brief Postoperative Note    Pedro Carmen  YOB: 1950  [id number]      Pre-operative Diagnosis: Polyp surveillance    Previous Colonoscopy: Yes  When: unknown  Greater than 3 years? Yes      Post-operative Diagnosis: Same    Procedure: Colonoscopy    The preparation was good.     Anesthesia: MAC    Surgeons/Assistants: Rajinder Vallecillo MD    Estimated Blood Loss: None    Complications: None    Specimens: Was Not Obtained    Findings: See dictated report    Electronically signed by Rajinder Vallecillo MD on 7/10/2017 at 7:45 AM

## 2022-08-03 NOTE — DISCHARGE INSTRUCTIONS
[facility] Discharge Instructions  Colonoscopy    NAME:  Juvenal Reyes  YOB: 1950  MEDICAL RECORD NUMBER:  [id number]  DATE:  8/3/2022      After receiving Propofol (Diprivan) for Moderate Sedation:    Do not drive or operate any machinery until tomorrow  Do not sign any legal documents or make any critical decisions  Do not drink alcoholic beverages for 24 hours  Plan to spend a few hours resting before resuming your normal routine  Possible side effects are light headedness and sedation    You may resume your usual diet at home    Resume all your daily medications    Call your physician if any of the following occur:    Severe abdominal distention and/or pain. (Mild distention or cramping is normal after this procedure; this should improve within an hour or two with passage of air)  Fever, chills, nausea or vomiting  You may notice a small amount of blood in your next few bowel movements    If excessive bleeding occurs:  Call your physician immediately or proceed to the nearest Emergency Room    Biopsy Obtained: NO      Recommendations: Repeat colonoscopy in 5 years         For questions or concerns please contact your GI physician's 24 hour call center at [phone].

## 2022-08-03 NOTE — ANESTHESIA POSTPROCEDURE EVALUATION
Department of Anesthesiology  Postprocedure Note    Patient: Wilton Whalen  MRN: [id number]  YOB: 1950  Date of evaluation: 8/3/2022      Procedure Summary     Date: 08/03/22 Room / Location: [address] [place] [address]    Anesthesia Start: 6603 Anesthesia Stop: 6186    Procedure: COLONOSCOPY Diagnosis:       History of colon polyps      (HISTORY OF COLON POLYPS)    Surgeons: Ginny Carcamo MD Responsible Provider: Cecile Perez MD    Anesthesia Type: MAC ASA Status: 3          Anesthesia Type: No value filed.     Queenie Phase I: Queenie Score: 9    Queenie Phase II: Queenie Score: 10      Anesthesia Post Evaluation    Patient location during evaluation: PACU  Patient participation: complete - patient participated  Level of consciousness: awake  Airway patency: patent  Nausea & Vomiting: no nausea and no vomiting  Cardiovascular status: blood pressure returned to baseline  Respiratory status: acceptable  Hydration status: stable  Multimodal analgesia pain management approach

## 2022-08-03 NOTE — ANESTHESIA PRE PROCEDURE
Pre-Anesthesia Evaluation/Consultation       Name:  Jose Mo  : 1950  Age:  74 y.o.                                            MRN:  [id number]  Date: 8/3/2022           Surgeon: Surgeon(s):  Kelly Lowery MD    Procedure: Procedure(s):  COLONOSCOPY     No Known Allergies  Patient Active Problem List   Diagnosis    Hypertension    Cyst of joint of hand    High triglycerides    Dupuytren's contracture of right hand    Rosacea    Thrombocytopenia (HCC)    Chronic ITP (idiopathic thrombocytopenia) (HCC)    Iron deficiency anemia    Anemia, iron deficiency    Chest pain    Non-STEMI (non-ST elevated myocardial infarction) [facility]    CAD (coronary artery disease)    Hyperlipidemia    Incarcerated right inguinal hernia    Recurrent incisional hernia with incarceration    Type 2 diabetes mellitus     Past Medical History:   Diagnosis Date    Arthritis     CAD (coronary artery disease)     History of blood transfusion     Hyperlipidemia     Hypertension     MARIE (nonalcoholic steatohepatitis)     Osteoarthritis     Spinal stenosis     Thrombocytopenia (HCC)     Type 2 diabetes mellitus without complication ([facility]     Type II or unspecified type diabetes mellitus without mention of complication, not stated as uncontrolled     type 2    Wears glasses      Past Surgical History:   Procedure Laterality Date    BACK SURGERY  2016    L3, L4,L5 remove spurs    BACK SURGERY      L2, L3 released pressure from nerve    COLONOSCOPY  2018    wiht polypectomy x3    COLONOSCOPY N/A 2018    COLONOSCOPY POLYPECTOMY SNARE/COLD BIOPSY performed by Kobe Alvarez MD at [facility]  12/04/2018    x2 stents-70% and 85% blocked    CYST REMOVAL Right     on arm    ENDOSCOPY, COLON, DIAGNOSTIC      HAND SURGERY Right     dupuytren syndrome    HERNIA REPAIR Right 2022    LAPAROSCOPIC RIGHT INGUINAL HERNIA REPAIR WITH MESH performed by Mireya Montiel MD at [address] [facility] 12/11/15    laparoscopic    UPPER GASTROINTESTINAL ENDOSCOPY  12/04/2018    with biopsy    UPPER GASTROINTESTINAL ENDOSCOPY N/A 12/4/2018    EGD BIOPSY performed by Jelani Erazo MD at [place] [address] N/A 3/6/2019    EGD BAND LIGATION performed by Wynema Snellen, MD at [place] [address] N/A 4/3/2019    ESOPHAGOGASTRODUODENOSCOPY WITH BANDING performed by Wynema Snellen, MD at [place] [address] 6/5/2019    ESOPHAGOGASTRODUODENOSCOPY performed by Wynema Snellen, MD at [place] [address] 12/18/2019    EGD ESOPHAGOGASTRODUODENOSCOPY performed by Wynema Snellen, MD at [place] [address] 9/2/2020    EGD BIOPSY performed by Wynema Snellen, MD at [place] [address] 3/3/2021    ESOPHAGOGASTRODUODENOSCOPY performed by Wynema Snellen, MD at [place] [address] 9/1/2021    ESOPHAGOGASTRODUODENOSCOPY performed by Wynema Snellen, MD at [place] [address] N/A 3/2/2022    ESOPHAGOGASTRODUODENOSCOPY performed by Wynema Snellen, MD at [address] [facility] N/A 11/18/2019    OPEN REPAIR OF INCARCERATED VENTRAL HERNIA WITH MESH performed by Mireya Montiel MD at [address] [place] [address] N/A 12/10/2021    LAPAROSCOPIC RECURRENT [address] performed by Mireya Montiel MD at [address] [facility] History     Tobacco Use    Smoking status: Never    Smokeless tobacco: Never   Vaping Use    Vaping Use: Never used   Substance Use Topics    Alcohol use: Not Currently     Alcohol/week: 0.0 standard drinks     Comment: quit drinking 1990's    Drug use: Never     Medications  Current Facility-Administered Medications on File Prior to Visit   Medication Dose Route Frequency Provider Last Rate Last Admin    0.9 % sodium chloride infusion   IntraVENous Continuous Gerda Perez  mL/hr at 08/03/22 0806 New Bag at 08/03/22 0806    sodium chloride flush 0.9 % injection 5-40 mL  5-40 mL IntraVENous 2 times per day Gerda Perez MD        sodium chloride flush 0.9 % injection 5-40 mL  5-40 mL IntraVENous PRN Gerda Perez MD        0.9 % sodium chloride infusion   IntraVENous PRN Gerda Perez MD         Current Outpatient Medications on File Prior to Visit   Medication Sig Dispense Refill    lansoprazole (PREVACID) 30 MG delayed release capsule Take 30 mg by mouth daily      losartan (COZAAR) 100 MG tablet Take 100 mg by mouth daily      spironolactone (ALDACTONE) 50 MG tablet Take 50 mg by mouth daily      vitamin B-12 (CYANOCOBALAMIN) 1000 MCG tablet Take 1,000 mcg by mouth daily      methocarbamol (ROBAXIN) 750 MG tablet Take 750 mg by mouth every 8 hours as needed      aspirin 81 MG tablet Take 1 tablet by mouth daily 90 tablet 3    atorvastatin (LIPITOR) 40 MG tablet Take 1 tablet by mouth nightly 90 tablet 3    glipiZIDE (GLUCOTROL) 10 MG tablet Take 10 mg by mouth daily       metFORMIN (GLUCOPHAGE) 1000 MG tablet Take 1 tablet by mouth 2 times daily (with meals) 180 tablet 1    nadolol (CORGARD) 80 MG tablet Take 1 tablet by mouth daily (Patient taking differently: Take 40 mg by mouth in the morning. 40 mg daily.) 30 tablet 1    ferrous sulfate 325 (65 Fe) MG tablet Take 325 mg by mouth 2 times daily       Multiple Vitamins-Minerals (MULTIVITAMIN PO) Take 1 tablet by mouth daily      CINNAMON PO Take 1,000 mg by mouth 2 times daily      glucose blood VI test strips (FREESTYLE LITE) strip Test blood sugar once daily. 100 each 3     No current facility-administered medications for this visit. No current outpatient medications on file.      Facility-Administered Medications Ordered in Other Visits   Medication Dose Route Frequency Provider Last Rate Last Admin    0.9 % sodium chloride infusion   IntraVENous Continuous Fannie Valdez  mL/hr at 22 0806 New Bag at 22 08    sodium chloride flush 0.9 % injection 5-40 mL  5-40 mL IntraVENous 2 times per day Fannie Valdez MD        sodium chloride flush 0.9 % injection 5-40 mL  5-40 mL IntraVENous PRN Fannie Valdez MD        0.9 % sodium chloride infusion   IntraVENous PRN Fannie Valdez MD         Vital Signs (Current)   There were no vitals filed for this visit. Vital Signs Statistics (for past 48 hrs)     Temp  Av.1 °F (36.2 °C)  Min: 97.1 °F (36.2 °C)   Min taken time: 22  Max: 97.1 °F (36.2 °C)   Max taken time: 22  Pulse  Av  Min: 67   Min taken time: 22  Max: 67   Max taken time: 22  Resp  Av  Min: 25   Min taken time: 22  Max: 25   Max taken time: 22  BP  Min: 138/64   Min taken time: 22  Max: 138/64   Max taken time: 22  SpO2  Av %  Min: 100 %   Min taken time: 22  Max: 100 %   Max taken time: 22  BP Readings from Last 3 Encounters:   22 138/64   22 122/60   22 (!) 122/58       BMI  There is no height or weight on file to calculate BMI. Estimated body mass index is 30.91 kg/m² as calculated from the following:    Height as of an earlier encounter on 8/3/22: 6' 2\" (1.88 m). Weight as of an earlier encounter on 8/3/22: 240 lb 11.9 oz (109.2 kg).     CBC   Lab Results   Component Value Date/Time    WBC 5.3 12/10/2021 11:10 AM    RBC 4.36 12/10/2021 11:10 AM    RBC 4.03 2016 03:51 PM    HGB 13.6 12/10/2021 11:10 AM    HCT 39.6 12/10/2021 11:10 AM    MCV 90.8 12/10/2021 11:10 AM    RDW 14.6 12/10/2021 11:10 AM    PLT 84 12/10/2021 11:10 AM     CMP    Lab Results   Component Value Date/Time     2022 08:11 AM    K 4.0 06/06/2022 08:11 AM    K 3.8 12/06/2018 05:00 AM     06/06/2022 08:11 AM    CO2 20 06/06/2022 08:11 AM    BUN 9 06/06/2022 08:11 AM    CREATININE 0.9 06/06/2022 08:11 AM    GFRAA >60 06/06/2022 08:11 AM    GFRAA >60 03/08/2013 10:42 AM    AGRATIO 1.4 10/26/2019 10:18 AM    LABGLOM >60 06/06/2022 08:11 AM    GLUCOSE 121 06/06/2022 08:11 AM    PROT 6.8 10/26/2019 10:18 AM    PROT 7.5 03/08/2013 10:42 AM    CALCIUM 9.2 06/06/2022 08:11 AM    BILITOT 1.7 10/26/2019 10:18 AM    ALKPHOS 86 10/26/2019 10:18 AM    AST 33 11/14/2020 08:09 AM    ALT 33 11/14/2020 08:09 AM     BMP    Lab Results   Component Value Date/Time     06/06/2022 08:11 AM    K 4.0 06/06/2022 08:11 AM    K 3.8 12/06/2018 05:00 AM     06/06/2022 08:11 AM    CO2 20 06/06/2022 08:11 AM    BUN 9 06/06/2022 08:11 AM    CREATININE 0.9 06/06/2022 08:11 AM    CALCIUM 9.2 06/06/2022 08:11 AM    GFRAA >60 06/06/2022 08:11 AM    GFRAA >60 03/08/2013 10:42 AM    LABGLOM >60 06/06/2022 08:11 AM    GLUCOSE 121 06/06/2022 08:11 AM     POCGlucose  No results for input(s): GLUCOSE in the last 72 hours.    Coags    Lab Results   Component Value Date/Time    PROTIME 13.0 11/30/2018 04:19 PM    INR 1.14 11/30/2018 04:19 PM     HCG (If Applicable) No results found for: PREGTESTUR, PREGSERUM, HCG, HCGQUANT   ABGs   Lab Results   Component Value Date/Time    PHART 7.219 12/11/2015 08:55 AM    PO2ART 414 12/11/2015 08:55 AM    KGY1DAS 68 12/11/2015 08:55 AM    XWN0ZXA 27.8 12/11/2015 08:55 AM    BEART 0 12/11/2015 08:55 AM    R6JEHOMC 100 12/11/2015 08:55 AM      Type & Screen (If Applicable)  No results found for: LABABO, LABRH                         BMI: Wt Readings from Last 3 Encounters:       NPO Status:                          Anesthesia Evaluation  Patient summary reviewed and Nursing notes reviewed no history of anesthetic complications:   Airway: Mallampati: II  TM distance: >3 FB   Neck ROM: full  Mouth opening: > = 3 FB   Dental: normal exam   (+) upper dentures and lower dentures      Pulmonary: breath sounds clear to auscultation      (-) pneumonia, asthma, shortness of breath, recent URI and sleep apnea                           Cardiovascular:  Exercise tolerance: good (>4 METS),   (+) hypertension: moderate, past MI:, CAD: obstructive,     (-) valvular problems/murmurs, CABG/stent, dysrhythmias,  angina,  SOUZA and no pulmonary hypertension      Rhythm: regular  Rate: normal                    Neuro/Psych:      (-) seizures, TIA and CVA            ROS comment: Spinal stenosis GI/Hepatic/Renal:   (+) liver disease:, bowel prep,      (-) no renal disease       Endo/Other:    (+) DiabetesType II DM, well controlled, , : arthritis:., .    (-) hypothyroidism               Abdominal:             Vascular:     - PVD, DVT and PE. Other Findings:             Anesthesia Plan      MAC     ASA 3     (Discussed risks and benefits to sedation including nausea, vomiting, allergic reaction, headache, delayed cognitive recovery, stroke, heart attack, respiratory depression, and death which patient understood and agreed to proceed. The patient was given the opportunity to ask questions and all questions were answered to the patient's satisfaction.  )  Induction: intravenous and rapid sequence. MIPS: Postoperative opioids intended, Prophylactic antiemetics administered and Postoperative trial extubation. Anesthetic plan and risks discussed with patient and child/children. Plan discussed with CRNA. Post-op pain plan if not by surgeon: single peripheral nerve block        This pre-anesthesia assessment may be used as a history and physical.    DOS STAFF ADDENDUM:    Pt seen and examined, chart reviewed (including anesthesia, drug and allergy history). No interval changes to history and physical examination. Anesthetic plan, risks, benefits, alternatives, and personnel involved discussed with patient.   Patient verbalized an understanding and agrees to proceed.       Carlos Magana MD  August 3, 2022  8:18 AM

## 2022-08-08 ENCOUNTER — HOSPITAL ENCOUNTER (OUTPATIENT)
Dept: ULTRASOUND IMAGING | Age: 72
Discharge: HOME | End: 2022-08-08
Payer: MEDICARE

## 2022-08-08 DIAGNOSIS — R14.0 ABDOMINAL DISTENSION: ICD-10-CM

## 2022-08-08 DIAGNOSIS — K74.60 CIRRHOSIS, NON-ALCOHOLIC (HCC): ICD-10-CM

## 2022-08-08 PROCEDURE — 76700 US EXAM ABDOM COMPLETE: CPT

## 2022-08-10 ENCOUNTER — TELEPHONE (OUTPATIENT)
Dept: INTERVENTIONAL RADIOLOGY/VASCULAR | Age: 72
End: 2022-08-10

## 2022-08-10 NOTE — TELEPHONE ENCOUNTER
Message left instructing on procedure, arrival time, meds with restrictions, and transportation. Office number left for pt to return call.

## 2022-08-11 ENCOUNTER — HOSPITAL ENCOUNTER (OUTPATIENT)
Dept: INTERVENTIONAL RADIOLOGY/VASCULAR | Age: 72
Discharge: HOME | End: 2022-08-11
Payer: MEDICARE

## 2022-08-11 VITALS
TEMPERATURE: 96.9 F | DIASTOLIC BLOOD PRESSURE: 68 MMHG | RESPIRATION RATE: 16 BRPM | OXYGEN SATURATION: 100 % | SYSTOLIC BLOOD PRESSURE: 136 MMHG | HEART RATE: 68 BPM

## 2022-08-11 DIAGNOSIS — R18.8 OTHER ASCITES: ICD-10-CM

## 2022-08-11 DIAGNOSIS — K74.60 NON-ALCOHOLIC CIRRHOSIS (HCC): ICD-10-CM

## 2022-08-11 LAB
ALBUMIN FLUID: 1 G/DL
APPEARANCE FLUID: CLEAR
CELL COUNT FLUID TYPE: NORMAL
CLOT EVALUATION: NORMAL
COLOR FLUID: YELLOW
FLUID TYPE: NORMAL
LYMPHOCYTES, BODY FLUID: 11 %
MACROPHAGE FLUID: 83 %
MESOTHELIAL FLUID: 3 %
NEUTROPHIL, FLUID: 3 %
NUCLEATED CELLS FLUID: 151 /CUMM
NUMBER OF CELLS COUNTED FLUID: 100
RBC FLUID: 1000 /CUMM

## 2022-08-11 PROCEDURE — 2500000003 HC RX 250 WO HCPCS: Performed by: RADIOLOGY

## 2022-08-11 PROCEDURE — 89051 BODY FLUID CELL COUNT: CPT

## 2022-08-11 PROCEDURE — 82042 OTHER SOURCE ALBUMIN QUAN EA: CPT

## 2022-08-11 PROCEDURE — 7100000010 HC PHASE II RECOVERY - FIRST 15 MIN

## 2022-08-11 PROCEDURE — 49083 ABD PARACENTESIS W/IMAGING: CPT

## 2022-08-11 PROCEDURE — 7100000011 HC PHASE II RECOVERY - ADDTL 15 MIN

## 2022-08-11 PROCEDURE — 88305 TISSUE EXAM BY PATHOLOGIST: CPT

## 2022-08-11 PROCEDURE — C1729 CATH, DRAINAGE: HCPCS

## 2022-08-11 PROCEDURE — 88112 CYTOPATH CELL ENHANCE TECH: CPT

## 2022-08-11 RX ORDER — LIDOCAINE HYDROCHLORIDE 10 MG/ML
20 INJECTION, SOLUTION EPIDURAL; INFILTRATION; INTRACAUDAL; PERINEURAL ONCE
Status: COMPLETED | OUTPATIENT
Start: 2022-08-11 | End: 2022-08-11

## 2022-08-11 RX ADMIN — LIDOCAINE HYDROCHLORIDE 8 ML: 10 INJECTION, SOLUTION EPIDURAL; INFILTRATION; INTRACAUDAL; PERINEURAL at 13:34

## 2022-08-11 NOTE — PROGRESS NOTES
Pt on unit from IR report received at bedside. Pt had 5000ml removed. VSS pt denies any pain family at bedside.

## 2022-08-11 NOTE — DISCHARGE INSTRUCTIONS
Resume diet, rest quietly for 24 hours, avoid straining , No lifting more than 10 pounds, or strenuous physical activity until next day. Do not restart aspirin or Vitamin E until next day. Keep bandaid on site clean and dry, may remove tomorrow. Follow up with ordering physician when to restart anticoagulants and/or with any problems.

## 2022-08-11 NOTE — PROGRESS NOTES
Patient VSS. IV removed. Site remains CDI. Patient tolerated po food and drink. D/c instruction read to patient and family. All questions answered. Patient walked to main lobby with wife by RN.

## 2022-08-12 ENCOUNTER — TELEPHONE (OUTPATIENT)
Dept: INTERVENTIONAL RADIOLOGY/VASCULAR | Age: 72
End: 2022-08-12

## 2022-09-06 ENCOUNTER — HOSPITAL ENCOUNTER (OUTPATIENT)
Dept: INTERVENTIONAL RADIOLOGY/VASCULAR | Age: 72
Discharge: HOME | End: 2022-09-06
Payer: MEDICARE

## 2022-09-06 VITALS
HEART RATE: 65 BPM | TEMPERATURE: 96.6 F | RESPIRATION RATE: 20 BRPM | OXYGEN SATURATION: 97 % | DIASTOLIC BLOOD PRESSURE: 70 MMHG | SYSTOLIC BLOOD PRESSURE: 115 MMHG

## 2022-09-06 DIAGNOSIS — R18.8 OTHER ASCITES: ICD-10-CM

## 2022-09-06 LAB
APTT: 36.7 SEC (ref 23–34.3)
INR BLD: 1.38 (ref 0.87–1.14)
PROTHROMBIN TIME: 16.9 SEC (ref 11.7–14.5)

## 2022-09-06 PROCEDURE — 85610 PROTHROMBIN TIME: CPT

## 2022-09-06 PROCEDURE — 7100000010 HC PHASE II RECOVERY - FIRST 15 MIN

## 2022-09-06 PROCEDURE — 49083 ABD PARACENTESIS W/IMAGING: CPT

## 2022-09-06 PROCEDURE — C1729 CATH, DRAINAGE: HCPCS

## 2022-09-06 PROCEDURE — 85730 THROMBOPLASTIN TIME PARTIAL: CPT

## 2022-09-06 PROCEDURE — 2500000003 HC RX 250 WO HCPCS: Performed by: STUDENT IN AN ORGANIZED HEALTH CARE EDUCATION/TRAINING PROGRAM

## 2022-09-06 PROCEDURE — 36415 COLL VENOUS BLD VENIPUNCTURE: CPT

## 2022-09-06 RX ORDER — LIDOCAINE HYDROCHLORIDE 10 MG/ML
10 INJECTION, SOLUTION EPIDURAL; INFILTRATION; INTRACAUDAL; PERINEURAL ONCE
Status: COMPLETED | OUTPATIENT
Start: 2022-09-06 | End: 2022-09-06

## 2022-09-06 RX ADMIN — LIDOCAINE HYDROCHLORIDE 10 ML: 10 INJECTION, SOLUTION EPIDURAL; INFILTRATION; INTRACAUDAL; PERINEURAL at 12:06

## 2022-09-06 ASSESSMENT — PAIN - FUNCTIONAL ASSESSMENT: PAIN_FUNCTIONAL_ASSESSMENT: NONE - DENIES PAIN

## 2022-09-06 NOTE — DISCHARGE INSTRUCTIONS
Paracentesis  9/6/2022  Resume diet, avoid straining, heavy lifting, or strenuous physical activity until next day. No lifting more than 10 pounds or strenuous activity for 24 hours  Keep bandage on site, clean, and dry; may remove tomorrow. If fluid seepage occurs from the puncture site, it is often helpful to apply pressure to the site or lie sideways on the side of the puncture (applies pressure as well). If seepage continues after 30 minutes, contact your prescribing provider, Dr. Ankita Nunez Follow up with ordering physician for when to restart special medications and/or with any problems. Follow up with Dr. Bishnu Noriega with any questions, concerns, or results of any testing done. ***  You may receive a phone call from a nurse or tech to check on you in the next couple of days. The interventional radiologist you saw today does not usually follow-up with you after your procedure. He/she does not change or prescribe medications or treatments. All questions after today should be answered by your prescribing physician, Dr. Ankita Nunez  ***  You may resume any vitamin E, fish oil, blood thinners or anticoagulants (Plavix, coumadin, aspirin, Eliquis, etc.) tomorrow unless otherwise instructed. The interventional radiologist you saw today is Dr. Tia Middleton, for your records. Today you had a paracentesis. We drained 4800 mL off your abdomen. Then we gave you ***g albumin to prevent a fluid shift in your body.

## 2022-09-06 NOTE — PROGRESS NOTES
4800ml of fluid removed  Spoke to patients GILA graham and advised her the amount of fluid removed and that patient was returning to the floor.

## 2022-09-07 ENCOUNTER — TELEPHONE (OUTPATIENT)
Dept: INTERVENTIONAL RADIOLOGY/VASCULAR | Age: 72
End: 2022-09-07

## 2022-09-22 ENCOUNTER — HOSPITAL ENCOUNTER (OUTPATIENT)
Dept: INTERVENTIONAL RADIOLOGY/VASCULAR | Age: 72
Discharge: HOME | End: 2022-09-22
Payer: MEDICARE

## 2022-09-22 VITALS
OXYGEN SATURATION: 100 % | SYSTOLIC BLOOD PRESSURE: 134 MMHG | DIASTOLIC BLOOD PRESSURE: 64 MMHG | RESPIRATION RATE: 16 BRPM | HEART RATE: 64 BPM | TEMPERATURE: 96.9 F

## 2022-09-22 DIAGNOSIS — R18.8 OTHER ASCITES: ICD-10-CM

## 2022-09-22 LAB
GLUCOSE BLD-MCNC: 77 MG/DL (ref 70–99)
PERFORMED ON: NORMAL

## 2022-09-22 PROCEDURE — C1729 CATH, DRAINAGE: HCPCS

## 2022-09-22 PROCEDURE — 2500000003 HC RX 250 WO HCPCS: Performed by: RADIOLOGY

## 2022-09-22 PROCEDURE — 49083 ABD PARACENTESIS W/IMAGING: CPT

## 2022-09-22 PROCEDURE — 7100000010 HC PHASE II RECOVERY - FIRST 15 MIN

## 2022-09-22 RX ORDER — LIDOCAINE HYDROCHLORIDE 10 MG/ML
10 INJECTION, SOLUTION EPIDURAL; INFILTRATION; INTRACAUDAL; PERINEURAL ONCE
Status: COMPLETED | OUTPATIENT
Start: 2022-09-22 | End: 2022-09-22

## 2022-09-22 RX ADMIN — LIDOCAINE HYDROCHLORIDE 10 ML: 10 INJECTION, SOLUTION EPIDURAL; INFILTRATION; INTRACAUDAL; PERINEURAL at 12:55

## 2022-09-22 NOTE — PROGRESS NOTES
Pt received from radiology post paracentesis. Pt has 3.1L removed no albumin ordered. No dressing noted on drainage pt has no complaints.

## 2022-09-22 NOTE — PROGRESS NOTES
Pt stated he did not receive albumin after last paracentesis. Pt requests IV to be inserted after the procedure if he needs to have an IV placed.

## 2022-09-22 NOTE — DISCHARGE INSTRUCTIONS
Resume diet, rest quietly for 24 hours, avoid straining , heavy lifting, or strenuous physical activity until next day. Do not restart aspirin or Vitamin E until next day. Keep bandaid on site clean and dry, may remove tomorrow. Follow up with ordering physician when to restart anticoagulants and/or with any problems.

## 2022-10-06 ENCOUNTER — TELEPHONE (OUTPATIENT)
Dept: INTERVENTIONAL RADIOLOGY/VASCULAR | Age: 72
End: 2022-10-06

## 2022-10-06 RX ORDER — METOPROLOL SUCCINATE 25 MG/1
25 TABLET, EXTENDED RELEASE ORAL DAILY
COMMUNITY

## 2022-10-06 RX ORDER — LISINOPRIL 10 MG/1
10 TABLET ORAL DAILY
COMMUNITY

## 2022-10-06 RX ORDER — VITAMIN B COMPLEX
TABLET ORAL
COMMUNITY

## 2022-10-06 RX ORDER — ZINC GLUCONATE 50 MG
50 TABLET ORAL DAILY
COMMUNITY

## 2022-10-06 RX ORDER — MULTIVIT WITH MINERALS/LUTEIN
250 TABLET ORAL DAILY
COMMUNITY

## 2022-10-06 RX ORDER — GINKGO BILOBA LEAF EXTRACT 60 MG
CAPSULE ORAL
COMMUNITY

## 2022-10-13 ENCOUNTER — HOSPITAL ENCOUNTER (OUTPATIENT)
Dept: INTERVENTIONAL RADIOLOGY/VASCULAR | Age: 72
Discharge: HOME | End: 2022-10-13
Payer: MEDICARE

## 2022-10-13 VITALS
SYSTOLIC BLOOD PRESSURE: 133 MMHG | HEART RATE: 65 BPM | OXYGEN SATURATION: 100 % | DIASTOLIC BLOOD PRESSURE: 61 MMHG | TEMPERATURE: 97.2 F | RESPIRATION RATE: 15 BRPM

## 2022-10-13 DIAGNOSIS — R18.8 OTHER ASCITES: ICD-10-CM

## 2022-10-13 LAB
APTT: 36.5 SEC (ref 23–34.3)
INR BLD: 1.29 (ref 0.87–1.14)
PLATELET # BLD: 94 K/UL (ref 135–450)
PROTHROMBIN TIME: 16.1 SEC (ref 11.7–14.5)

## 2022-10-13 PROCEDURE — 85049 AUTOMATED PLATELET COUNT: CPT

## 2022-10-13 PROCEDURE — 49083 ABD PARACENTESIS W/IMAGING: CPT

## 2022-10-13 PROCEDURE — 85730 THROMBOPLASTIN TIME PARTIAL: CPT

## 2022-10-13 PROCEDURE — 2500000003 HC RX 250 WO HCPCS: Performed by: STUDENT IN AN ORGANIZED HEALTH CARE EDUCATION/TRAINING PROGRAM

## 2022-10-13 PROCEDURE — 36415 COLL VENOUS BLD VENIPUNCTURE: CPT

## 2022-10-13 PROCEDURE — C1729 CATH, DRAINAGE: HCPCS

## 2022-10-13 PROCEDURE — 7100000010 HC PHASE II RECOVERY - FIRST 15 MIN

## 2022-10-13 PROCEDURE — 85610 PROTHROMBIN TIME: CPT

## 2022-10-13 RX ORDER — LIDOCAINE HYDROCHLORIDE 10 MG/ML
10 INJECTION, SOLUTION EPIDURAL; INFILTRATION; INTRACAUDAL; PERINEURAL ONCE
Status: COMPLETED | OUTPATIENT
Start: 2022-10-13 | End: 2022-10-13

## 2022-10-13 RX ADMIN — LIDOCAINE HYDROCHLORIDE 10 ML: 10 INJECTION, SOLUTION EPIDURAL; INFILTRATION; INTRACAUDAL; PERINEURAL at 14:13

## 2022-10-13 ASSESSMENT — PAIN - FUNCTIONAL ASSESSMENT: PAIN_FUNCTIONAL_ASSESSMENT: NONE - DENIES PAIN

## 2022-10-13 NOTE — PROGRESS NOTES
5600ml of fluid removed  Spoke to patients GILA gallardo and advised her the amount of fluid removed and that patient was returning to the floor.

## 2022-10-13 NOTE — PROGRESS NOTES
Pt received from radiology post paracentesis. Pt had 5.6L removed no albumin ordered. Dressing to abd is cdi. Will discharge home.

## 2022-11-03 ENCOUNTER — HOSPITAL ENCOUNTER (OUTPATIENT)
Dept: INTERVENTIONAL RADIOLOGY/VASCULAR | Age: 72
Discharge: HOME | End: 2022-11-03
Payer: MEDICARE

## 2022-11-03 VITALS
HEART RATE: 66 BPM | BODY MASS INDEX: 29.28 KG/M2 | OXYGEN SATURATION: 100 % | SYSTOLIC BLOOD PRESSURE: 138 MMHG | RESPIRATION RATE: 16 BRPM | TEMPERATURE: 98.2 F | WEIGHT: 228.01 LBS | DIASTOLIC BLOOD PRESSURE: 56 MMHG

## 2022-11-03 DIAGNOSIS — R18.8 OTHER ASCITES: ICD-10-CM

## 2022-11-03 PROCEDURE — C1729 CATH, DRAINAGE: HCPCS

## 2022-11-03 PROCEDURE — 7100000010 HC PHASE II RECOVERY - FIRST 15 MIN

## 2022-11-03 PROCEDURE — 49083 ABD PARACENTESIS W/IMAGING: CPT

## 2022-11-03 PROCEDURE — 2500000003 HC RX 250 WO HCPCS: Performed by: INTERNAL MEDICINE

## 2022-11-03 PROCEDURE — 7100000011 HC PHASE II RECOVERY - ADDTL 15 MIN

## 2022-11-03 RX ORDER — LIDOCAINE HYDROCHLORIDE 10 MG/ML
5 INJECTION, SOLUTION EPIDURAL; INFILTRATION; INTRACAUDAL; PERINEURAL ONCE
Status: COMPLETED | OUTPATIENT
Start: 2022-11-03 | End: 2022-11-03

## 2022-11-03 RX ADMIN — LIDOCAINE HYDROCHLORIDE 5 ML: 10 INJECTION, SOLUTION EPIDURAL; INFILTRATION; INTRACAUDAL; PERINEURAL at 13:58

## 2022-11-03 ASSESSMENT — PAIN SCALES - GENERAL: PAINLEVEL_OUTOF10: 0

## 2022-11-03 NOTE — PROGRESS NOTES
Discharge instructions reviewed and understanding verbalized per pt at bedside. Pt discharged via wheel chair in stable condition to wife to be transported home. Lower abdomen dressing remains CDI.

## 2022-11-03 NOTE — BRIEF OP NOTE
Brief Postoperative Note    Francesco Moseley  YOB: 1950  [id number]    Pre-operative Diagnosis: ascites    Post-operative Diagnosis: Same    Procedure: US-guided paracentesis    Anesthesia: Local    Surgeons: Surya Hale MD    Estimated Blood Loss: Less than 5 mL    Complications: None    Specimens: ascites drained    Findings: Successful US-guided paracentesis.     Electronically signed by Surya Hale MD on 11/3/2022 at 1:49 PM

## 2022-11-03 NOTE — DISCHARGE INSTRUCTIONS
Paracentesis  11/3/2022  Resume diet, avoid straining, heavy lifting, or strenuous physical activity until next day. No lifting more than 10 pounds or strenuous activity for 24 hours  Keep bandage on site, clean, and dry; may remove tomorrow. If fluid seepage occurs from the puncture site, it is often helpful to apply pressure to the site or lie sideways on the side of the puncture (applies pressure as well). If seepage continues after 30 minutes, contact your prescribing provider. Follow up with ordering physician for when to restart special medications and/or with any problems. Follow up with physician with any questions, concerns, or results of any testing done. You may receive a phone call from a nurse or tech to check on you in the next couple of days. The interventional radiologist you saw today does not usually follow-up with you after your procedure. He/she does not change or prescribe medications or treatments. All questions after today should be answered by your prescribing physician  You may resume any vitamin E, fish oil, blood thinners or anticoagulants (Plavix, coumadin, aspirin, Eliquis, etc.) tomorrow unless otherwise instructed. The interventional radiologist you saw today is Dr. Cheryl Gomez, for your records. Today you had a paracentesis. We drained 4800mL off your abdomen.

## 2022-11-03 NOTE — PROGRESS NOTES
Pt received in preop from home, pt awake alert and orietned, vss, resp even and easy. Wife at bedside.

## 2022-11-03 NOTE — PROGRESS NOTES
Pt arrived from IR, report from RN. Pt awake and alert, VSS. Abdomen soft and non distended. Lower abdominal dressing CDI, pt had 4800 ml fluid from abdomen. No order for Albumin.

## 2022-11-05 NOTE — PROGRESS NOTES
[facility]   Cardiac Follow up     Referring Provider:  Melissa Stockton     Chief Complaint   Patient presents with    Coronary Artery Disease     No cardiac complaints    Hypertension    Hyperlipidemia        History of Present Illness:  Francesco Moseley is a 74 y.o.male with a history of NSTEMI / CAD s/p PTCA RCA (12/2018), hypertension, and hyperlipidemia. His other history includes: JOSE LUIS, esophageal varices, mild portal hypertensive gastropathy / portal HTN with cirrhosis and ITP. Nuclear stress test in June was without ischemia. He reports at the time of his 2018 stenting he was having dyspnea. He has reported that his wife has lung cancer (she is nonsmoker). Aniyah Beltran did undergo a hernia repair (4/6/21) with Dr. Katie Knight without significant complication. He also continues to follow with GI and had EGD in September. He had an US guided paracentesis 11/3/22 due to ascites per MD Amarjit Hager is here for 6 mos follow up. Pt reports his wife's cancer is \"stable. \" On 12/5/22 he goes for a transplant assessment for liver. Aniyah Beltran reports that he has fluid accumulation in his abdomen and he gets paracentesis about every 3-4 weeks. He reports that they  typically drain about 4-5 L of fluid - first paracentesis was in September. Pt denies exertional chest pain, SOUZA/PND, palpitations, light-headedness, edema. Past Medical History:   has a past medical history of Arthritis, CAD (coronary artery disease), History of blood transfusion, Hyperlipidemia, Hypertension, MARIE (nonalcoholic steatohepatitis), Osteoarthritis, Spinal stenosis, Thrombocytopenia ([address] Type 2 diabetes mellitus without complication ([address] Type II or unspecified type diabetes mellitus without mention of complication, not stated as uncontrolled, and Wears glasses. Surgical History:   has a past surgical history that includes cyst removal (Right); Colonoscopy (12/04/2018);  Upper gastrointestinal endoscopy (12/04/2018); Upper gastrointestinal endoscopy (N/A, 12/4/2018); Colonoscopy (N/A, 12/4/2018); Coronary angioplasty with stent (12/04/2018); Upper gastrointestinal endoscopy (N/A, 3/6/2019); Upper gastrointestinal endoscopy (N/A, 4/3/2019); Upper gastrointestinal endoscopy (N/A, 6/5/2019); Inguinal hernia repair (Left, 12/11/15); Hand surgery (Right, 2013); ventral hernia repair (N/A, 11/18/2019); Upper gastrointestinal endoscopy (N/A, 12/18/2019); back surgery (11/25/2016); back surgery (2020); Upper gastrointestinal endoscopy (N/A, 9/2/2020); Upper gastrointestinal endoscopy (N/A, 3/3/2021); Upper gastrointestinal endoscopy (N/A, 9/1/2021); ventral hernia repair (N/A, 12/10/2021); Endoscopy, colon, diagnostic; Upper gastrointestinal endoscopy (N/A, 3/2/2022); hernia repair (Right, 4/6/2022); and Colonoscopy (N/A, 8/3/2022). Social History:   reports that he has never smoked. He has never used smokeless tobacco. He reports that he does not currently use alcohol. He reports that he does not use drugs. Family History:  family history includes Diabetes in his brother and father; Heart Disease in his father; Kidney Disease in his mother; No Known Problems in his brother. Home Medications:  Prior to Admission medications    Medication Sig Start Date End Date Taking?  Authorizing Provider   lansoprazole (PREVACID) 30 MG delayed release capsule Take 30 mg by mouth daily   Yes Historical Provider, MD   losartan (COZAAR) 100 MG tablet Take 100 mg by mouth daily   Yes Historical Provider, MD   spironolactone (ALDACTONE) 50 MG tablet Take 50 mg by mouth daily   Yes Historical Provider, MD   vitamin B-12 (CYANOCOBALAMIN) 1000 MCG tablet Take 1,000 mcg by mouth daily   Yes Historical Provider, MD   methocarbamol (ROBAXIN) 750 MG tablet Take 750 mg by mouth every 8 hours as needed 4/1/19  Yes Historical Provider, MD   aspirin 81 MG tablet Take 1 tablet by mouth daily 4/22/19  Yes Abimael Estevez MD   atorvastatin (LIPITOR) 40 MG tablet Take 1 tablet by mouth nightly 1/30/19  Yes PATRICK Leos - CNP   glipiZIDE (GLUCOTROL) 10 MG tablet Take 5 mg by mouth 2 times daily (before meals)   Yes Historical Provider, MD   metFORMIN (GLUCOPHAGE) 1000 MG tablet Take 1 tablet by mouth 2 times daily (with meals)  Patient taking differently: Take 500 mg by mouth 2 times daily (with meals) 12/7/18  Yes Anyi Stockton MD   nadolol (CORGARD) 80 MG tablet Take 1 tablet by mouth daily  Patient taking differently: Take 40 mg by mouth daily 40 mg daily 12/6/18  Yes Anyi Stockton MD   ferrous sulfate 325 (65 Fe) MG tablet Take 325 mg by mouth 2 times daily    Yes Historical Provider, MD   Multiple Vitamins-Minerals (MULTIVITAMIN PO) Take 1 tablet by mouth daily   Yes Historical Provider, MD   CINNAMON PO Take 1,000 mg by mouth 2 times daily   Yes Historical Provider, MD   glucose blood VI test strips (FREESTYLE LITE) strip Test blood sugar once daily. 10/6/14  Yes Mehran Hood MD   metoprolol succinate (TOPROL XL) 25 MG extended release tablet Take 25 mg by mouth daily    Historical Provider, MD   lisinopril (PRINIVIL;ZESTRIL) 10 MG tablet Take 10 mg by mouth daily    Historical Provider, MD   Ascorbic Acid (VITAMIN C) 250 MG tablet Take 250 mg by mouth daily    Historical Provider, MD   Coenzyme Q10 (COQ10) 100 MG CAPS Take by mouth    Historical Provider, MD   zinc gluconate 50 MG tablet Take 50 mg by mouth daily    Historical Provider, MD Bernal Cellar Tea 150 MG CAPS Take by mouth    Historical Provider, MD        Allergies:  Patient has no active allergies. Review of Systems:   Constitutional: there has been no unanticipated weight loss. There's been no change in energy level, sleep pattern, or activity level. Eyes: No visual changes or diplopia. No scleral icterus. ENT: No Headaches, hearing loss or vertigo. No mouth sores or sore throat. Cardiovascular: Reviewed in HPI  Respiratory: No cough or wheezing, no sputum production. No hematemesis. Gastrointestinal: No abdominal pain, appetite loss, blood in stools. No change in bowel or bladder habits. Genitourinary: No dysuria, trouble voiding, or hematuria. Musculoskeletal:  No gait disturbance, weakness or joint complaints. Integumentary: No rash or pruritis. Neurological: No headache, diplopia, change in muscle strength, numbness or tingling. No change in gait, balance, coordination, mood, affect, memory, mentation, behavior. Psychiatric: No anxiety, no depression. Endocrine: No malaise, fatigue or temperature intolerance. No excessive thirst, fluid intake, or urination. No tremor. Hematologic/Lymphatic: No abnormal bruising or bleeding, blood clots or swollen lymph nodes. Allergic/Immunologic: No nasal congestion or hives. Physical Examination:    Vitals:    11/17/22 0807   BP: 110/60   Pulse: 72   SpO2: 100%          Wt Readings from Last 1 Encounters:   11/17/22 220 lb (99.8 kg)       Constitutional and General Appearance: NAD  Skin:good turgor,intact without lesions  HEENT: EOMI ,normal  Neck:no JVD    Respiratory:  Normal excursion and expansion without use of accessory muscles  Resp Auscultation: Normal breath sounds without dullness  Cardiovascular: The apical impulses not displaced  Heart tones are crisp and normal  Cervical veins are not engorged  The carotid upstroke is normal in amplitude and contour without delay or bruit  Peripheral pulses are symmetrical and full  There is no clubbing, cyanosis of the extremities.   No edema  Femoral Arteries: 2+ and equal  Pedal Pulses: 2+ and equal   Abdomen:  No masses or tenderness  Liver/Spleen: No Abnormalities Noted  Neurological/Psychiatric:  Alert and oriented in all spheres  Moves all extremities well  Exhibits normal gait balance and coordination  No abnormalities of mood, affect, memory, mentation, or behavior are noted    Abd u/s 12/4/2018  Aorta is poorly visualized with normal caliber proximally and in the midportion. [facility] 12/5/18   Patient with markedly abnormal stress with inferior ischemia. Cath done with 75% prox RCA,85% distal RCA. Now post GI w/up with varices identified. GI service cleared him for dual antiplatelet therapy ,now post transfusion     PCI with VOLODYMYR x 2 in prox and distal RCA. He will need dual antiplatelet therapy, prefer for 6 months but at least 1 month    Stress echo 11/14/2019  -Normal stress echocardiogram study.   -Target heart rate not achieved. -Normal left ventricle size, wall thickness, and systolic function with an estimated ejection fraction of 55%. There is concentric left ventricular hypertrophy.   -Mild mitral regurgitation.   -Trivial aortic regurgitation.   -Mild tricuspid regurgitation with PASP of 30 mmHg. SPECT 6/18/21  Summary    Normal myocardial perfusion study. Normal LV size and systolic function. Assessment:    1. Coronary artery disease involving native coronary artery of native heart without angina pectoris  denies anginal symptoms   H(X) NSTEMI: metoprolol changed to nadolol (cirrhosis & varices)  Not on Plavix due to his bleeding issues  s/p PTCA prox & distal RCA Dec 2018  Stress echo 11/14/2019> Normal, EF 55%  SPECT 6/2021 without ischemia  Continue on ASA/statin    He reports dyspnea as his symptom at time of 2018 stenting. 2.Essential hypertension  /60 (Site: Right Upper Arm, Position: Sitting, Cuff Size: Medium Adult)   Pulse 72   Ht 6' 2\" (1.88 m)   Wt 220 lb (99.8 kg)   SpO2 100%   BMI 28.25 kg/m²   controlled  Continue current medication regimen.     3. Mixed hyperlipidemia    8/2021: , , HDL 32, LDL 62  AST 37 ALT 37  2/12/22  TG 93 HDL 37 LDL 54.  8/27/22  TG 77 HDL 28 LDL 60  Continue on Lipitor 40 mg.      4. DM  A1C 7.2 (8/2021)  A1C 5.4 (2/2022)  A1C 5.1 (8/2022)  Managed by PCP    Idiopathic liver cirrhosis with recurrent ascites- being considered for a liver transplant      Plan:    Cardiac test and lab results personally reviewed by me during this office visit and discussed. If proceed with liver transplant, must obtain cardiac clearance. Would need nuclear stress test  Continue risk factor modifications. Call for any change in symptoms, call to report any changes in shortness of breath or development of chest pain with activity. Follow up in 6 mos      I appreciate the opportunity of cooperating in the care of this individual.      Brayan Taylor. Melita Verdin M.D., [address] [facility]    Patient's problem list, medications, allergies, past medical, surgical, social and family histories were reviewed and updated as appropriate. Scribe's attestation: This note was scribed in the presence of Dr Melita Verdin by Autumn Osuna RN. The scribe's documentation has been prepared under my direction and personally reviewed by me in its entirety. I confirm that the note above accurately reflects all work, treatment, procedures, and medical decision making performed by me.

## 2022-11-17 ENCOUNTER — OFFICE VISIT (OUTPATIENT)
Dept: CARDIOLOGY CLINIC | Age: 72
End: 2022-11-17
Payer: MEDICARE

## 2022-11-17 VITALS
HEART RATE: 72 BPM | BODY MASS INDEX: 28.23 KG/M2 | DIASTOLIC BLOOD PRESSURE: 60 MMHG | OXYGEN SATURATION: 100 % | SYSTOLIC BLOOD PRESSURE: 110 MMHG | HEIGHT: 74 IN | WEIGHT: 220 LBS

## 2022-11-17 DIAGNOSIS — I25.10 CORONARY ARTERY DISEASE INVOLVING NATIVE CORONARY ARTERY OF NATIVE HEART WITHOUT ANGINA PECTORIS: Primary | ICD-10-CM

## 2022-11-17 DIAGNOSIS — E78.49 OTHER HYPERLIPIDEMIA: ICD-10-CM

## 2022-11-17 DIAGNOSIS — I10 PRIMARY HYPERTENSION: ICD-10-CM

## 2022-11-17 PROCEDURE — 1123F ACP DISCUSS/DSCN MKR DOCD: CPT | Performed by: INTERNAL MEDICINE

## 2022-11-17 PROCEDURE — G8484 FLU IMMUNIZE NO ADMIN: HCPCS | Performed by: INTERNAL MEDICINE

## 2022-11-17 PROCEDURE — G8427 DOCREV CUR MEDS BY ELIG CLIN: HCPCS | Performed by: INTERNAL MEDICINE

## 2022-11-17 PROCEDURE — 3078F DIAST BP <80 MM HG: CPT | Performed by: INTERNAL MEDICINE

## 2022-11-17 PROCEDURE — G8417 CALC BMI ABV UP PARAM F/U: HCPCS | Performed by: INTERNAL MEDICINE

## 2022-11-17 PROCEDURE — 99214 OFFICE O/P EST MOD 30 MIN: CPT | Performed by: INTERNAL MEDICINE

## 2022-11-17 PROCEDURE — 3017F COLORECTAL CA SCREEN DOC REV: CPT | Performed by: INTERNAL MEDICINE

## 2022-11-17 PROCEDURE — 3074F SYST BP LT 130 MM HG: CPT | Performed by: INTERNAL MEDICINE

## 2022-11-17 PROCEDURE — 1036F TOBACCO NON-USER: CPT | Performed by: INTERNAL MEDICINE

## 2022-11-17 NOTE — PATIENT INSTRUCTIONS
Continue risk factor modifications. Call for any change in symptoms, call to report any changes in shortness of breath or development of chest pain with activity.     Follow up in 6 mos alma rosa

## 2022-11-28 ENCOUNTER — HOSPITAL ENCOUNTER (OUTPATIENT)
Dept: INTERVENTIONAL RADIOLOGY/VASCULAR | Age: 72
Discharge: HOME | End: 2022-11-28
Payer: MEDICARE

## 2022-11-28 VITALS
SYSTOLIC BLOOD PRESSURE: 117 MMHG | OXYGEN SATURATION: 100 % | DIASTOLIC BLOOD PRESSURE: 54 MMHG | TEMPERATURE: 96.9 F | RESPIRATION RATE: 15 BRPM | HEART RATE: 68 BPM

## 2022-11-28 DIAGNOSIS — R18.8 OTHER ASCITES: ICD-10-CM

## 2022-11-28 LAB
GLUCOSE BLD-MCNC: 96 MG/DL (ref 70–99)
INR BLD: 1.41 (ref 0.87–1.14)
PERFORMED ON: NORMAL
PLATELET # BLD: 118 K/UL (ref 135–450)
PROTHROMBIN TIME: 17.2 SEC (ref 11.7–14.5)

## 2022-11-28 PROCEDURE — 36415 COLL VENOUS BLD VENIPUNCTURE: CPT

## 2022-11-28 PROCEDURE — 7100000010 HC PHASE II RECOVERY - FIRST 15 MIN

## 2022-11-28 PROCEDURE — 85049 AUTOMATED PLATELET COUNT: CPT

## 2022-11-28 PROCEDURE — 85610 PROTHROMBIN TIME: CPT

## 2022-11-28 PROCEDURE — C1729 CATH, DRAINAGE: HCPCS

## 2022-11-28 PROCEDURE — 49083 ABD PARACENTESIS W/IMAGING: CPT

## 2022-11-28 RX ORDER — FUROSEMIDE 20 MG/1
20 TABLET ORAL DAILY
COMMUNITY

## 2022-11-28 ASSESSMENT — PAIN SCALES - GENERAL: PAINLEVEL_OUTOF10: 0

## 2022-11-28 ASSESSMENT — PAIN - FUNCTIONAL ASSESSMENT: PAIN_FUNCTIONAL_ASSESSMENT: NONE - DENIES PAIN

## 2022-11-28 NOTE — PROGRESS NOTES
4600ml of fluid removed  Spoke to patients GILA graham and advised her the amount of fluid removed and that patient was returning to the floor.

## 2022-11-28 NOTE — PROGRESS NOTES
Pt received from radiology post paracentesis. Pt awake alert and oriented, vss, resp even and easy. Dressing to abd site is cdi.

## 2022-12-15 ENCOUNTER — HOSPITAL ENCOUNTER (OUTPATIENT)
Dept: INTERVENTIONAL RADIOLOGY/VASCULAR | Age: 72
Discharge: HOME | End: 2022-12-15
Payer: MEDICARE

## 2022-12-15 VITALS
WEIGHT: 218 LBS | OXYGEN SATURATION: 100 % | BODY MASS INDEX: 27.99 KG/M2 | TEMPERATURE: 97.6 F | RESPIRATION RATE: 15 BRPM | DIASTOLIC BLOOD PRESSURE: 46 MMHG | HEART RATE: 64 BPM | SYSTOLIC BLOOD PRESSURE: 137 MMHG

## 2022-12-15 DIAGNOSIS — R18.8 OTHER ASCITES: ICD-10-CM

## 2022-12-15 PROCEDURE — 49083 ABD PARACENTESIS W/IMAGING: CPT

## 2022-12-15 PROCEDURE — 2500000003 HC RX 250 WO HCPCS: Performed by: STUDENT IN AN ORGANIZED HEALTH CARE EDUCATION/TRAINING PROGRAM

## 2022-12-15 PROCEDURE — C1729 CATH, DRAINAGE: HCPCS

## 2022-12-15 PROCEDURE — 7100000010 HC PHASE II RECOVERY - FIRST 15 MIN

## 2022-12-15 RX ORDER — LIDOCAINE HYDROCHLORIDE 10 MG/ML
10 INJECTION, SOLUTION EPIDURAL; INFILTRATION; INTRACAUDAL; PERINEURAL ONCE
Status: COMPLETED | OUTPATIENT
Start: 2022-12-15 | End: 2022-12-15

## 2022-12-15 RX ADMIN — LIDOCAINE HYDROCHLORIDE 10 ML: 10 INJECTION, SOLUTION EPIDURAL; INFILTRATION; INTRACAUDAL; PERINEURAL at 13:46

## 2022-12-15 NOTE — BRIEF OP NOTE
Brief Postoperative Note    Bettina Simeon  YOB: 1950  [id number]    Pre-operative Diagnosis: Ascites    Post-operative Diagnosis: Same    Procedure: US Guided Paracentesis    Anesthesia: Local    Surgeons/Assistants: Angel Lugo MD    Estimated Blood Loss: less than 5 mL    Complications: None    Specimens: Was Obtained: serous Ascitic Fluid    Findings: Technically successful US guided paracentesis.     Electronically signed by Angel Lugo MD on 12/15/2022 at 1:21 PM

## 2022-12-15 NOTE — PROGRESS NOTES
3200ml of fluid removed  Spoke to patients GILA gallardo and advised her the amount of fluid removed and that patient was returning to the floor.

## 2022-12-15 NOTE — PROGRESS NOTES
Pt returned from paracentesis, pt had 3.2L removed. Dressing to right abd is cdi. Vss. Will discharge home.

## 2022-12-27 RX ORDER — LEVOTHYROXINE SODIUM 0.05 MG/1
50 TABLET ORAL DAILY
COMMUNITY

## 2022-12-27 NOTE — PROGRESS NOTES
Covid testing to be done: If positive---Pt instructed to notify MD ASAP  If negative--pt was instructed to bring Hard copy of Covid results DOP/DOS    Preoperative Screening for Elective Surgery/Invasive Procedures While COVID-19 present in the community     Have you tested positive or have been told to self-isolate for COVID-19 like symptoms within the past 28 days? NO  Do you currently have any of the following symptoms? Fever >100.0 F or 99.9 F in immunocompromised patients? NO  New onset cough, shortness of breath or difficulty breathing? NO  New onset sore throat, myalgia (muscle aches and pains), headache, loss of taste/smell or diarrhea? NO  Have you had a potential exposure to COVID-19 within the past 14 days by:  Close contact with a confirmed case? NO  Close contact with a healthcare worker,  or essential infrastructure worker (grocery store, [employer], gas station, public utilities or transportation)? YES  Do you reside in a congregate setting such as; skilled nursing facility, adult home, correctional facility, homeless shelter or other institutional setting? NO  Have you had recent travel to a known COVID-19 hotspot? NO     Indicate if the patient has a positive screen by answering yes to one or more of the above questions. If patient is unable to obtain a COVID test prior to DOS/DOP will need RAPID DOS. C-Difficile admission screening and protocol:       * Admitted with diarrhea? [] YES    [x]  NO     *Prior history of C-Diff. In last 3 months? [] YES    [x]  NO     *Antibiotic use in the past 6-8 weeks? [x]  NO    []  YES      If yes, which: REASON_________________     *Prior hospitalization or nursing home in the last month? []  YES    [x]  NO     SAFETY FIRST. .call before you fall    [address] [facility] time_0700___________        Surgery time__0830__________    Do not eat or drink anything after 12:00 midnight prior to your surgery. This includes water chewing gum, mints and ice chips- the Day of Surgery. You may brush your teeth and gargle the morning of your surgery, but do not swallow the water  Follow your MD/Surgeons pre-procedure instructions regarding your medications      Please see your family doctor/pediatrician for a history and physical and/or questions concerning medications. Bring any test results/reports from your physicians office. If you are under the care of a heart doctor or specialist doctor, please be aware that you may be asked to them for clearance    You may be asked to stop blood thinners such as Coumadin, Plavix, Fragmin, Lovenox, etc., or any anti-inflammatories such as:  Aspirin, Ibuprofen, Advil, Naproxen prior to your surgery. We also ask that you stop any OTC medications such as fish oil, vitamin E, glucosamine, garlic, Multivitamins, COQ 10, etc.    We ask that you do not smoke 24 hours prior to surgery  We ask that you do not  drink any alcoholic beverages 24 hours prior to surgery     You must make arrangements for a responsible adult to take you home after your surgery. For your safety you will not be allowed to leave alone or drive yourself home. Your surgery will be cancelled if you do not have a ride home. Also for your safety, it is strongly suggested that someone stay with you the first 24 hours after your surgery. A parent or legal guardian must accompany a child scheduled for surgery and plan to stay at the hospital until the child is discharged. Please do not bring other children with you. For your comfort, please wear simple loose fitting clothing to the hospital.  Please do not bring valuables. Do not wear any make-up or nail polish on your fingers or toes. For your safety, please do not wear any jewelry or body piercing's on the day of surgery. All jewelry must be removed.      If you have dentures, they will be removed before going to operating room.    For your convenience, we will provide you with a container. If you wear contact lenses or glasses, they will be removed, please bring a case for them. If you have a living will and a durable power of  for healthcare, please bring in a copy. As part of our patient safety program to minimize surgical site infections, we ask you to do the following:    Please notify your surgeon if you develop any illness between         now and the day of your surgery. This includes a cough, cold, fever, sore throat, nausea,         or vomiting, and diarrhea, etc.   Please notify your surgeon if you experience dizziness, shortness         of breath or blurred vision between now and the time of your surgery. Do not shave your operative site 96 hours prior to surgery. For face and neck surgery, men may use an electric razor 48 hours   prior to surgery. You may shower the night before surgery or the morning of   your surgery with an antibacterial soap. You will need to bring a photo ID and insurance card     If you use a C-pap or Bi-pap machine, please bring your machine with you to the hospital     Our goal is to provide you with excellent care, therefore, visitors will be limited to so that we may focus on providing this care for you. Please contact your surgeon office, if you have any further questions. [facility] phone number:  [address] PAT fax number:  [phone]    Please note these are generalized instructions for all surgical cases, you may be provided with more specific instructions according to your surgery.

## 2023-01-03 ENCOUNTER — ANESTHESIA EVENT (OUTPATIENT)
Dept: ENDOSCOPY | Age: 73
End: 2023-01-03
Payer: MEDICARE

## 2023-01-04 ENCOUNTER — ANESTHESIA (OUTPATIENT)
Dept: ENDOSCOPY | Age: 73
End: 2023-01-04
Payer: MEDICARE

## 2023-01-04 ENCOUNTER — HOSPITAL ENCOUNTER (OUTPATIENT)
Age: 73
Setting detail: OUTPATIENT SURGERY
Discharge: HOME | End: 2023-01-04
Attending: INTERNAL MEDICINE | Admitting: INTERNAL MEDICINE
Payer: MEDICARE

## 2023-01-04 VITALS
HEIGHT: 74 IN | DIASTOLIC BLOOD PRESSURE: 75 MMHG | SYSTOLIC BLOOD PRESSURE: 122 MMHG | OXYGEN SATURATION: 100 % | RESPIRATION RATE: 18 BRPM | HEART RATE: 57 BPM | TEMPERATURE: 96.7 F | BODY MASS INDEX: 26.99 KG/M2 | WEIGHT: 210.32 LBS

## 2023-01-04 LAB
GLUCOSE BLD-MCNC: 140 MG/DL (ref 70–99)
GLUCOSE BLD-MCNC: 152 MG/DL (ref 70–99)
PERFORMED ON: ABNORMAL
PERFORMED ON: ABNORMAL

## 2023-01-04 PROCEDURE — 7100000011 HC PHASE II RECOVERY - ADDTL 15 MIN: Performed by: INTERNAL MEDICINE

## 2023-01-04 PROCEDURE — 3609017100 HC EGD: Performed by: INTERNAL MEDICINE

## 2023-01-04 PROCEDURE — 6360000002 HC RX W HCPCS: Performed by: NURSE ANESTHETIST, CERTIFIED REGISTERED

## 2023-01-04 PROCEDURE — 2580000003 HC RX 258: Performed by: NURSE ANESTHETIST, CERTIFIED REGISTERED

## 2023-01-04 PROCEDURE — 2709999900 HC NON-CHARGEABLE SUPPLY: Performed by: INTERNAL MEDICINE

## 2023-01-04 PROCEDURE — 7100000001 HC PACU RECOVERY - ADDTL 15 MIN: Performed by: INTERNAL MEDICINE

## 2023-01-04 PROCEDURE — 2500000003 HC RX 250 WO HCPCS: Performed by: NURSE ANESTHETIST, CERTIFIED REGISTERED

## 2023-01-04 PROCEDURE — 3700000000 HC ANESTHESIA ATTENDED CARE: Performed by: INTERNAL MEDICINE

## 2023-01-04 PROCEDURE — 7100000000 HC PACU RECOVERY - FIRST 15 MIN: Performed by: INTERNAL MEDICINE

## 2023-01-04 PROCEDURE — 7100000010 HC PHASE II RECOVERY - FIRST 15 MIN: Performed by: INTERNAL MEDICINE

## 2023-01-04 RX ORDER — SODIUM CHLORIDE 9 MG/ML
INJECTION, SOLUTION INTRAVENOUS CONTINUOUS PRN
Status: DISCONTINUED | OUTPATIENT
Start: 2023-01-04 | End: 2023-01-04 | Stop reason: SDUPTHER

## 2023-01-04 RX ORDER — LIDOCAINE HYDROCHLORIDE 20 MG/ML
INJECTION, SOLUTION EPIDURAL; INFILTRATION; INTRACAUDAL; PERINEURAL PRN
Status: DISCONTINUED | OUTPATIENT
Start: 2023-01-04 | End: 2023-01-04 | Stop reason: SDUPTHER

## 2023-01-04 RX ORDER — ONDANSETRON 2 MG/ML
4 INJECTION INTRAMUSCULAR; INTRAVENOUS
Status: DISCONTINUED | OUTPATIENT
Start: 2023-01-04 | End: 2023-01-04 | Stop reason: HOSPADM

## 2023-01-04 RX ORDER — SODIUM CHLORIDE 0.9 % (FLUSH) 0.9 %
5-40 SYRINGE (ML) INJECTION EVERY 12 HOURS SCHEDULED
Status: DISCONTINUED | OUTPATIENT
Start: 2023-01-04 | End: 2023-01-04 | Stop reason: HOSPADM

## 2023-01-04 RX ORDER — SODIUM CHLORIDE 9 MG/ML
INJECTION, SOLUTION INTRAVENOUS PRN
Status: DISCONTINUED | OUTPATIENT
Start: 2023-01-04 | End: 2023-01-04 | Stop reason: HOSPADM

## 2023-01-04 RX ORDER — SODIUM CHLORIDE 0.9 % (FLUSH) 0.9 %
5-40 SYRINGE (ML) INJECTION PRN
Status: DISCONTINUED | OUTPATIENT
Start: 2023-01-04 | End: 2023-01-04 | Stop reason: HOSPADM

## 2023-01-04 RX ORDER — PROPOFOL 10 MG/ML
INJECTION, EMULSION INTRAVENOUS CONTINUOUS PRN
Status: DISCONTINUED | OUTPATIENT
Start: 2023-01-04 | End: 2023-01-04 | Stop reason: SDUPTHER

## 2023-01-04 RX ORDER — PROPOFOL 10 MG/ML
INJECTION, EMULSION INTRAVENOUS PRN
Status: DISCONTINUED | OUTPATIENT
Start: 2023-01-04 | End: 2023-01-04 | Stop reason: SDUPTHER

## 2023-01-04 RX ADMIN — PROPOFOL 180 MCG/KG/MIN: 10 INJECTION, EMULSION INTRAVENOUS at 08:35

## 2023-01-04 RX ADMIN — LIDOCAINE HYDROCHLORIDE 60 MG: 20 INJECTION, SOLUTION EPIDURAL; INFILTRATION; INTRACAUDAL; PERINEURAL at 08:35

## 2023-01-04 RX ADMIN — SODIUM CHLORIDE: 9 INJECTION, SOLUTION INTRAVENOUS at 08:29

## 2023-01-04 RX ADMIN — PROPOFOL 80 MG: 10 INJECTION, EMULSION INTRAVENOUS at 08:35

## 2023-01-04 ASSESSMENT — ENCOUNTER SYMPTOMS: SHORTNESS OF BREATH: 0

## 2023-01-04 ASSESSMENT — PAIN - FUNCTIONAL ASSESSMENT: PAIN_FUNCTIONAL_ASSESSMENT: 0-10

## 2023-01-04 ASSESSMENT — PAIN SCALES - GENERAL: PAINLEVEL_OUTOF10: 0

## 2023-01-04 NOTE — PROGRESS NOTES
Tolerating oral intake and being up in chair. Discharge instructions given to patient and wife. Verbalize understanding. No complaints.

## 2023-01-04 NOTE — BRIEF OP NOTE
Brief Postoperative Note    Reji Rebolledo  YOB: 1950  [id number]    Pre-operative Diagnosis: Cirrhosis; variceal screening    Post-operative Diagnosis: Same    Procedure: EGD    Anesthesia: MAC    Surgeons/Assistants: Sushma Green MD    Estimated Blood Loss: None    Complications: None    Specimens: Was Not Obtained    Findings: See dictated report    Electronically signed by Sushma Green MD on 1/4/2023 at 8:40 AM

## 2023-01-04 NOTE — H&P
Page GI   Pre-operative History and Physical    Patient: Phillip Perez  : 1950  Acct#:         HISTORY OF PRESENT ILLNESS:    The patient is a 74 y.o. male  who presents with cirrhosis; variceal screening  Past Medical History:        Diagnosis Date    Arthritis     CAD (coronary artery disease)     History of blood transfusion     Hyperlipidemia     Hypertension     MARIE (nonalcoholic steatohepatitis)     Osteoarthritis     Spinal stenosis     Thrombocytopenia (HCC)     Type 2 diabetes mellitus without complication ([facility] [address]     Type II or unspecified type diabetes mellitus without mention of complication, not stated as uncontrolled     type 2    Wears glasses      Past Surgical History:        Procedure Laterality Date    BACK SURGERY  2016    L3, L4,L5 remove spurs    BACK SURGERY      L2, L3 released pressure from nerve    COLONOSCOPY  2018    wiht polypectomy x3    COLONOSCOPY N/A 2018    COLONOSCOPY POLYPECTOMY SNARE/COLD BIOPSY performed by Linda Brown MD at [facility] N/A 8/3/2022    COLONOSCOPY performed by Luis Enrique Landa MD at [address]  12/04/2018    x2 stents-70% and 85% blocked    CYST REMOVAL Right     on arm    ENDOSCOPY, COLON, DIAGNOSTIC      HAND SURGERY Right     dupuytren syndrome    HERNIA REPAIR Right 2022    LAPAROSCOPIC RIGHT INGUINAL HERNIA REPAIR WITH MESH performed by Burt Reed MD at [address] [facility] Left 12/11/15    laparoscopic    UPPER GASTROINTESTINAL ENDOSCOPY  2018    with biopsy    UPPER GASTROINTESTINAL ENDOSCOPY N/A 2018    EGD BIOPSY performed by Linda Brown MD at [address] [facility] N/A 3/6/2019    EGD BAND LIGATION performed by Luis Enrique Landa MD at [address] [place] [address] 4/3/2019    ESOPHAGOGASTRODUODENOSCOPY WITH BANDING performed by Maci Hampton Rafaela Ball MD at [address] [facility] [address] 6/5/2019    ESOPHAGOGASTRODUODENOSCOPY performed by Denton Owens MD at [address] 12/18/2019    EGD ESOPHAGOGASTRODUODENOSCOPY performed by Denton Owens MD at [address] 9/2/2020    EGD BIOPSY performed by Denton Owens MD at [address] [facility] [address] 3/3/2021    ESOPHAGOGASTRODUODENOSCOPY performed by Denton Owens MD at [address] [facility] [address] 9/1/2021    ESOPHAGOGASTRODUODENOSCOPY performed by Denton Owens MD at [address] N/A 3/2/2022    ESOPHAGOGASTRODUODENOSCOPY performed by Denton Owens MD at [address] N/A 11/18/2019    OPEN REPAIR OF INCARCERATED VENTRAL HERNIA WITH MESH performed by Kourtney Chung MD at [facility] N/A 12/10/2021    LAPAROSCOPIC RECURRENT [address] performed by Kourtney Chung MD at [address] [facility]     Medications prior to admission:   Prior to Admission medications    Medication Sig Start Date End Date Taking?  Authorizing Provider   levothyroxine (SYNTHROID) 50 MCG tablet Take 50 mcg by mouth Daily   Yes Historical Provider, MD   furosemide (LASIX) 20 MG tablet Take 20 mg by mouth daily    Historical Provider, MD   metoprolol succinate (TOPROL XL) 25 MG extended release tablet Take 25 mg by mouth daily    Historical Provider, MD   lisinopril (PRINIVIL;ZESTRIL) 10 MG tablet Take 10 mg by mouth daily    Historical Provider, MD   Ascorbic Acid (VITAMIN C) 250 MG tablet Take 250 mg by mouth daily    Historical Provider, MD   Coenzyme Q10 (COQ10) 100 MG CAPS Take by mouth    Historical Provider, MD   zinc gluconate 50 MG tablet Take 50 mg by mouth daily    Historical Provider, MD Everett Line Tea 150 MG CAPS Take by mouth    Historical Provider, MD   lansoprazole (PREVACID) 30 MG delayed release capsule Take 30 mg by mouth daily    Historical Provider, MD   losartan (COZAAR) 100 MG tablet Take 100 mg by mouth every evening    Historical Provider, MD   spironolactone (ALDACTONE) 50 MG tablet Take 50 mg by mouth daily    Historical Provider, MD   vitamin B-12 (CYANOCOBALAMIN) 1000 MCG tablet Take 1,000 mcg by mouth daily    Historical Provider, MD   methocarbamol (ROBAXIN) 750 MG tablet Take 750 mg by mouth every 8 hours as needed 4/1/19   Historical Provider, MD   aspirin 81 MG tablet Take 1 tablet by mouth daily 4/22/19   Scarlet Marquis MD   atorvastatin (LIPITOR) 40 MG tablet Take 1 tablet by mouth nightly 1/30/19   PATRICK Collier - CNP   glipiZIDE (GLUCOTROL) 10 MG tablet Take 5 mg by mouth 2 times daily (before meals)    Historical Provider, MD   metFORMIN (GLUCOPHAGE) 1000 MG tablet Take 1 tablet by mouth 2 times daily (with meals)  Patient taking differently: Take 500 mg by mouth 2 times daily (with meals) 12/7/18   Christopher Garzon MD   nadolol (CORGARD) 80 MG tablet Take 1 tablet by mouth daily  Patient taking differently: Take 40 mg by mouth daily 40 mg daily 12/6/18   Christopher Garzon MD   ferrous sulfate 325 (65 Fe) MG tablet Take 325 mg by mouth 2 times daily     Historical Provider, MD   Multiple Vitamins-Minerals (MULTIVITAMIN PO) Take 1 tablet by mouth daily    Historical Provider, MD   CINNAMON PO Take 1,000 mg by mouth 2 times daily    Historical Provider, MD   glucose blood VI test strips (FREESTYLE LITE) strip Test blood sugar once daily. 10/6/14   Justin Huston MD     Allergies:    Patient has no known allergies.   Social History:   Social History     Socioeconomic History    Marital status:      Spouse name: Rosa Manjarrez    Number of children: Not on file    Years of education: 16    Highest education level: Not on file   Occupational History    Not on file   Tobacco Use    Smoking status: Never    Smokeless tobacco: Never   Vaping Use    Vaping Use: Never used   Substance and Sexual Activity    Alcohol use: Not Currently     Alcohol/week: 0.0 standard drinks     Comment: quit drinking 1990's    Drug use: Never    Sexual activity: Yes     Partners: Female   Other Topics Concern    Not on file   Social History Narrative    Not on file     Social Determinants of Health     Financial Resource Strain: Not on file   Food Insecurity: Not on file   Transportation Needs: Not on file   Physical Activity: Not on file   Stress: Not on file   Social Connections: Not on file   Intimate Partner Violence: Not on file   Housing Stability: Not on file           Family History:   Family History   Problem Relation Age of Onset    Kidney Disease Mother     Heart Disease Father     Diabetes Father     No Known Problems Brother     Diabetes Brother          PHYSICAL EXAM:      /69   Pulse 65   Temp 97.6 °F (36.4 °C) (Oral)   Resp 14   Ht 6' 2\" (1.88 m)   Wt 210 lb 5.1 oz (95.4 kg)   SpO2 100%   BMI 27.00 kg/m²  I        Heart: Normal    Lungs: Normal    Abdomen: Normal      ASA Grade: ASA 3 - Patient with moderate systemic disease with functional limitations    II (soft palate, uvula, fauces visible)  ASSESSMENT AND PLAN:    1. Patient is a 74 y.o. male here for EGD  2. Procedure options, risks and benefits reviewed with patient who expresses understanding.

## 2023-01-04 NOTE — DISCHARGE INSTRUCTIONS
[facility] Endoscopy Discharge Instructions   EGD (Esophagogastroduodenoscopy)    NAME:  Erica Brasher  YOB: 1950  MEDICAL RECORD NUMBER:  [id number]  DATE:  1/4/2023      After receiving Propofol (Diprivan) for Moderate Sedation:    Do not drive or operate any machinery until tomorrow  Do not sign any legal documents or make any critical decisions  Do not drink alcoholic beverages for 24 hours  Plan to spend a few hours resting before resuming your normal routine  Possible side effects are light headedness and sedation    You may resume your usual diet at home    Resume all your daily medications    Call your physician if any of the following occur: If you have any difficulty breathing: CALL [phone]  Neck swelling  Difficulty swallowing  Excessive pain or abdominal distention  Fever, chills, nausea or vomiting    If you are unable to reach your physician or symptoms worsen, proceed to the nearest Emergency Room    You may use warm salt water gargles, lozenges or Chloraseptic spray as needed for your sore throat. Biopsy Obtained: NO    Recommendations:  Repeat EGD in 6 months    For questions or concerns please contact your GI physician's 24 hour call center at [phone].

## 2023-01-04 NOTE — ANESTHESIA POSTPROCEDURE EVALUATION
Department of Anesthesiology  Postprocedure Note    Patient: Tennille Buck  MRN: [id number]  YOB: 1950  Date of evaluation: 1/4/2023      Procedure Summary     Date: 01/04/23 Room / Location: [address] [place] [address]    Anesthesia Start: 0830 Anesthesia Stop: 0654    Procedure: ESOPHAGOGASTRODUODENOSCOPY Diagnosis:       Non-alcoholic cirrhosis ([facility] [address]      (NON ALCOHOLIC CIRRHOSIS)    Surgeons: Toi Willard MD Responsible Provider: Shahida Morales MD    Anesthesia Type: MAC ASA Status: 3          Anesthesia Type: MAC    Queenie Phase I: Queenie Score: 10    Queenie Phase II: Queenie Score: 10      Anesthesia Post Evaluation    Patient location during evaluation: PACU  Patient participation: complete - patient participated  Level of consciousness: awake  Airway patency: patent  Nausea & Vomiting: no nausea and no vomiting  Complications: no  Cardiovascular status: hemodynamically stable and blood pressure returned to baseline  Respiratory status: spontaneous ventilation, nonlabored ventilation and room air  Hydration status: stable  Comments: Mr. Freddy Kapoor was seen resting comfortably following procedure. Appropriate for discharge home with .

## 2023-01-04 NOTE — OP NOTE
Operative Note      Patient: Phillip Perez  YOB: 1950  MRN: [id number]    Date of Procedure: 1/4/2023    Pre-Op Diagnosis: NON ALCOHOLIC CIRRHOSIS    Post-Op Diagnosis: Same       Procedure(s):  ESOPHAGOGASTRODUODENOSCOPY    Surgeon(s):  Luis Enrique Landa MD    Assistant:   * No surgical staff found *    Anesthesia: Monitor Anesthesia Care    Estimated Blood Loss (mL): None    Complications: None    Specimens:   * No specimens in log *    Implants:  * No implants in log *      Drains: * No LDAs found *    Findings: See dictated report    Detailed Description of Procedure:   See dictated report    Electronically signed by Luis Enrique Landa MD on 1/4/2023 at 8:40 AM

## 2023-01-04 NOTE — PROGRESS NOTES
Pt to endo from pacu. Pt a/o x4, but groggy. Denies pain. Vss. On monitor. No signs of distress noted at this time.  Report obtained

## 2023-01-04 NOTE — ANESTHESIA PRE PROCEDURE
Pre-Anesthesia Evaluation/Consultation       Name:  Charlene Mcdaniels  : 1950  Age:  74 y.o.                                            MRN:  [id number]  Date: 2023           Surgeon: Surgeon(s):  Rosey Ruffin MD    Procedure: Procedure(s):  ESOPHAGOGASTRODUODENOSCOPY     No Known Allergies  Patient Active Problem List   Diagnosis    Hypertension    Cyst of joint of hand    High triglycerides    Dupuytren's contracture of right hand    Rosacea    Thrombocytopenia (HCC)    Chronic ITP (idiopathic thrombocytopenia) (HCC)    Iron deficiency anemia    Anemia, iron deficiency    Chest pain    Non-STEMI (non-ST elevated myocardial infarction) ([facility]    CAD (coronary artery disease)    Hyperlipidemia    Incarcerated right inguinal hernia    Recurrent incisional hernia with incarceration    Type 2 diabetes mellitus     Past Medical History:   Diagnosis Date    Arthritis     CAD (coronary artery disease)     History of blood transfusion     Hyperlipidemia     Hypertension     MARIE (nonalcoholic steatohepatitis)     Osteoarthritis     Spinal stenosis     Thrombocytopenia (HCC)     Type 2 diabetes mellitus without complication (HCC)     Type II or unspecified type diabetes mellitus without mention of complication, not stated as uncontrolled     type 2    Wears glasses      Past Surgical History:   Procedure Laterality Date    BACK SURGERY  2016    L3, L4,L5 remove spurs    BACK SURGERY      L2, L3 released pressure from nerve    COLONOSCOPY  2018    wiht polypectomy x3    COLONOSCOPY N/A 2018    COLONOSCOPY POLYPECTOMY SNARE/COLD BIOPSY performed by Jenn Kuo MD at [address] [facility] N/A 8/3/2022    COLONOSCOPY performed by Rosey Ruffin MD at [facility]  12/04/2018    x2 stents-70% and 85% blocked    CYST REMOVAL Right     on arm    ENDOSCOPY, COLON, DIAGNOSTIC      HAND SURGERY Right 2013    dupuytren syndrome    HERNIA REPAIR Right 4/6/2022    LAPAROSCOPIC RIGHT INGUINAL HERNIA REPAIR WITH MESH performed by Rocael Patel MD at [address] [facility] Left 12/11/15    laparoscopic    UPPER GASTROINTESTINAL ENDOSCOPY  12/04/2018    with biopsy    UPPER GASTROINTESTINAL ENDOSCOPY N/A 12/4/2018    EGD BIOPSY performed by Rigo Stanley MD at [facility] N/A 3/6/2019    EGD BAND LIGATION performed by Fredy Enriquez MD at [facility] N/A 4/3/2019    ESOPHAGOGASTRODUODENOSCOPY WITH BANDING performed by Fredy Enriquez MD at [address] [place] [address] 6/5/2019    ESOPHAGOGASTRODUODENOSCOPY performed by Fredy Enriquez MD at [address] [place] [address] 12/18/2019    EGD ESOPHAGOGASTRODUODENOSCOPY performed by Fredy Enriquez MD at [address] [place] [address] 9/2/2020    EGD BIOPSY performed by Fredy Enriquez MD at [address] [place] [address] 3/3/2021    ESOPHAGOGASTRODUODENOSCOPY performed by Fredy Enriquez MD at [address] [place] [address] 9/1/2021    ESOPHAGOGASTRODUODENOSCOPY performed by Fredy Enriquez MD at [facility] N/A 3/2/2022    ESOPHAGOGASTRODUODENOSCOPY performed by Fredy Enriquez MD at [facility] N/A 11/18/2019    OPEN REPAIR OF INCARCERATED VENTRAL HERNIA WITH MESH performed by Rocael Patel MD at [address] [place] [address] N/A 12/10/2021    LAPAROSCOPIC RECURRENT [address] performed by Rocael Patel MD at [facility] History     Tobacco Use    Smoking status: Never    Smokeless tobacco: Never   Vaping Use    Vaping Use: Never used   Substance Use Topics    Alcohol use: Not Currently Alcohol/week: 0.0 standard drinks     Comment: quit drinking 1990's    Drug use: Never     Medications  No current facility-administered medications on file prior to encounter.      Current Outpatient Medications on File Prior to Encounter   Medication Sig Dispense Refill    levothyroxine (SYNTHROID) 50 MCG tablet Take 50 mcg by mouth Daily      furosemide (LASIX) 20 MG tablet Take 20 mg by mouth daily      metoprolol succinate (TOPROL XL) 25 MG extended release tablet Take 25 mg by mouth daily      lisinopril (PRINIVIL;ZESTRIL) 10 MG tablet Take 10 mg by mouth daily      Ascorbic Acid (VITAMIN C) 250 MG tablet Take 250 mg by mouth daily      Coenzyme Q10 (COQ10) 100 MG CAPS Take by mouth      zinc gluconate 50 MG tablet Take 50 mg by mouth daily      Green Tea 150 MG CAPS Take by mouth      lansoprazole (PREVACID) 30 MG delayed release capsule Take 30 mg by mouth daily      losartan (COZAAR) 100 MG tablet Take 100 mg by mouth every evening      spironolactone (ALDACTONE) 50 MG tablet Take 50 mg by mouth daily      vitamin B-12 (CYANOCOBALAMIN) 1000 MCG tablet Take 1,000 mcg by mouth daily      methocarbamol (ROBAXIN) 750 MG tablet Take 750 mg by mouth every 8 hours as needed      aspirin 81 MG tablet Take 1 tablet by mouth daily 90 tablet 3    atorvastatin (LIPITOR) 40 MG tablet Take 1 tablet by mouth nightly 90 tablet 3    glipiZIDE (GLUCOTROL) 10 MG tablet Take 5 mg by mouth 2 times daily (before meals)      metFORMIN (GLUCOPHAGE) 1000 MG tablet Take 1 tablet by mouth 2 times daily (with meals) (Patient taking differently: Take 500 mg by mouth 2 times daily (with meals)) 180 tablet 1    nadolol (CORGARD) 80 MG tablet Take 1 tablet by mouth daily (Patient taking differently: Take 40 mg by mouth daily 40 mg daily) 30 tablet 1    ferrous sulfate 325 (65 Fe) MG tablet Take 325 mg by mouth 2 times daily       Multiple Vitamins-Minerals (MULTIVITAMIN PO) Take 1 tablet by mouth daily      CINNAMON PO Take 1,000 mg by mouth 2 times daily      glucose blood VI test strips (FREESTYLE LITE) strip Test blood sugar once daily. 100 each 3     Current Facility-Administered Medications   Medication Dose Route Frequency Provider Last Rate Last Admin    sodium chloride flush 0.9 % injection 5-40 mL  5-40 mL IntraVENous 2 times per day Kunal Celaya MD        sodium chloride flush 0.9 % injection 5-40 mL  5-40 mL IntraVENous PRN Kunal Celaya MD        0.9 % sodium chloride infusion   IntraVENous PRN Kunal Celaya MD         Vital Signs (Current)   Vitals:    22 1620 23   BP:  124/69   Pulse:  65   Resp:  14   Temp:  97.6 °F (36.4 °C)   TempSrc:  Oral   SpO2:  100%   Weight: 210 lb (95.3 kg) 210 lb 5.1 oz (95.4 kg)   Height: 6' 2\" (1.88 m) 6' 2\" (1.88 m)                                            Vital Signs Statistics (for past 48 hrs)     Temp  Av.6 °F (36.4 °C)  Min: 97.6 °F (36.4 °C)   Min taken time: 23  Max: 97.6 °F (36.4 °C)   Max taken time: 23  Pulse  Av  Min: 72   Min taken time: 23  Max: 72   Max taken time: 23  Resp  Av  Min: 14   Min taken time: 23  Max: 14   Max taken time: 23  BP  Min: 124/69   Min taken time: 23  Max: 124/69   Max taken time: 23  SpO2  Av %  Min: 100 %   Min taken time: 23  Max: 100 %   Max taken time: 23  BP Readings from Last 3 Encounters:   23 124/69   12/15/22 (!) 137/46   22 (!) 117/54       BMI  Body mass index is 27 kg/m². Estimated body mass index is 27 kg/m² as calculated from the following:    Height as of this encounter: 6' 2\" (1.88 m). Weight as of this encounter: 210 lb 5.1 oz (95.4 kg).     CBC   Lab Results   Component Value Date/Time    WBC 3.7 2022 11:30 AM    RBC 3.69 2022 11:30 AM    RBC 4.03 2016 03:51 PM    HGB 11.0 2022 11:30 AM    HCT 32.4 2022 11:30 AM    MCV 87.9 08/03/2022 11:30 AM    RDW 15.3 08/03/2022 11:30 AM     11/28/2022 12:34 PM     CMP    Lab Results   Component Value Date/Time     06/06/2022 08:11 AM    K 4.0 06/06/2022 08:11 AM    K 3.8 12/06/2018 05:00 AM     06/06/2022 08:11 AM    CO2 20 06/06/2022 08:11 AM    BUN 9 06/06/2022 08:11 AM    CREATININE 0.9 06/06/2022 08:11 AM    GFRAA >60 06/06/2022 08:11 AM    GFRAA >60 03/08/2013 10:42 AM    AGRATIO 1.4 10/26/2019 10:18 AM    LABGLOM >60 06/06/2022 08:11 AM    GLUCOSE 121 06/06/2022 08:11 AM    PROT 6.8 10/26/2019 10:18 AM    PROT 7.5 03/08/2013 10:42 AM    CALCIUM 9.2 06/06/2022 08:11 AM    BILITOT 1.7 10/26/2019 10:18 AM    ALKPHOS 86 10/26/2019 10:18 AM    AST 33 11/14/2020 08:09 AM    ALT 33 11/14/2020 08:09 AM     BMP    Lab Results   Component Value Date/Time     06/06/2022 08:11 AM    K 4.0 06/06/2022 08:11 AM    K 3.8 12/06/2018 05:00 AM     06/06/2022 08:11 AM    CO2 20 06/06/2022 08:11 AM    BUN 9 06/06/2022 08:11 AM    CREATININE 0.9 06/06/2022 08:11 AM    CALCIUM 9.2 06/06/2022 08:11 AM    GFRAA >60 06/06/2022 08:11 AM    GFRAA >60 03/08/2013 10:42 AM    LABGLOM >60 06/06/2022 08:11 AM    GLUCOSE 121 06/06/2022 08:11 AM     POCGlucose  No results for input(s): GLUCOSE in the last 72 hours.    Coags    Lab Results   Component Value Date/Time    PROTIME 17.2 11/28/2022 12:34 PM    INR 1.41 11/28/2022 12:34 PM    APTT 36.5 10/13/2022 12:50 PM     HCG (If Applicable) No results found for: PREGTESTUR, PREGSERUM, HCG, HCGQUANT   ABGs   Lab Results   Component Value Date/Time    PHART 7.219 12/11/2015 08:55 AM    PO2ART 414 12/11/2015 08:55 AM    CDY3FAW 68 12/11/2015 08:55 AM    BCK3JGH 27.8 12/11/2015 08:55 AM    BEART 0 12/11/2015 08:55 AM    G0CUQXQD 100 12/11/2015 08:55 AM      Type & Screen (If Applicable)  No results found for: LABABO, LABRH                         BMI: Wt Readings from Last 3 Encounters:       NPO Status:   Date of last liquid consumption: 01/03/23   Time of last liquid consumption: 2000   Date of last solid food consumption: 01/03/23      Time of last solid consumption: 2000       Anesthesia Evaluation   no history of anesthetic complications:   Airway: Mallampati: II  TM distance: >3 FB   Neck ROM: full  Mouth opening: > = 3 FB   Dental:    (+) upper dentures and lower dentures      Pulmonary:       (-) pneumonia, asthma, shortness of breath, recent URI, sleep apnea, rhonchi, wheezes and rales                           Cardiovascular:  Exercise tolerance: good (>4 METS),   (+) hypertension: moderate, past MI:, CAD: obstructive, hyperlipidemia    (-) valvular problems/murmurs, CABG/stent, dysrhythmias,  angina,  SOUZA, weak pulses and no pulmonary hypertension      Rhythm: regular  Rate: normal                 ROS comment: EKG:  Normal sinus rhythm   Low voltage QRS    Normal stress echo in 2019     Neuro/Psych:      (-) seizures, TIA and CVA            ROS comment: Spinal stenosis GI/Hepatic/Renal:   (+) liver disease:,      (-) no renal disease, bowel prep and no morbid obesity       Endo/Other:    (+) Diabetes (HgbA1c: 7.1%)Type II DM, well controlled, , blood dyscrasia (Hgb: 11, chronic ITP): arthritis:., .    (-) hypothyroidism               Abdominal:             Vascular:     - PVD, DVT and PE. Other Findings:             Anesthesia Plan      MAC     ASA 3     (NPO appropriate. Mr. Sohan Ferguson denies active nausea / reflux.)  Induction: intravenous. Anesthetic plan and risks discussed with patient. Plan discussed with CRNA. This pre-anesthesia assessment may be used as a history and physical.    DOS STAFF ADDENDUM:    Pt seen and examined, chart reviewed (including anesthesia, drug and allergy history). No interval changes to history and physical examination. Anesthetic plan, risks, benefits, alternatives, and personnel involved discussed with patient.   Patient verbalized an understanding and agrees to proceed.       Vladimir Burton MD  January 4, 2023  8:01 AM Hypokalemia

## 2023-01-05 ENCOUNTER — HOSPITAL ENCOUNTER (OUTPATIENT)
Dept: INTERVENTIONAL RADIOLOGY/VASCULAR | Age: 73
Discharge: HOME | End: 2023-01-05
Payer: MEDICARE

## 2023-01-05 VITALS
TEMPERATURE: 97.1 F | HEART RATE: 63 BPM | SYSTOLIC BLOOD PRESSURE: 136 MMHG | OXYGEN SATURATION: 100 % | DIASTOLIC BLOOD PRESSURE: 51 MMHG | RESPIRATION RATE: 20 BRPM

## 2023-01-05 DIAGNOSIS — R18.8 OTHER ASCITES: ICD-10-CM

## 2023-01-05 LAB
APTT: 35.4 SEC (ref 23–34.3)
GLUCOSE BLD-MCNC: 133 MG/DL (ref 70–99)
INR BLD: 1.43 (ref 0.87–1.14)
PERFORMED ON: ABNORMAL
PLATELET # BLD: 88 K/UL (ref 135–450)
PROTHROMBIN TIME: 17.4 SEC (ref 11.7–14.5)

## 2023-01-05 PROCEDURE — 85610 PROTHROMBIN TIME: CPT

## 2023-01-05 PROCEDURE — 49083 ABD PARACENTESIS W/IMAGING: CPT

## 2023-01-05 PROCEDURE — 2500000003 HC RX 250 WO HCPCS: Performed by: RADIOLOGY

## 2023-01-05 PROCEDURE — 7100000011 HC PHASE II RECOVERY - ADDTL 15 MIN

## 2023-01-05 PROCEDURE — 7100000010 HC PHASE II RECOVERY - FIRST 15 MIN

## 2023-01-05 PROCEDURE — 85049 AUTOMATED PLATELET COUNT: CPT

## 2023-01-05 PROCEDURE — 36415 COLL VENOUS BLD VENIPUNCTURE: CPT

## 2023-01-05 PROCEDURE — C1729 CATH, DRAINAGE: HCPCS

## 2023-01-05 PROCEDURE — 85730 THROMBOPLASTIN TIME PARTIAL: CPT

## 2023-01-05 RX ORDER — LIDOCAINE HYDROCHLORIDE 10 MG/ML
10 INJECTION, SOLUTION EPIDURAL; INFILTRATION; INTRACAUDAL; PERINEURAL ONCE
Status: COMPLETED | OUTPATIENT
Start: 2023-01-05 | End: 2023-01-05

## 2023-01-05 RX ADMIN — LIDOCAINE HYDROCHLORIDE 10 ML: 10 INJECTION, SOLUTION EPIDURAL; INFILTRATION; INTRACAUDAL; PERINEURAL at 11:37

## 2023-01-05 ASSESSMENT — PAIN - FUNCTIONAL ASSESSMENT: PAIN_FUNCTIONAL_ASSESSMENT: NONE - DENIES PAIN

## 2023-01-05 NOTE — BRIEF OP NOTE
Brief Postoperative Note    Terra Aguirre  YOB: 1950  [id number]    Pre-operative Diagnosis: ascites    Post-operative Diagnosis: Same    Procedure: US-guided paracentesis    Anesthesia: Local    Surgeons: Candance Champagne MD    Estimated Blood Loss: Less than 5 mL    Complications: None    Specimens: ascites drained    Findings: Successful US-guided paracentesis.     Electronically signed by Candance Champagne, MD on 1/5/2023 at 11:21 AM

## 2023-01-05 NOTE — PROCEDURES
[address] [facility]                                 PROCEDURE NOTE    PATIENT NAME: Leno Salas                   :        1950  MED REC NO:   [id number]                          ROOM:  ACCOUNT NO:   [de-identified]                           ADMIT DATE: 2023  PROVIDER:     [employer] A. Lunda Homans, MD    DATE OF PROCEDURE:  2023    REFERRING PROVIDER:  Annel Bunch MD and Noella Aase, MD    PATIENT HISTORY:  A 58-year-old male, outpatient. OPERATION PERFORMED:  EGD. SURGEON:  Keith Degroot MD    INSTRUMENT USED:  Olympus GIF-Q180. ANESTHESIA:  The patient was premedicated with Diprivan intravenously as  administered by the anesthesiology service. INDICATIONS:  The patient has cirrhosis on the basis of MARIE. He  undergoes regular EGDs for purposes of variceal screening. PROCEDURE:  The endoscope was inserted into the esophagus without  difficulty. The esophageal mucosa revealed no evidence of inflammatory  or metaplastic change. Noted were persistent small distal variceal  channels that did not require banding. More prominent varices were  noted in the mid esophagus. The Z-line was located at 41 cm. In the  stomach, there were no gastric varices. There were very mild changes of  portal hypertensive gastropathy. There was persistent antral  nodularity. The duodenal bulb and descending duodenum were normal.    ESTIMATED BLOOD LOSS:  None. IMPRESSION:  1. Small distal esophageal varices that did not require banding. 2.  More prominent mid esophageal varices. 3.  No evidence of gastric varices. 4.  Mild portal hypertensive gastropathy. 5.  Persistent antral nodularity. PLAN:  The patient will continue to undergo surveillance EGDs every 6  months. KEITH Crocker MD    D: 2023 8:56:23       T: 2023 8:59:56     [email or web address]  Job#: [id number]     Doc#: [id number]    CC:  Gypsy Rutledge MD

## 2023-01-05 NOTE — PROGRESS NOTES
2500ml of fluid removed  Spoke to patients GILA graham and advised her the amount of fluid removed and that patient was returning to the floor.

## 2023-01-26 ENCOUNTER — HOSPITAL ENCOUNTER (OUTPATIENT)
Dept: INTERVENTIONAL RADIOLOGY/VASCULAR | Age: 73
Discharge: HOME | End: 2023-01-26
Payer: MEDICARE

## 2023-01-26 VITALS
TEMPERATURE: 97 F | WEIGHT: 222.2 LBS | SYSTOLIC BLOOD PRESSURE: 135 MMHG | HEART RATE: 64 BPM | OXYGEN SATURATION: 100 % | BODY MASS INDEX: 28.53 KG/M2 | DIASTOLIC BLOOD PRESSURE: 58 MMHG | RESPIRATION RATE: 16 BRPM

## 2023-01-26 DIAGNOSIS — R18.8 OTHER ASCITES: ICD-10-CM

## 2023-01-26 PROCEDURE — 7100000010 HC PHASE II RECOVERY - FIRST 15 MIN

## 2023-01-26 PROCEDURE — C1729 CATH, DRAINAGE: HCPCS

## 2023-01-26 PROCEDURE — 7100000011 HC PHASE II RECOVERY - ADDTL 15 MIN

## 2023-01-26 PROCEDURE — 49083 ABD PARACENTESIS W/IMAGING: CPT

## 2023-01-26 PROCEDURE — 2500000003 HC RX 250 WO HCPCS: Performed by: STUDENT IN AN ORGANIZED HEALTH CARE EDUCATION/TRAINING PROGRAM

## 2023-01-26 RX ORDER — LIDOCAINE HYDROCHLORIDE 10 MG/ML
10 INJECTION, SOLUTION EPIDURAL; INFILTRATION; INTRACAUDAL; PERINEURAL ONCE
Status: COMPLETED | OUTPATIENT
Start: 2023-01-26 | End: 2023-01-26

## 2023-01-26 RX ADMIN — LIDOCAINE HYDROCHLORIDE 10 ML: 10 INJECTION, SOLUTION EPIDURAL; INFILTRATION; INTRACAUDAL; PERINEURAL at 13:30

## 2023-01-26 ASSESSMENT — PAIN - FUNCTIONAL ASSESSMENT: PAIN_FUNCTIONAL_ASSESSMENT: 0-10

## 2023-01-26 ASSESSMENT — PAIN SCALES - GENERAL: PAINLEVEL_OUTOF10: 0

## 2023-01-26 NOTE — PROGRESS NOTES
5400ml of fluid removed  Spoke to patients GILA graham and advised her the amount of fluid removed and that patient was returning to the floor.

## 2023-01-26 NOTE — PROGRESS NOTES
Pt discharged home per MD orders. Pt and wife given discharge instructions including wound care, follow up, signs of complications and numbers to call with any questions. Pt and wife state no questions. Safely transported off unit with all belongings.

## 2023-02-09 ENCOUNTER — HOSPITAL ENCOUNTER (OUTPATIENT)
Dept: INTERVENTIONAL RADIOLOGY/VASCULAR | Age: 73
Discharge: HOME | End: 2023-02-09
Payer: MEDICARE

## 2023-02-09 VITALS
TEMPERATURE: 97.2 F | RESPIRATION RATE: 15 BRPM | SYSTOLIC BLOOD PRESSURE: 124 MMHG | DIASTOLIC BLOOD PRESSURE: 54 MMHG | HEART RATE: 64 BPM | OXYGEN SATURATION: 100 %

## 2023-02-09 DIAGNOSIS — R18.8 OTHER ASCITES: ICD-10-CM

## 2023-02-09 LAB
APTT: 37.9 SEC (ref 23–34.3)
GLUCOSE BLD-MCNC: 125 MG/DL (ref 70–99)
INR BLD: 1.44 (ref 0.87–1.14)
PERFORMED ON: ABNORMAL
PLATELET # BLD: 79 K/UL (ref 135–450)
PROTHROMBIN TIME: 17.5 SEC (ref 11.7–14.5)

## 2023-02-09 PROCEDURE — 7100000010 HC PHASE II RECOVERY - FIRST 15 MIN

## 2023-02-09 PROCEDURE — 36415 COLL VENOUS BLD VENIPUNCTURE: CPT

## 2023-02-09 PROCEDURE — 85610 PROTHROMBIN TIME: CPT

## 2023-02-09 PROCEDURE — 49083 ABD PARACENTESIS W/IMAGING: CPT

## 2023-02-09 PROCEDURE — 2500000003 HC RX 250 WO HCPCS: Performed by: STUDENT IN AN ORGANIZED HEALTH CARE EDUCATION/TRAINING PROGRAM

## 2023-02-09 PROCEDURE — 85730 THROMBOPLASTIN TIME PARTIAL: CPT

## 2023-02-09 PROCEDURE — 85049 AUTOMATED PLATELET COUNT: CPT

## 2023-02-09 PROCEDURE — C1729 CATH, DRAINAGE: HCPCS

## 2023-02-09 RX ORDER — LIDOCAINE HYDROCHLORIDE 10 MG/ML
10 INJECTION, SOLUTION EPIDURAL; INFILTRATION; INTRACAUDAL; PERINEURAL ONCE
Status: COMPLETED | OUTPATIENT
Start: 2023-02-09 | End: 2023-02-09

## 2023-02-09 RX ADMIN — LIDOCAINE HYDROCHLORIDE 10 ML: 10 INJECTION, SOLUTION EPIDURAL; INFILTRATION; INTRACAUDAL; PERINEURAL at 13:16

## 2023-02-09 ASSESSMENT — PAIN SCALES - GENERAL: PAINLEVEL_OUTOF10: 0

## 2023-02-09 NOTE — PROGRESS NOTES
2700ml of fluid removed  Spoke to patients GILA graham and advised her the amount of fluid removed and that patient was returning to the floor.

## 2023-02-09 NOTE — BRIEF OP NOTE
Brief Postoperative Note    Lizbeth Childs  YOB: 1950  [id number]    Pre-operative Diagnosis: Ascites    Post-operative Diagnosis: Same    Procedure: US Guided Paracentesis    Anesthesia: Local    Surgeons/Assistants: Angie Watkins MD    Estimated Blood Loss: less than 5 mL    Complications: None    Specimens: Was Obtained: serous Ascitic Fluid    Findings: Technically successful US guided paracentesis.     Electronically signed by Angie Watkins MD on 2/9/2023 at 1:24 PM

## 2023-02-23 ENCOUNTER — HOSPITAL ENCOUNTER (OUTPATIENT)
Dept: INTERVENTIONAL RADIOLOGY/VASCULAR | Age: 73
Discharge: HOME | End: 2023-02-23
Payer: MEDICARE

## 2023-02-23 VITALS
HEART RATE: 61 BPM | DIASTOLIC BLOOD PRESSURE: 44 MMHG | TEMPERATURE: 98 F | SYSTOLIC BLOOD PRESSURE: 129 MMHG | RESPIRATION RATE: 16 BRPM | OXYGEN SATURATION: 100 %

## 2023-02-23 DIAGNOSIS — R18.8 OTHER ASCITES: ICD-10-CM

## 2023-02-23 PROCEDURE — C1729 CATH, DRAINAGE: HCPCS

## 2023-02-23 PROCEDURE — 49083 ABD PARACENTESIS W/IMAGING: CPT

## 2023-02-23 PROCEDURE — 7100000010 HC PHASE II RECOVERY - FIRST 15 MIN

## 2023-02-23 PROCEDURE — 2500000003 HC RX 250 WO HCPCS: Performed by: INTERNAL MEDICINE

## 2023-02-23 RX ORDER — CLOPIDOGREL 300 MG/1
300 TABLET, FILM COATED ORAL ONCE
COMMUNITY

## 2023-02-23 RX ORDER — LIDOCAINE HYDROCHLORIDE 10 MG/ML
10 INJECTION, SOLUTION EPIDURAL; INFILTRATION; INTRACAUDAL; PERINEURAL ONCE
Status: COMPLETED | OUTPATIENT
Start: 2023-02-23 | End: 2023-02-23

## 2023-02-23 RX ADMIN — LIDOCAINE HYDROCHLORIDE 10 ML: 10 INJECTION, SOLUTION EPIDURAL; INFILTRATION; INTRACAUDAL; PERINEURAL at 13:42

## 2023-02-23 NOTE — PROGRESS NOTES
Pt returned to unit from IR report received at bedside. Pt denies any pain VSS wife at bedside.  Site to R abd cdi

## 2023-02-23 NOTE — DISCHARGE INSTRUCTIONS
Resume diet, rest quietly for 24 hours, avoid straining , heavy lifting, or strenuous physical activity until next day. Do not restart aspirin or Vitamin E until next day. Keep bandaid on site clean and dry, may remove tomorrow. Follow up with ordering physician when to restart anticoagulants and/or with any problems. Paracentesis  2/23/2023  Resume diet, avoid straining, heavy lifting, or strenuous physical activity until next day. No lifting more than 10 pounds or strenuous activity for 24 hours  Keep bandage on site, clean, and dry; may remove tomorrow. If fluid seepage occurs from the puncture site, it is often helpful to apply pressure to the site or lie sideways on the side of the puncture (applies pressure as well). If seepage continues after 30 minutes, contact your prescribing provider, Dr. Batista Litter Follow up with ordering physician for when to restart special medications and/or with any problems. Follow up with ordering doctor with any questions, concerns, or results of any testing done. You may receive a phone call from a nurse or tech to check on you in the next couple of days. The interventional radiologist you saw today does not usually follow-up with you after your procedure. He/she does not change or prescribe medications or treatments. All questions after today should be answered by your prescribing physician. You may resume any vitamin E, fish oil, blood thinners or anticoagulants (Plavix, coumadin, aspirin, Eliquis, etc.) tomorrow unless otherwise instructed. The interventional radiologist you saw today is Dr. Carina Limon, for your records. Today you had a paracentesis. We drained 2.5mL off your abdomen.  Paracentesis

## 2023-02-23 NOTE — BRIEF OP NOTE
Brief Postoperative Note    Odilia Raya  YOB: 1950  [id number]    Pre-operative Diagnosis: Ascites    Post-operative Diagnosis: Same    Procedure: US Guided Paracentesis    Anesthesia: Local    Surgeons/Assistants: Loree Woody MD    Estimated Blood Loss: less than 5 mL    Complications: None    Specimens: Was Obtained: serous Ascitic Fluid    Findings: Technically successful US guided paracentesis.     Electronically signed by Loree Woody MD on 2/23/2023 at 12:56 PM

## 2023-02-23 NOTE — PROGRESS NOTES
2500ml of fluid removed  Spoke to patients RN  and advised her the amount of fluid removed and that patient was returning to the floor.

## 2023-02-23 NOTE — PROGRESS NOTES
Discharge instructions reviewed with pt and wife all questions answered. Pt off unit via wc by nurse.

## 2023-03-09 ENCOUNTER — HOSPITAL ENCOUNTER (OUTPATIENT)
Dept: INTERVENTIONAL RADIOLOGY/VASCULAR | Age: 73
Discharge: HOME | End: 2023-03-09
Payer: MEDICARE

## 2023-03-09 VITALS
OXYGEN SATURATION: 100 % | DIASTOLIC BLOOD PRESSURE: 58 MMHG | RESPIRATION RATE: 16 BRPM | TEMPERATURE: 97.2 F | SYSTOLIC BLOOD PRESSURE: 155 MMHG | HEART RATE: 70 BPM

## 2023-03-09 DIAGNOSIS — R18.8 OTHER ASCITES: ICD-10-CM

## 2023-03-09 LAB
INR BLD: 1.31 (ref 0.87–1.14)
PLATELET # BLD: 83 K/UL (ref 135–450)
PROTHROMBIN TIME: 16.2 SEC (ref 11.7–14.5)

## 2023-03-09 PROCEDURE — 7100000011 HC PHASE II RECOVERY - ADDTL 15 MIN

## 2023-03-09 PROCEDURE — C1729 CATH, DRAINAGE: HCPCS

## 2023-03-09 PROCEDURE — 49083 ABD PARACENTESIS W/IMAGING: CPT

## 2023-03-09 PROCEDURE — 85049 AUTOMATED PLATELET COUNT: CPT

## 2023-03-09 PROCEDURE — 2500000003 HC RX 250 WO HCPCS: Performed by: STUDENT IN AN ORGANIZED HEALTH CARE EDUCATION/TRAINING PROGRAM

## 2023-03-09 PROCEDURE — 36415 COLL VENOUS BLD VENIPUNCTURE: CPT

## 2023-03-09 PROCEDURE — 7100000010 HC PHASE II RECOVERY - FIRST 15 MIN

## 2023-03-09 PROCEDURE — 85610 PROTHROMBIN TIME: CPT

## 2023-03-09 RX ORDER — LIDOCAINE HYDROCHLORIDE 10 MG/ML
10 INJECTION, SOLUTION EPIDURAL; INFILTRATION; INTRACAUDAL; PERINEURAL ONCE
Status: COMPLETED | OUTPATIENT
Start: 2023-03-09 | End: 2023-03-09

## 2023-03-09 RX ADMIN — LIDOCAINE HYDROCHLORIDE 10 ML: 10 INJECTION, SOLUTION EPIDURAL; INFILTRATION; INTRACAUDAL; PERINEURAL at 13:28

## 2023-03-09 NOTE — PROGRESS NOTES
3200ml of fluid removed  Spoke to patients GILA graham and advised her the amount of fluid removed and that patient was returning to the floor.

## 2023-03-23 ENCOUNTER — HOSPITAL ENCOUNTER (OUTPATIENT)
Dept: INTERVENTIONAL RADIOLOGY/VASCULAR | Age: 73
Discharge: HOME | End: 2023-03-23
Payer: MEDICARE

## 2023-03-23 VITALS
SYSTOLIC BLOOD PRESSURE: 146 MMHG | TEMPERATURE: 97.7 F | RESPIRATION RATE: 16 BRPM | HEART RATE: 78 BPM | DIASTOLIC BLOOD PRESSURE: 56 MMHG | OXYGEN SATURATION: 100 %

## 2023-03-23 DIAGNOSIS — R18.8 OTHER ASCITES: ICD-10-CM

## 2023-03-23 PROCEDURE — C1729 CATH, DRAINAGE: HCPCS

## 2023-03-23 PROCEDURE — 2500000003 HC RX 250 WO HCPCS: Performed by: INTERNAL MEDICINE

## 2023-03-23 PROCEDURE — 49083 ABD PARACENTESIS W/IMAGING: CPT

## 2023-03-23 PROCEDURE — 7100000010 HC PHASE II RECOVERY - FIRST 15 MIN

## 2023-03-23 RX ORDER — CARVEDILOL 6.25 MG/1
6.25 TABLET ORAL 2 TIMES DAILY
COMMUNITY

## 2023-03-23 RX ORDER — LIDOCAINE HYDROCHLORIDE 10 MG/ML
10 INJECTION, SOLUTION EPIDURAL; INFILTRATION; INTRACAUDAL; PERINEURAL ONCE
Status: COMPLETED | OUTPATIENT
Start: 2023-03-23 | End: 2023-03-23

## 2023-03-23 RX ADMIN — LIDOCAINE HYDROCHLORIDE 10 ML: 10 INJECTION, SOLUTION EPIDURAL; INFILTRATION; INTRACAUDAL; PERINEURAL at 13:09

## 2023-03-23 ASSESSMENT — PAIN SCALES - GENERAL: PAINLEVEL_OUTOF10: 0

## 2023-03-23 NOTE — DISCHARGE INSTRUCTIONS
Paracentesis  3/23/2023  Resume diet, avoid straining, heavy lifting, or strenuous physical activity until next day. No lifting more than 10 pounds or strenuous activity for 24 hours  Keep bandage on site, clean, and dry; may remove tomorrow. If fluid seepage occurs from the puncture site, it is often helpful to apply pressure to the site or lie sideways on the side of the puncture (applies pressure as well). If seepage continues after 30 minutes, contact your prescribing provider. Follow up with ordering physician for when to restart special medications and/or with any problems. Follow up with your with any questions, concerns, or results of any testing done. You may receive a phone call from a nurse or tech to check on you in the next couple of days. The interventional radiologist you saw today does not usually follow-up with you after your procedure. He/she does not change or prescribe medications or treatments. All questions after today should be answered by your prescribing physician. You may resume any vitamin E, fish oil, blood thinners or anticoagulants (Plavix, coumadin, aspirin, Eliquis, etc.) tomorrow unless otherwise instructed. The interventional radiologist you saw today is  for your records. Today you had a paracentesis. We drained 3400mL off your abdomen. Then we gave you 0g albumin to prevent a fluid shift in your body.

## 2023-03-23 NOTE — BRIEF OP NOTE
Brief Postoperative Note    Destinee Schofield  YOB: 1950  [id number]    Pre-operative Diagnosis: Ascites    Post-operative Diagnosis: Same    Procedure: US Guided Paracentesis    Anesthesia: Local    Surgeons/Assistants: Gigi Johnson MD    Estimated Blood Loss: less than 5 mL    Complications: None    Specimens: Was Obtained: serous Ascitic Fluid    Findings: Technically successful US guided paracentesis.     Electronically signed by Gigi Johnson MD on 3/23/2023 at 1:10 PM

## 2023-04-06 ENCOUNTER — HOSPITAL ENCOUNTER (OUTPATIENT)
Dept: INTERVENTIONAL RADIOLOGY/VASCULAR | Age: 73
Discharge: HOME | End: 2023-04-06
Payer: MEDICARE

## 2023-04-06 VITALS
HEART RATE: 80 BPM | DIASTOLIC BLOOD PRESSURE: 69 MMHG | TEMPERATURE: 97.3 F | OXYGEN SATURATION: 100 % | RESPIRATION RATE: 18 BRPM | SYSTOLIC BLOOD PRESSURE: 136 MMHG

## 2023-04-06 DIAGNOSIS — R18.8 OTHER ASCITES: ICD-10-CM

## 2023-04-06 LAB
INR PPP: 1.34 (ref 0.84–1.16)
PLATELET # BLD AUTO: 85 K/UL (ref 135–450)
PROTHROMBIN TIME: 16.6 SEC (ref 11.5–14.8)

## 2023-04-06 PROCEDURE — 85610 PROTHROMBIN TIME: CPT

## 2023-04-06 PROCEDURE — 2500000003 HC RX 250 WO HCPCS: Performed by: STUDENT IN AN ORGANIZED HEALTH CARE EDUCATION/TRAINING PROGRAM

## 2023-04-06 PROCEDURE — 85049 AUTOMATED PLATELET COUNT: CPT

## 2023-04-06 PROCEDURE — 36415 COLL VENOUS BLD VENIPUNCTURE: CPT

## 2023-04-06 PROCEDURE — 7100000010 HC PHASE II RECOVERY - FIRST 15 MIN

## 2023-04-06 PROCEDURE — 49083 ABD PARACENTESIS W/IMAGING: CPT

## 2023-04-06 PROCEDURE — C1729 CATH, DRAINAGE: HCPCS

## 2023-04-06 RX ORDER — LIDOCAINE HYDROCHLORIDE 10 MG/ML
10 INJECTION, SOLUTION EPIDURAL; INFILTRATION; INTRACAUDAL; PERINEURAL ONCE
Status: COMPLETED | OUTPATIENT
Start: 2023-04-06 | End: 2023-04-06

## 2023-04-06 RX ADMIN — LIDOCAINE HYDROCHLORIDE 10 ML: 10 INJECTION, SOLUTION EPIDURAL; INFILTRATION; INTRACAUDAL; PERINEURAL at 13:42

## 2023-04-06 ASSESSMENT — PAIN - FUNCTIONAL ASSESSMENT: PAIN_FUNCTIONAL_ASSESSMENT: NONE - DENIES PAIN

## 2023-04-06 NOTE — PROGRESS NOTES
2200ml of fluid removed  Spoke to patients GILA graham and advised her the amount of fluid removed and that patient was returning to the floor.

## 2023-04-06 NOTE — PROGRESS NOTES
Pt back to [facility] vss dsg to right lower abd cdi no c/o pain or sob discharge instructions given belongings returned discharged in stable condition

## 2023-04-06 NOTE — BRIEF OP NOTE
Brief Postoperative Note    Kellie Darling  YOB: 1950  [id number]    Pre-operative Diagnosis: Ascites    Post-operative Diagnosis: Same    Procedure: US Guided Paracentesis    Anesthesia: Local    Surgeons/Assistants: Freddie East MD    Estimated Blood Loss: less than 5 mL    Complications: None    Specimens: Was Obtained: serous Ascitic Fluid    Findings: Technically successful US guided paracentesis.     Electronically signed by Freddie East MD on 4/6/2023 at 1:40 PM

## 2023-04-06 NOTE — DISCHARGE INSTRUCTIONS
Paracentesis  4/6/2023  Resume diet, avoid straining, heavy lifting, or strenuous physical activity until next day. No lifting more than 10 pounds or strenuous activity for 24 hours  Keep bandage on site, clean, and dry; may remove tomorrow. If fluid seepage occurs from the puncture site, it is often helpful to apply pressure to the site or lie sideways on the side of the puncture (applies pressure as well). If seepage continues after 30 minutes, contact your prescribing provider. Follow up with ordering physician for when to restart special medications and/or with any problems. You may receive a phone call from a nurse or tech to check on you in the next couple of days. The interventional radiologist you saw today does not usually follow-up with you after your procedure. He/she does not change or prescribe medications or treatments. All questions after today should be answered by your prescribing physician. You may resume any vitamin E, fish oil, blood thinners or anticoagulants (Plavix, coumadin, aspirin, Eliquis, etc.) tomorrow unless otherwise instructed. Today you had a paracentesis. We drained 2200 mL off your abdomen.

## 2023-04-20 ENCOUNTER — HOSPITAL ENCOUNTER (OUTPATIENT)
Dept: INTERVENTIONAL RADIOLOGY/VASCULAR | Age: 73
Discharge: HOME | End: 2023-04-20
Payer: MEDICARE

## 2023-04-20 VITALS
SYSTOLIC BLOOD PRESSURE: 135 MMHG | DIASTOLIC BLOOD PRESSURE: 58 MMHG | HEART RATE: 83 BPM | TEMPERATURE: 97 F | RESPIRATION RATE: 16 BRPM | OXYGEN SATURATION: 100 %

## 2023-04-20 DIAGNOSIS — R18.8 OTHER ASCITES: ICD-10-CM

## 2023-04-20 PROCEDURE — 7100000011 HC PHASE II RECOVERY - ADDTL 15 MIN

## 2023-04-20 PROCEDURE — 49083 ABD PARACENTESIS W/IMAGING: CPT

## 2023-04-20 PROCEDURE — 7100000010 HC PHASE II RECOVERY - FIRST 15 MIN

## 2023-04-20 PROCEDURE — 2500000003 HC RX 250 WO HCPCS: Performed by: STUDENT IN AN ORGANIZED HEALTH CARE EDUCATION/TRAINING PROGRAM

## 2023-04-20 PROCEDURE — C1729 CATH, DRAINAGE: HCPCS

## 2023-04-20 RX ORDER — LIDOCAINE HYDROCHLORIDE 10 MG/ML
10 INJECTION, SOLUTION EPIDURAL; INFILTRATION; INTRACAUDAL; PERINEURAL ONCE
Status: COMPLETED | OUTPATIENT
Start: 2023-04-20 | End: 2023-04-20

## 2023-04-20 RX ADMIN — LIDOCAINE HYDROCHLORIDE 10 ML: 10 INJECTION, SOLUTION EPIDURAL; INFILTRATION; INTRACAUDAL; PERINEURAL at 13:14

## 2023-04-20 NOTE — PROGRESS NOTES
2300ml of fluid removed  Spoke to patients GILA graham and advised her the amount of fluid removed and that patient was returning to the floor.

## 2023-04-20 NOTE — PROGRESS NOTES
Pt to preop from home, pt awake alert and oriented, vss, resp even and easy. Abd soft. Wife at bedside.

## 2023-05-04 ENCOUNTER — HOSPITAL ENCOUNTER (OUTPATIENT)
Dept: INTERVENTIONAL RADIOLOGY/VASCULAR | Age: 73
Discharge: HOME | End: 2023-05-04
Payer: MEDICARE

## 2023-05-04 VITALS
SYSTOLIC BLOOD PRESSURE: 141 MMHG | DIASTOLIC BLOOD PRESSURE: 62 MMHG | HEART RATE: 74 BPM | OXYGEN SATURATION: 100 % | RESPIRATION RATE: 16 BRPM | TEMPERATURE: 97.1 F

## 2023-05-04 DIAGNOSIS — R18.8 OTHER ASCITES: ICD-10-CM

## 2023-05-04 LAB
INR PPP: 1.3 (ref 0.84–1.16)
PLATELET # BLD AUTO: 93 K/UL (ref 135–450)
PROTHROMBIN TIME: 16.2 SEC (ref 11.5–14.8)

## 2023-05-04 PROCEDURE — 85049 AUTOMATED PLATELET COUNT: CPT

## 2023-05-04 PROCEDURE — 85610 PROTHROMBIN TIME: CPT

## 2023-05-04 PROCEDURE — C1729 CATH, DRAINAGE: HCPCS

## 2023-05-04 PROCEDURE — 2500000003 HC RX 250 WO HCPCS: Performed by: STUDENT IN AN ORGANIZED HEALTH CARE EDUCATION/TRAINING PROGRAM

## 2023-05-04 PROCEDURE — 36415 COLL VENOUS BLD VENIPUNCTURE: CPT

## 2023-05-04 PROCEDURE — 49083 ABD PARACENTESIS W/IMAGING: CPT

## 2023-05-04 PROCEDURE — 7100000010 HC PHASE II RECOVERY - FIRST 15 MIN

## 2023-05-04 RX ORDER — LIDOCAINE HYDROCHLORIDE 10 MG/ML
10 INJECTION, SOLUTION EPIDURAL; INFILTRATION; INTRACAUDAL; PERINEURAL ONCE
Status: COMPLETED | OUTPATIENT
Start: 2023-05-04 | End: 2023-05-04

## 2023-05-04 RX ADMIN — LIDOCAINE HYDROCHLORIDE 10 ML: 10 INJECTION, SOLUTION EPIDURAL; INFILTRATION; INTRACAUDAL; PERINEURAL at 13:38

## 2023-05-04 NOTE — PROGRESS NOTES
850ml of fluid removed  Spoke to patients GILA graham and advised her the amount of fluid removed and that patient was returning to the floor.

## 2023-05-18 ENCOUNTER — HOSPITAL ENCOUNTER (OUTPATIENT)
Dept: INTERVENTIONAL RADIOLOGY/VASCULAR | Age: 73
Discharge: HOME | End: 2023-05-18
Payer: MEDICARE

## 2023-05-18 VITALS
SYSTOLIC BLOOD PRESSURE: 124 MMHG | RESPIRATION RATE: 16 BRPM | HEART RATE: 74 BPM | TEMPERATURE: 98.4 F | DIASTOLIC BLOOD PRESSURE: 46 MMHG | OXYGEN SATURATION: 100 %

## 2023-05-18 DIAGNOSIS — R18.8 OTHER ASCITES: ICD-10-CM

## 2023-05-18 PROCEDURE — C1729 CATH, DRAINAGE: HCPCS

## 2023-05-18 PROCEDURE — 7100000011 HC PHASE II RECOVERY - ADDTL 15 MIN

## 2023-05-18 PROCEDURE — 7100000010 HC PHASE II RECOVERY - FIRST 15 MIN

## 2023-05-18 PROCEDURE — 2500000003 HC RX 250 WO HCPCS: Performed by: STUDENT IN AN ORGANIZED HEALTH CARE EDUCATION/TRAINING PROGRAM

## 2023-05-18 PROCEDURE — 49083 ABD PARACENTESIS W/IMAGING: CPT

## 2023-05-18 RX ORDER — LIDOCAINE HYDROCHLORIDE 10 MG/ML
10 INJECTION, SOLUTION EPIDURAL; INFILTRATION; INTRACAUDAL; PERINEURAL ONCE
Status: COMPLETED | OUTPATIENT
Start: 2023-05-18 | End: 2023-05-18

## 2023-05-18 RX ADMIN — LIDOCAINE HYDROCHLORIDE 10 ML: 10 INJECTION, SOLUTION EPIDURAL; INFILTRATION; INTRACAUDAL; PERINEURAL at 13:05

## 2023-05-18 ASSESSMENT — PAIN - FUNCTIONAL ASSESSMENT: PAIN_FUNCTIONAL_ASSESSMENT: 0-10

## 2023-05-18 NOTE — DISCHARGE INSTRUCTIONS
Resume diet, rest quietly for 24 hours, avoid straining , heavy lifting, or strenuous physical activity until next day. Do not restart aspirin or Vitamin E until next day. Keep bandaid on site clean and dry, may remove tomorrow. Follow up with ordering physician when to restart anticoagulants and/or with any problems.  We removed 100 MLS

## 2023-05-18 NOTE — PROGRESS NOTES
100ml of fluid removed  Spoke to patients GILA graham and advised her the amount of fluid removed and that patient was returning to the floor.

## 2023-08-23 NOTE — PROGRESS NOTES
[facility]   Cardiac Follow up     Referring Provider:  Annabella Leyden     Chief Complaint   Patient presents with    Coronary Artery Disease    Hypertension    Hyperlipidemia    6 Month Follow-Up      History of Present Illness:  Aide Dos Santos is a 74 y. o.male with a history of NSTEMI / CAD s/p PTCA RCA (12/2018), hypertension, and hyperlipidemia. His other history includes: JOSE LUIS, esophageal varices, mild portal hypertensive gastropathy / portal HTN with cirrhosis and ITP. Nuclear stress test in June was without ischemia. He reports at the time of his 2018 stenting he was having dyspnea. He has reported that his wife has lung cancer (she is nonsmoker). Arpan Cochran did undergo a hernia repair (4/6/21) with Dr. Dahlia Schmidt without significant complication. He also continues to follow with GI and had EGD in September. On 12/5/22 he went for a transplant assessment for liver. Arpan Cochran reports that he has fluid accumulation in his abdomen, and he gets paracentesis about every 3-4 weeks. He reports that they  typically drain about 4-5 L of fluid - first paracentesis was in September. Last done on 5/18/23 with 100cc fluid removed. He underwent 2 coronary stent placements 1/30/23 at [facility]. He was admitted to [facility] 5/13/23 for a liver transplant. Unfortunately, the donor graft was deemed unsuitable for transplantation, and the case was cancelled. However, he was able to proceed with a full cadaver liver transplant 6/8/23; he required blood & FFP transfusions. He reports recovery was \"rough\" at first for a few days. But has since recovered well and no longer needs paracentesis. He now takes Prograf & Cellcept. He is no longer on nadolol. Today, Arpan Cochran states he has been feeling fairly well this summer. He denies exertional chest pain, SOUZA/PND, palpitations, light-headedness, edema. He stays busy, has no complaints. He gets weekly blood work thru [employer].  His wife still fighting probable lung cancer, on

## 2023-09-21 ENCOUNTER — OFFICE VISIT (OUTPATIENT)
Dept: CARDIOLOGY CLINIC | Age: 73
End: 2023-09-21
Payer: MEDICARE

## 2023-09-21 VITALS
DIASTOLIC BLOOD PRESSURE: 58 MMHG | BODY MASS INDEX: 25.18 KG/M2 | HEART RATE: 88 BPM | HEIGHT: 74 IN | WEIGHT: 196.2 LBS | SYSTOLIC BLOOD PRESSURE: 108 MMHG | OXYGEN SATURATION: 99 %

## 2023-09-21 DIAGNOSIS — I25.10 CORONARY ARTERY DISEASE INVOLVING NATIVE CORONARY ARTERY OF NATIVE HEART WITHOUT ANGINA PECTORIS: Primary | ICD-10-CM

## 2023-09-21 DIAGNOSIS — E78.2 MIXED HYPERLIPIDEMIA: ICD-10-CM

## 2023-09-21 DIAGNOSIS — I10 HYPERTENSION, UNSPECIFIED TYPE: ICD-10-CM

## 2023-09-21 PROCEDURE — G8417 CALC BMI ABV UP PARAM F/U: HCPCS | Performed by: INTERNAL MEDICINE

## 2023-09-21 PROCEDURE — 3074F SYST BP LT 130 MM HG: CPT | Performed by: INTERNAL MEDICINE

## 2023-09-21 PROCEDURE — 99214 OFFICE O/P EST MOD 30 MIN: CPT | Performed by: INTERNAL MEDICINE

## 2023-09-21 PROCEDURE — 3078F DIAST BP <80 MM HG: CPT | Performed by: INTERNAL MEDICINE

## 2023-09-21 PROCEDURE — 1036F TOBACCO NON-USER: CPT | Performed by: INTERNAL MEDICINE

## 2023-09-21 PROCEDURE — G8427 DOCREV CUR MEDS BY ELIG CLIN: HCPCS | Performed by: INTERNAL MEDICINE

## 2023-09-21 PROCEDURE — 1123F ACP DISCUSS/DSCN MKR DOCD: CPT | Performed by: INTERNAL MEDICINE

## 2023-09-21 PROCEDURE — 3017F COLORECTAL CA SCREEN DOC REV: CPT | Performed by: INTERNAL MEDICINE

## 2023-09-21 RX ORDER — TACROLIMUS 1 MG/1
1 CAPSULE ORAL 2 TIMES DAILY
COMMUNITY
Start: 2023-06-15

## 2023-09-21 RX ORDER — MYCOPHENOLATE MOFETIL 250 MG/1
250 CAPSULE ORAL 2 TIMES DAILY
COMMUNITY
Start: 2023-08-09

## 2023-09-21 RX ORDER — CLOPIDOGREL BISULFATE 75 MG/1
TABLET ORAL
COMMUNITY
Start: 2023-09-07

## 2023-09-21 RX ORDER — INSULIN ASPART 100 [IU]/ML
INJECTION, SOLUTION INTRAVENOUS; SUBCUTANEOUS
COMMUNITY
Start: 2023-07-20

## 2024-03-20 NOTE — PROGRESS NOTES
[facility]   Cardiac Follow up     Referring Provider:  Kirti Wood     No chief complaint on file.     History of Present Illness:  Dean Glasgow is a 73 y.o.male with a history of NSTEMI / CAD s/p PTCA RCA (12/2018), hypertension, and hyperlipidemia.  His other history includes: JOSE LUIS, esophageal varices, mild portal hypertensive gastropathy / portal HTN with cirrhosis and ITP.  Nuclear stress test in June was without ischemia. He reports at the time of his 2018 stenting he was having dyspnea. He has reported that his wife has lung cancer (she is nonsmoker).    Khadar did undergo a hernia repair (4/6/21) with Dr. Ricks without significant complication. He also continues to follow with GI and had EGD in September.    On 12/5/22 he went for a transplant assessment for liver. Khadar reports that he has fluid accumulation in his abdomen, and he gets paracentesis about every 3-4 weeks. He reports that they  typically drain about 4-5 L of fluid - first paracentesis was in September.  Last done on 5/18/23 with 100cc fluid removed.    He underwent 2 coronary stent placements 1/30/23 at .    He was admitted to  5/13/23 for a liver transplant. Unfortunately, the donor graft was deemed unsuitable for transplantation, and the case was cancelled. However, he was able to proceed with a full cadaver liver transplant 6/8/23; he required blood & FFP transfusions. He reports recovery was \"rough\" at first for a few days. But has since recovered well and no longer needs paracentesis. He now takes Prograf & Cellcept. He is no longer on nadolol.    He presented to ER 3/17/24 after fall - unwitnessed. Family heard fall and EMS responded and noted him to have obvious left facial trauma. Glucose 100s. VSS. GCS 13 for them. If they tried to stand him up he would start to moan and seemed to briefly pass out. When they would sit him down he would quickly come back too. No reported blood thinners. CT showed

## 2024-03-21 NOTE — PROGRESS NOTES
[facility]   Cardiac Follow up     Referring Provider:  Kirti Wood     Chief Complaint   Patient presents with    Hyperlipidemia     No cardiac symptoms at this time.    Coronary Artery Disease    Hypertension    6 Month Follow-Up      History of Present Illness:  Dean Glasgow is a 73 y.o.male with a history of NSTEMI / CAD s/p PTCA RCA (12/2018), hypertension, and hyperlipidemia.  His other history includes: JOSE LUIS, esophageal varices, mild portal hypertensive gastropathy / portal HTN with cirrhosis and ITP.  Nuclear stress test in June was without ischemia. He reports at the time of his 2018 stenting he was having dyspnea. He has reported that his wife has lung cancer (she is nonsmoker).    Khadar did undergo a hernia repair (4/6/21) with Dr. Ricks without significant complication. He also continues to follow with GI and had EGD in September.    On 12/5/22 he went for a transplant assessment for liver. Khadar reports that he has fluid accumulation in his abdomen, and he gets paracentesis about every 3-4 weeks. He reports that they  typically drain about 4-5 L of fluid - first paracentesis was in September.  Last done on 5/18/23 with 100cc fluid removed.    He underwent 2 coronary stent placements 1/30/23 at .    He was admitted to  5/13/23 for a liver transplant. Unfortunately, the donor graft was deemed unsuitable for transplantation, and the case was cancelled. However, he was able to proceed with a full cadaver liver transplant 6/8/23; he required blood & FFP transfusions. He reports recovery was \"rough\" at first for a few days. But has since recovered well and no longer needs paracentesis. He now takes Prograf & Cellcept. He is no longer on nadolol.    He presented to ER 3/17/24 after fall - unwitnessed. Family heard fall and EMS responded and noted him to have obvious left facial trauma. Glucose 100s. VSS. GCS 13 for them. If they tried to stand him up he would start to moan and

## 2024-03-28 ENCOUNTER — OFFICE VISIT (OUTPATIENT)
Dept: CARDIOLOGY CLINIC | Age: 74
End: 2024-03-28
Payer: MEDICARE

## 2024-03-28 VITALS
BODY MASS INDEX: 26.31 KG/M2 | DIASTOLIC BLOOD PRESSURE: 62 MMHG | HEART RATE: 89 BPM | HEIGHT: 74 IN | WEIGHT: 205 LBS | SYSTOLIC BLOOD PRESSURE: 136 MMHG | OXYGEN SATURATION: 99 %

## 2024-03-28 DIAGNOSIS — I10 HYPERTENSION, UNSPECIFIED TYPE: ICD-10-CM

## 2024-03-28 DIAGNOSIS — R55 SYNCOPE AND COLLAPSE: Primary | ICD-10-CM

## 2024-03-28 DIAGNOSIS — E78.2 MIXED HYPERLIPIDEMIA: ICD-10-CM

## 2024-03-28 DIAGNOSIS — I25.10 CORONARY ARTERY DISEASE INVOLVING NATIVE CORONARY ARTERY OF NATIVE HEART WITHOUT ANGINA PECTORIS: ICD-10-CM

## 2024-03-28 PROCEDURE — 93000 ELECTROCARDIOGRAM COMPLETE: CPT | Performed by: INTERNAL MEDICINE

## 2024-03-28 PROCEDURE — G8417 CALC BMI ABV UP PARAM F/U: HCPCS | Performed by: INTERNAL MEDICINE

## 2024-03-28 PROCEDURE — 1036F TOBACCO NON-USER: CPT | Performed by: INTERNAL MEDICINE

## 2024-03-28 PROCEDURE — 99214 OFFICE O/P EST MOD 30 MIN: CPT | Performed by: INTERNAL MEDICINE

## 2024-03-28 PROCEDURE — 3075F SYST BP GE 130 - 139MM HG: CPT | Performed by: INTERNAL MEDICINE

## 2024-03-28 PROCEDURE — G8484 FLU IMMUNIZE NO ADMIN: HCPCS | Performed by: INTERNAL MEDICINE

## 2024-03-28 PROCEDURE — G8427 DOCREV CUR MEDS BY ELIG CLIN: HCPCS | Performed by: INTERNAL MEDICINE

## 2024-03-28 PROCEDURE — 1123F ACP DISCUSS/DSCN MKR DOCD: CPT | Performed by: INTERNAL MEDICINE

## 2024-03-28 PROCEDURE — 3017F COLORECTAL CA SCREEN DOC REV: CPT | Performed by: INTERNAL MEDICINE

## 2024-03-28 PROCEDURE — 3078F DIAST BP <80 MM HG: CPT | Performed by: INTERNAL MEDICINE

## 2024-03-28 RX ORDER — GLIPIZIDE 10 MG/1
10 TABLET ORAL
COMMUNITY

## 2024-03-28 NOTE — PATIENT INSTRUCTIONS
Continue current medications    30 day cardiac event monitor - placed in office today - Will call you with results    Echocardiogram soon at the [facility]    Follow up with Dr. Morin in 3-4 months

## 2024-04-10 ENCOUNTER — PROCEDURE VISIT (OUTPATIENT)
Dept: CARDIOLOGY CLINIC | Age: 74
End: 2024-04-10
Payer: MEDICARE

## 2024-04-10 DIAGNOSIS — R55 SYNCOPE AND COLLAPSE: ICD-10-CM

## 2024-04-10 DIAGNOSIS — I25.10 CORONARY ARTERY DISEASE INVOLVING NATIVE CORONARY ARTERY OF NATIVE HEART WITHOUT ANGINA PECTORIS: ICD-10-CM

## 2024-04-10 PROCEDURE — 93306 TTE W/DOPPLER COMPLETE: CPT | Performed by: INTERNAL MEDICINE

## 2024-06-27 NOTE — PROGRESS NOTES
[facility]   Cardiac Follow up     Referring Provider:  Kirti Wood MD     Chief Complaint   Patient presents with    Hypertension     No cardiac symptoms at this time.     Coronary Artery Disease    Hyperlipidemia    Follow-up     4 month      History of Present Illness:  Dean Glasgow is a 74 y.o.male with a history of NSTEMI / CAD s/p PTCA RCA (12/2018), hypertension, and hyperlipidemia.  His other history includes: JOSE LUIS, esophageal varices, mild portal hypertensive gastropathy / portal HTN with cirrhosis and ITP.  Nuclear stress test in June was without ischemia. He reports at the time of his 2018 stenting he was having dyspnea. He has reported that his wife has lung cancer (she is nonsmoker).    Khadar did undergo a hernia repair (4/6/21) with Dr. Ricks without significant complication. He also continues to follow with GI and had EGD in September.    On 12/5/22 he went for a transplant assessment for liver. Khadar reports that he has fluid accumulation in his abdomen, and he gets paracentesis about every 3-4 weeks. He reports that they  typically drain about 4-5 L of fluid - first paracentesis was in September.  Last done on 5/18/23 with 100cc fluid removed.    He underwent 2 coronary stent placements 1/30/23 at .    He was admitted to  5/13/23 for a liver transplant. Unfortunately, the donor graft was deemed unsuitable for transplantation, and the case was cancelled. However, he was able to proceed with a full cadaver liver transplant 6/8/23; he required blood & FFP transfusions. He reports recovery was \"rough\" at first for a few days. But has since recovered well and no longer needs paracentesis. He now takes Prograf & Cellcept. He is no longer on nadolol.    He presented to ER 3/17/24 after fall - unwitnessed. Family heard fall and EMS responded and noted him to have obvious left facial trauma. Glucose 100s. VSS. GCS 13 for them. If they tried to stand him up he would start to

## 2024-07-18 ENCOUNTER — OFFICE VISIT (OUTPATIENT)
Dept: CARDIOLOGY CLINIC | Age: 74
End: 2024-07-18
Payer: MEDICARE

## 2024-07-18 VITALS
OXYGEN SATURATION: 99 % | SYSTOLIC BLOOD PRESSURE: 114 MMHG | BODY MASS INDEX: 28.13 KG/M2 | DIASTOLIC BLOOD PRESSURE: 60 MMHG | WEIGHT: 219.2 LBS | HEIGHT: 74 IN | HEART RATE: 77 BPM

## 2024-07-18 DIAGNOSIS — R55 SYNCOPE AND COLLAPSE: ICD-10-CM

## 2024-07-18 DIAGNOSIS — E78.2 MIXED HYPERLIPIDEMIA: ICD-10-CM

## 2024-07-18 DIAGNOSIS — I10 HYPERTENSION, UNSPECIFIED TYPE: ICD-10-CM

## 2024-07-18 DIAGNOSIS — I25.10 CORONARY ARTERY DISEASE INVOLVING NATIVE CORONARY ARTERY OF NATIVE HEART WITHOUT ANGINA PECTORIS: Primary | ICD-10-CM

## 2024-07-18 PROCEDURE — G8427 DOCREV CUR MEDS BY ELIG CLIN: HCPCS | Performed by: INTERNAL MEDICINE

## 2024-07-18 PROCEDURE — 1123F ACP DISCUSS/DSCN MKR DOCD: CPT | Performed by: INTERNAL MEDICINE

## 2024-07-18 PROCEDURE — G8417 CALC BMI ABV UP PARAM F/U: HCPCS | Performed by: INTERNAL MEDICINE

## 2024-07-18 PROCEDURE — 3078F DIAST BP <80 MM HG: CPT | Performed by: INTERNAL MEDICINE

## 2024-07-18 PROCEDURE — 99214 OFFICE O/P EST MOD 30 MIN: CPT | Performed by: INTERNAL MEDICINE

## 2024-07-18 PROCEDURE — 1036F TOBACCO NON-USER: CPT | Performed by: INTERNAL MEDICINE

## 2024-07-18 PROCEDURE — 3074F SYST BP LT 130 MM HG: CPT | Performed by: INTERNAL MEDICINE

## 2024-07-18 RX ORDER — CARVEDILOL 6.25 MG/1
6.25 TABLET ORAL 2 TIMES DAILY
Qty: 90 TABLET | Refills: 3 | Status: SHIPPED | OUTPATIENT
Start: 2024-07-18

## 2024-07-18 RX ORDER — GLIPIZIDE 2.5 MG/1
2.5 TABLET, EXTENDED RELEASE ORAL DAILY
COMMUNITY
Start: 2024-07-08

## 2024-07-18 NOTE — PATIENT INSTRUCTIONS
If surgery for hernia is needed, recommend stress test prior to clearance  increase Coreg 6.25 twice daily  Continue risk factor modifications.   Call for any change in symptoms, call to report any changes in shortness of breath or development of chest pain with activity.    Follow up in 6 mos

## 2024-07-31 ENCOUNTER — TELEPHONE (OUTPATIENT)
Dept: CARDIOLOGY CLINIC | Age: 74
End: 2024-07-31

## 2024-07-31 DIAGNOSIS — Z01.810 PRE-OPERATIVE CARDIOVASCULAR EXAMINATION: Primary | ICD-10-CM

## 2024-07-31 NOTE — TELEPHONE ENCOUNTER
DEMETRIO saw 7/18/24 in office >     If surgery for hernia is needed, recommend stress test prior to clearance

## 2024-07-31 NOTE — TELEPHONE ENCOUNTER
Pt called to ask LES to order him a stress test and upload in Epic in his chart. Pt is needing this before 08/22.  Please call to discuss his concerns.  Thank you

## 2024-08-12 ENCOUNTER — HOSPITAL ENCOUNTER (OUTPATIENT)
Age: 74
Discharge: HOME | End: 2024-08-14
Attending: INTERNAL MEDICINE
Payer: MEDICARE

## 2024-08-12 VITALS
SYSTOLIC BLOOD PRESSURE: 178 MMHG | BODY MASS INDEX: 27.72 KG/M2 | DIASTOLIC BLOOD PRESSURE: 69 MMHG | WEIGHT: 216 LBS | HEIGHT: 74 IN | HEART RATE: 77 BPM

## 2024-08-12 VITALS — HEIGHT: 74 IN | WEIGHT: 216 LBS | BODY MASS INDEX: 27.72 KG/M2

## 2024-08-12 DIAGNOSIS — Z01.810 PRE-OPERATIVE CARDIOVASCULAR EXAMINATION: ICD-10-CM

## 2024-08-12 LAB
ECHO BSA: 2.26 M2
NUC STRESS EJECTION FRACTION: 56 %
NUC STRESS LV EDV: 115 ML (ref 67–155)
NUC STRESS LV ESV: 51 ML (ref 22–58)
NUC STRESS LV MASS: 147 G
STRESS BASELINE DIAS BP: 69 MMHG
STRESS BASELINE HR: 77 BPM
STRESS BASELINE ST DEPRESSION: 0 MM
STRESS BASELINE SYS BP: 178 MMHG
STRESS ESTIMATED WORKLOAD: 5.6 METS
STRESS EXERCISE DUR MIN: 4 MIN
STRESS EXERCISE DUR SEC: 10 SEC
STRESS O2 SAT PEAK: 98 %
STRESS O2 SAT REST: 99 %
STRESS PEAK DIAS BP: 67 MMHG
STRESS PEAK SYS BP: 190 MMHG
STRESS PERCENT HR ACHIEVED: 97 %
STRESS POST PEAK HR: 139 BPM
STRESS RATE PRESSURE PRODUCT: NORMAL BPM*MMHG
STRESS TARGET HR: 144 BPM
TID: 1.14

## 2024-08-12 PROCEDURE — 93018 CV STRESS TEST I&R ONLY: CPT | Performed by: INTERNAL MEDICINE

## 2024-08-12 PROCEDURE — A9502 TC99M TETROFOSMIN: HCPCS | Performed by: INTERNAL MEDICINE

## 2024-08-12 PROCEDURE — 78452 HT MUSCLE IMAGE SPECT MULT: CPT

## 2024-08-12 PROCEDURE — 93016 CV STRESS TEST SUPVJ ONLY: CPT | Performed by: INTERNAL MEDICINE

## 2024-08-12 PROCEDURE — 3430000000 HC RX DIAGNOSTIC RADIOPHARMACEUTICAL: Performed by: INTERNAL MEDICINE

## 2024-08-12 PROCEDURE — 78452 HT MUSCLE IMAGE SPECT MULT: CPT | Performed by: INTERNAL MEDICINE

## 2024-08-12 PROCEDURE — 93017 CV STRESS TEST TRACING ONLY: CPT

## 2024-08-12 RX ADMIN — TETROFOSMIN 11.2 MILLICURIE: 1.38 INJECTION, POWDER, LYOPHILIZED, FOR SOLUTION INTRAVENOUS at 07:33

## 2024-08-12 RX ADMIN — TETROFOSMIN 32.4 MILLICURIE: 1.38 INJECTION, POWDER, LYOPHILIZED, FOR SOLUTION INTRAVENOUS at 08:40

## 2024-08-19 ENCOUNTER — TELEPHONE (OUTPATIENT)
Dept: CARDIOLOGY CLINIC | Age: 74
End: 2024-08-19

## 2024-08-19 NOTE — TELEPHONE ENCOUNTER
Rubina from  called to inform the office that she has not re: the written clearance w/LES signature.  Please fax to:    [phone].  Please advise.  Thank you

## 2024-08-19 NOTE — TELEPHONE ENCOUNTER
CARDIAC CLEARANCE     What type of procedure are you having?  Hybrid Incisional Hernia Repair w/mesh     Which physician is performing your procedure?  Dr. Esteban Cassidy III    When is your procedure scheduled for?  08/22    Where are you having this procedure?  [facility]     Are you taking Blood Thinners?  Yes   If so what? (Name/dose/frequesncy)  Aspirin 81mg,      Does the surgeon want you to stop your blood thinner?  If so for how long?  Yes -  Needs LES opinion    Phone Number and Contact Name for Physicians office:  Rubina  [phone]    Fax number to send information:  [phone]

## 2025-01-03 NOTE — PROGRESS NOTES
colon, diagnostic; Upper gastrointestinal endoscopy (N/A, 03/02/2022); hernia repair (Right, 04/06/2022); Colonoscopy (N/A, 08/03/2022); Upper gastrointestinal endoscopy (N/A, 01/04/2023); and Liver transplant (06/08/2023).     Social History:   reports that he has never smoked. He has never been exposed to tobacco smoke. He has never used smokeless tobacco. He reports that he does not currently use alcohol. He reports that he does not use drugs.     Family History:  family history includes Diabetes in his brother and father; Heart Disease in his father; Kidney Disease in his mother; No Known Problems in his brother.     Home Medications:  Prior to Admission medications    Medication Sig Start Date End Date Taking? Authorizing Provider   carvedilol (COREG) 3.125 MG tablet  1/8/25  Yes Luis Miguel Dias MD   glipiZIDE (GLUCOTROL XL) 5 MG extended release tablet Take 1 tablet by mouth daily 7/8/24  Yes Luis Miguel Dias MD   mycophenolate (CELLCEPT) 250 MG capsule Take 1 capsule by mouth 2 times daily 8/9/23  Yes Luis Miguel Dias MD   tacrolimus (PROGRAF) 1 MG capsule Take 2 capsules by mouth 2 times daily 6/15/23  Yes Luis Miguel Dias MD   levothyroxine (SYNTHROID) 50 MCG tablet Take 40 mcg by mouth Daily   Yes Luis Miguel Dias MD   vitamin B-12 (CYANOCOBALAMIN) 1000 MCG tablet Take 1 tablet by mouth daily   Yes Luis Miguel Dias MD   aspirin 81 MG tablet Take 1 tablet by mouth daily 4/22/19  Yes Finn Morin MD   atorvastatin (LIPITOR) 40 MG tablet Take 1 tablet by mouth nightly 1/30/19  Yes Kathryn Gamble, PATRICK - CNP   ferrous sulfate 325 (65 Fe) MG tablet Take 1 tablet by mouth in the morning and 1 tablet in the evening.   Yes Luis Miguel Dias MD   Multiple Vitamins-Minerals (MULTIVITAMIN PO) Take 1 tablet by mouth daily   Yes Luis Miguel Dias MD   glucose blood VI test strips (FREESTYLE LITE) strip Test blood sugar once daily. 10/6/14  Yes Meme Thomson MD

## 2025-01-29 ENCOUNTER — OFFICE VISIT (OUTPATIENT)
Dept: CARDIOLOGY CLINIC | Age: 75
End: 2025-01-29

## 2025-01-29 VITALS
OXYGEN SATURATION: 97 % | DIASTOLIC BLOOD PRESSURE: 72 MMHG | HEART RATE: 88 BPM | SYSTOLIC BLOOD PRESSURE: 142 MMHG | BODY MASS INDEX: 29.88 KG/M2 | HEIGHT: 74 IN | WEIGHT: 232.8 LBS

## 2025-01-29 DIAGNOSIS — E78.2 MIXED HYPERLIPIDEMIA: ICD-10-CM

## 2025-01-29 DIAGNOSIS — I25.10 CORONARY ARTERY DISEASE INVOLVING NATIVE CORONARY ARTERY OF NATIVE HEART WITHOUT ANGINA PECTORIS: Primary | ICD-10-CM

## 2025-01-29 DIAGNOSIS — Z98.61 S/P CORONARY ANGIOPLASTY: ICD-10-CM

## 2025-01-29 DIAGNOSIS — I10 HYPERTENSION, UNSPECIFIED TYPE: ICD-10-CM

## 2025-01-29 DIAGNOSIS — R55 SYNCOPE AND COLLAPSE: ICD-10-CM

## 2025-01-29 RX ORDER — CARVEDILOL 3.12 MG/1
TABLET ORAL
COMMUNITY
Start: 2025-01-08 | End: 2025-01-29 | Stop reason: SDUPTHER

## 2025-01-29 RX ORDER — CARVEDILOL 6.25 MG/1
6.25 TABLET ORAL 2 TIMES DAILY
Qty: 180 TABLET | Refills: 3 | Status: SHIPPED | OUTPATIENT
Start: 2025-01-29

## 2025-01-29 NOTE — PATIENT INSTRUCTIONS
Coreg 6.25 mg twice daily  Call Dr Morin if bp is too high or too low   Continue risk factor modifications.   Call for any change in symptoms, call to report any changes in shortness of breath or development of chest pain with activity.    Follow up in 6 mos

## 2025-07-08 NOTE — PROGRESS NOTES
Upper Arm, Patient Position: Sitting, BP Cuff Size: Medium Adult)   Pulse 86   Ht 1.88 m (6' 2\")   Wt 107.9 kg (237 lb 14.4 oz)   SpO2 100%   BMI 30.54 kg/m²   Stable    Remain on Coreg 6.25 twice daily     3. Mixed hyperlipidemia    8/2021> , , HDL 32, LDL 62  AST 37 ALT 37  2/12/22>  TG 93 HDL 37 LDL 54.  8/27/22>  TG 77 HDL 28 LDL 60  2/24/23> , TG 89, HDL 28, LDL 58  8/28/23    HDL 51  LDL 65   4/2/24  TG 91  HDL 45 LDL 79   12/7/24     HDL 43  LDL 87  Remain on Lipitor 40 mg.     4. DM  A1C 7.2 (8/2021)  A1C 5.4 (2/2022)  A1C 5.1 (8/2022)  A1c 6.8 (2/2023)  A1c 4.7 (8/28/23)  A1c 5.6 (4/2/24)  A1c 5.3 (12/7/24)  Managed by PCP    He is s/p full cadaver liver transplant 6/8/23 thru [facility] (history of idiopathic liver cirrhosis with recurrent ascites & subsequent routine paracentesis).    5. Syncope and Collapse  - Hospitalization in 3/2024  - EKG (3/28/24) - personally reviewed by me > Stable  - 30 day cardiac event monitor 3/28/24-4/26/24 > NSR, no symptoms  - Echo 4/10/24 > EF 55-60%      Plan:    7/29/25  Cardiac test and lab results personally reviewed by me during this office visit and discussed.     No medication changes  Continue risk factor modifications.   Call for any change in symptoms, call to report any changes in shortness of breath or development of chest pain with activity.    Follow up in 6 mos      I appreciate the opportunity of cooperating in the care of this individual.      Finn Morin M.D., [facility]    Patient's problem list, medications, allergies, past medical, surgical, social and family histories were reviewed and updated as appropriate.    Scribe's attestation: This note was scribed in the presence of Dr Morin by Óscar Marti RN.    The scribe's documentation has been prepared under my direction and personally reviewed by me in its entirety. I confirm that the note above accurately reflects all work, treatment, procedures,

## 2025-07-29 ENCOUNTER — OFFICE VISIT (OUTPATIENT)
Dept: CARDIOLOGY CLINIC | Age: 75
End: 2025-07-29
Payer: MEDICARE

## 2025-07-29 VITALS
HEART RATE: 86 BPM | DIASTOLIC BLOOD PRESSURE: 76 MMHG | OXYGEN SATURATION: 100 % | WEIGHT: 237.9 LBS | BODY MASS INDEX: 30.53 KG/M2 | HEIGHT: 74 IN | SYSTOLIC BLOOD PRESSURE: 136 MMHG

## 2025-07-29 DIAGNOSIS — I25.10 CORONARY ARTERY DISEASE INVOLVING NATIVE CORONARY ARTERY OF NATIVE HEART WITHOUT ANGINA PECTORIS: Primary | ICD-10-CM

## 2025-07-29 DIAGNOSIS — E78.2 MIXED HYPERLIPIDEMIA: ICD-10-CM

## 2025-07-29 DIAGNOSIS — Z98.61 S/P CORONARY ANGIOPLASTY: ICD-10-CM

## 2025-07-29 DIAGNOSIS — R55 SYNCOPE AND COLLAPSE: ICD-10-CM

## 2025-07-29 DIAGNOSIS — I10 HYPERTENSION, UNSPECIFIED TYPE: ICD-10-CM

## 2025-07-29 PROCEDURE — 1036F TOBACCO NON-USER: CPT | Performed by: INTERNAL MEDICINE

## 2025-07-29 PROCEDURE — G8427 DOCREV CUR MEDS BY ELIG CLIN: HCPCS | Performed by: INTERNAL MEDICINE

## 2025-07-29 PROCEDURE — 3075F SYST BP GE 130 - 139MM HG: CPT | Performed by: INTERNAL MEDICINE

## 2025-07-29 PROCEDURE — 1123F ACP DISCUSS/DSCN MKR DOCD: CPT | Performed by: INTERNAL MEDICINE

## 2025-07-29 PROCEDURE — 99214 OFFICE O/P EST MOD 30 MIN: CPT | Performed by: INTERNAL MEDICINE

## 2025-07-29 PROCEDURE — 1159F MED LIST DOCD IN RCRD: CPT | Performed by: INTERNAL MEDICINE

## 2025-07-29 PROCEDURE — 3078F DIAST BP <80 MM HG: CPT | Performed by: INTERNAL MEDICINE

## 2025-07-29 PROCEDURE — G8417 CALC BMI ABV UP PARAM F/U: HCPCS | Performed by: INTERNAL MEDICINE

## (undated) DEVICE — LOOP LIG SUT SZ 0 L18IN ABSRB POLYDIOXANONE MFIL PDS II

## (undated) DEVICE — GOWN SIRUS NONREIN LG W/TWL: Brand: MEDLINE INDUSTRIES, INC.

## (undated) DEVICE — TRAP SPEC RETRV CLR PLAS POLYP IN LN SUCT QUIK CTCH

## (undated) DEVICE — GLOVE SURG SZ 6.5 L11.2IN FNGR THK9.8MIL STRW LTX POLYMER

## (undated) DEVICE — SUTURE VCRL PLUS SZ 0 54IN TIE VCP608H

## (undated) DEVICE — NEEDLE HYPO 22GA L1.5IN BLK POLYPR HUB S STL REG BVL STR

## (undated) DEVICE — BLANKET WRM W29.9XL79.1IN UP BODY FORC AIR MISTRAL-AIR

## (undated) DEVICE — CHLORAPREP 26ML ORANGE

## (undated) DEVICE — ENDOSCOPY KIT: Brand: MEDLINE INDUSTRIES, INC.

## (undated) DEVICE — INSTRUMENT WARMER: Brand: SCOPE WARMER DISPOSABLE SEAL

## (undated) DEVICE — MAJOR SET UP PK

## (undated) DEVICE — SUTURE VCRL + SZ 3-0 L27IN ABSRB UD L26MM SH 1/2 CIR VCP416H

## (undated) DEVICE — TROCAR: Brand: KII FIOS FIRST ENTRY

## (undated) DEVICE — STAPLER INT DIA5MM 25 ABSRB STRP FIX DISP FOR HERN MESH

## (undated) DEVICE — SOLUTION IV IRRIG WATER 500ML POUR BRL ST 2F7113

## (undated) DEVICE — SYRINGE MED 10ML TRNSLUC BRL PLUNG BLK MRK POLYPR CTRL

## (undated) DEVICE — SOLUTION IRRIG 1000ML 0.9% SOD CHL USP POUR PLAS BTL

## (undated) DEVICE — TOWEL,OR,DSP,ST,BLUE,STD,4/PK,20PK/CS: Brand: MEDLINE

## (undated) DEVICE — COVER LT HNDL BLU PLAS

## (undated) DEVICE — SUTURE ETHBND EXCEL SZ 0 L18IN NONABSORBABLE GRN L36MM CT-1 CX21D

## (undated) DEVICE — GAUZE,SPONGE,4"X4",8PLY,STRL,LF,10/TRAY: Brand: MEDLINE

## (undated) DEVICE — CORD ES L10FT MPLR LAP

## (undated) DEVICE — DRAPE,LAP,CHOLE,W/TROUGHS,STERILE: Brand: MEDLINE

## (undated) DEVICE — MEDICINE CUP, GRADUATED, STER: Brand: MEDLINE

## (undated) DEVICE — MERCY FAIRFIELD TURNOVER KIT: Brand: MEDLINE INDUSTRIES, INC.

## (undated) DEVICE — SYRINGE, LUER LOCK, 10ML: Brand: MEDLINE

## (undated) DEVICE — SHEET, T, LAPAROTOMY, STERILE: Brand: MEDLINE

## (undated) DEVICE — GAUZE,SPONGE,4"X4",16PLY,XRAY,STRL,LF: Brand: MEDLINE

## (undated) DEVICE — [HIGH FLOW INSUFFLATOR,  DO NOT USE IF PACKAGE IS DAMAGED,  KEEP DRY,  KEEP AWAY FROM SUNLIGHT,  PROTECT FROM HEAT AND RADIOACTIVE SOURCES.]: Brand: PNEUMOSURE

## (undated) DEVICE — BITE BLOCK ENDOSCP AD 60 FR W/ ADJ STRP PLAS GRN BLOX

## (undated) DEVICE — BITE BLK 60FR GRN ENDOSCP AD W STRP SLD DISPOSABLE

## (undated) DEVICE — SUTURE MCRYL + SZ 4-0 L27IN ABSRB UD L19MM PS-2 3/8 CIR MCP426H

## (undated) DEVICE — DRAPE,CHEST,FENES,15X10,STERIL: Brand: MEDLINE

## (undated) DEVICE — LAPAROSCOPY PACK: Brand: MEDLINE INDUSTRIES, INC.

## (undated) DEVICE — BLADE ES ELASTOMERIC COAT INSUL DURABLE BEND UPTO 90DEG

## (undated) DEVICE — 3M™ IOBAN™ 2 ANTIMICROBIAL INCISE DRAPE 6650EZ: Brand: IOBAN™ 2

## (undated) DEVICE — SUTURE VCRL + SZ 0 L18IN ABSRB CLR VCP112G

## (undated) DEVICE — BLADE CLIPPER GEN PURP NS

## (undated) DEVICE — SUTURE ETHBND EXCEL SZ 0 L18IN NONABSORBABLE GRN L26MM MO-6 CX45D

## (undated) DEVICE — FORCEPS BX L240CM WRK CHN 2.8MM STD CAP W/ NDL MIC MESH

## (undated) DEVICE — HYPODERMIC SAFETY NEEDLE: Brand: MAGELLAN

## (undated) DEVICE — TOTAL TRAY, DB, 100% SILI FOLEY, 16FR 10: Brand: MEDLINE

## (undated) DEVICE — PENCIL ES ULT VAC W TELSCP NOSE EZ CLN BLDE 10FT

## (undated) DEVICE — APPLICATOR MEDICATED 26 CC SOLUTION HI LT ORNG CHLORAPREP

## (undated) DEVICE — Device: Brand: DISPOSABLE ELECTROSURGICAL SNARE

## (undated) DEVICE — PROCEDURE KIT ENDOSCP CUST

## (undated) DEVICE — ADHESIVE SKIN CLSR 0.7ML TOP DERMBND ADV

## (undated) DEVICE — PMI DISPOSABLE PUNCTURE CLOSURE DEVICE / SUTURE GRASPER: Brand: PMI

## (undated) DEVICE — SUTURE VCRL + SZ 2-0 L36IN ABSRB UD L36MM CT-1 1/2 CIR VCP945H

## (undated) DEVICE — SIX SHOOTER SAEED MULTI-BAND LIGATOR: Brand: SAEED

## (undated) DEVICE — SOLUTION IV IRRIG 500ML 0.9% SODIUM CHL 2F7123

## (undated) DEVICE — CONTAINER SPEC 480ML CLR POLYSTYR 10% NEUT BUFF FRMLN ZN

## (undated) DEVICE — Z DISCONTINUED USE 2272117 DRAPE SURG 3 QTR N INVASIVE 2 LAYR DISP

## (undated) DEVICE — BW-412T DISP COMBO CLEANING BRUSH: Brand: SINGLE USE COMBINATION CLEANING BRUSH

## (undated) DEVICE — ENDO CARRY-ON PROCEDURE KIT: Brand: ENDO CARRY-ON PROCEDURE KIT

## (undated) DEVICE — KIT OR ROOM TURNOVER W/STRAP

## (undated) DEVICE — MOUTHPIECE ENDOSCP L CTRL OPN AND SIDE PORTS DISP

## (undated) DEVICE — SUTURE VCRL SZ 3-0 L18IN ABSRB UD L26MM SH 1/2 CIR J864D

## (undated) DEVICE — FORCEPS BX 240CM 2.4MM L NDL RAD JAW 4 M00513334

## (undated) DEVICE — LAPAROSCOPIC TROCAR SLEEVE/SINGLE USE: Brand: KII® LOW PROFILE OPTICAL ACCESS SYSTEM

## (undated) DEVICE — SHEET,DRAPE,53X77,STERILE: Brand: MEDLINE

## (undated) DEVICE — INTENDED FOR TISSUE SEPARATION, AND OTHER PROCEDURES THAT REQUIRE A SHARP SURGICAL BLADE TO PUNCTURE OR CUT.: Brand: BARD-PARKER ® STAINLESS STEEL BLADES

## (undated) DEVICE — SUTURE VCRL + SZ 0 L27IN ABSRB VLT L26MM UR-6 5/8 CIR VCP603H

## (undated) DEVICE — SUTURE VCRL + SZ 3-0 L18IN ABSRB UD SH 1/2 CIR TAPERCUT NDL VCP864D

## (undated) DEVICE — TROCAR SLEEVE: Brand: KII ® LOW PROFILE SLEEVE

## (undated) DEVICE — DRAPE,REIN 53X77,STERILE: Brand: MEDLINE

## (undated) DEVICE — SUTURE MCRYL SZ 4-0 L27IN ABSRB UD L19MM PS-2 1/2 CIR PRIM Y426H

## (undated) DEVICE — 30977 SEE SHARP - ENHANCED INTRAOPERATIVE LAPAROSCOPE CLEANING & DEFOGGING: Brand: 30977 SEE SHARP - ENHANCED INTRAOPERATIVE LAPAROSCOPE CLEANING & DEFOGGING

## (undated) DEVICE — KIT BAL DISECT KII LO PROF OVL 5MMX55MM